# Patient Record
Sex: MALE | Race: WHITE | NOT HISPANIC OR LATINO | Employment: FULL TIME | URBAN - METROPOLITAN AREA
[De-identification: names, ages, dates, MRNs, and addresses within clinical notes are randomized per-mention and may not be internally consistent; named-entity substitution may affect disease eponyms.]

---

## 2018-01-18 ENCOUNTER — RECORDS - HEALTHEAST (OUTPATIENT)
Dept: LAB | Facility: CLINIC | Age: 58
End: 2018-01-18

## 2018-01-19 LAB
BORD PER/PARAPER PCR: NEGATIVE
BORD PER/PARAPER SOURCE: NORMAL

## 2019-12-10 ENCOUNTER — RECORDS - HEALTHEAST (OUTPATIENT)
Dept: LAB | Facility: CLINIC | Age: 59
End: 2019-12-10

## 2019-12-10 LAB
CHOLEST SERPL-MCNC: 167 MG/DL
FASTING STATUS PATIENT QL REPORTED: YES
HDLC SERPL-MCNC: 49 MG/DL
LDLC SERPL CALC-MCNC: 105 MG/DL
PSA SERPL-MCNC: 1.6 NG/ML (ref 0–3.5)
TRIGL SERPL-MCNC: 67 MG/DL

## 2021-08-15 ENCOUNTER — HEALTH MAINTENANCE LETTER (OUTPATIENT)
Age: 61
End: 2021-08-15

## 2021-10-11 ENCOUNTER — HEALTH MAINTENANCE LETTER (OUTPATIENT)
Age: 61
End: 2021-10-11

## 2022-09-25 ENCOUNTER — HEALTH MAINTENANCE LETTER (OUTPATIENT)
Age: 62
End: 2022-09-25

## 2022-12-05 ENCOUNTER — TRANSFERRED RECORDS (OUTPATIENT)
Dept: HEALTH INFORMATION MANAGEMENT | Facility: CLINIC | Age: 62
End: 2022-12-05

## 2023-03-21 ENCOUNTER — OFFICE VISIT (OUTPATIENT)
Dept: CARDIOLOGY | Facility: CLINIC | Age: 63
End: 2023-03-21
Payer: COMMERCIAL

## 2023-03-21 VITALS
RESPIRATION RATE: 16 BRPM | SYSTOLIC BLOOD PRESSURE: 134 MMHG | WEIGHT: 200.6 LBS | DIASTOLIC BLOOD PRESSURE: 72 MMHG | HEIGHT: 70 IN | HEART RATE: 72 BPM | BODY MASS INDEX: 28.72 KG/M2

## 2023-03-21 DIAGNOSIS — I25.10 CORONARY ARTERY CALCIFICATION: ICD-10-CM

## 2023-03-21 DIAGNOSIS — R01.1 SYSTOLIC MURMUR AT CARDIAC APEX: Primary | ICD-10-CM

## 2023-03-21 DIAGNOSIS — G47.33 OBSTRUCTIVE SLEEP APNEA SYNDROME: ICD-10-CM

## 2023-03-21 PROCEDURE — 99204 OFFICE O/P NEW MOD 45 MIN: CPT | Performed by: INTERNAL MEDICINE

## 2023-03-21 RX ORDER — RIZATRIPTAN BENZOATE 10 MG/1
TABLET ORAL
COMMUNITY
Start: 2022-12-05

## 2023-03-21 RX ORDER — ALBUTEROL SULFATE 90 UG/1
1 AEROSOL, METERED RESPIRATORY (INHALATION) PRN
COMMUNITY
Start: 2022-07-31

## 2023-03-21 RX ORDER — LORATADINE 10 MG/1
TABLET ORAL
COMMUNITY

## 2023-03-21 RX ORDER — BENZALKONIUM CHLORIDE 1.3 MG/ML
CLOTH TOPICAL
COMMUNITY

## 2023-03-21 RX ORDER — MULTIVITAMIN,THERAPEUTIC
1 TABLET ORAL DAILY
COMMUNITY

## 2023-03-21 RX ORDER — EPINEPHRINE 0.3 MG/.3ML
0.3 INJECTION SUBCUTANEOUS PRN
COMMUNITY

## 2023-03-21 RX ORDER — CHLORAL HYDRATE 500 MG
2000 CAPSULE ORAL DAILY
COMMUNITY

## 2023-03-21 NOTE — PROGRESS NOTES
[facility]  [phone]        Assessment/Recommendations   Patient with recently discovered heart murmur which is at least 3/6 systolic and heard best at the apex radiating slightly toward the left axilla.  I suspect this is significant mitral insufficiency but there is also some radiation up toward the carotids and I cannot exclude aortic valve issues.  The murmur is significant and an echocardiogram is advised to establish any valvular heart disease.  We will get that in the near future.    He does not have any exercise intolerance with the exception of maybe a little more shortness of breath with physical activity but he admits to being more sedentary and is starting to get more active after his hip surgery which was done in January.  He would like to start exercising.  I advised him to walk briskly but avoid more vigorous exercise until the echocardiogram is performed.  We will call him with the results.    He does have coronary calcifications and we may need and want to do stress testing and be more aggressive with his lipid panel although his LDL cholesterol is quite reasonable at 113 1 month ago.    May want to screen his abdominal aorta in the future as well.    We will get back to him with results of the echocardiogram and further recommendations.       History of Present Illness/Subjective    Mr. Guille Owusu is a 63 year old male with no known heart disease.  He does have a family history where his father  of abdominal aortic aneurysm and had quite significant hypertension for many years.  Mother has hypertension.    The patient has had more medical visits of late and in December had a physical exam without a description of a heart murmur.  On a routine follow-up exam at the end of January he was noted to have a heart murmur.  He had never been told he had a heart murmur before that.  He has no history rheumatic fever.  He has reasonable exercise tolerance but admits to  "not being very active in part because of hip problems and recent hip surgery.  He would like to start an exercise routine.  He does have obstructive sleep apnea and needed to get a different mask which she does not like very much so now only treats himself about 3 hours per night.  He denies orthopnea, paroxysmal nocturnal dyspnea, peripheral edema, and palpitations.  No description of chest discomfort.    He has not had syncopal or near syncopal episodes.    Patient is  and his spouse is with him at the time of our evaluation.  He has 2 children.  He continues to work in the [employer] business and is contemplating [employer] within 1 year.    He has no history of cerebrovascular accident or TIA.  His LDL cholesterol is 113, he does not smoke cigarettes, is not treated for diabetes but does have a family history of hypertension and abdominal aortic aneurysm.    ECG: Personally reviewed.  EKG from his clinic is normal.       Physical Examination Review of Systems   /72 (BP Location: Right arm, Patient Position: Sitting, Cuff Size: Adult Regular)   Pulse 72   Resp 16   Ht 1.778 m (5' 10\")   Wt 91 kg (200 lb 9.6 oz)   BMI 28.78 kg/m    Body mass index is 28.78 kg/m .  Wt Readings from Last 3 Encounters:   03/21/23 91 kg (200 lb 9.6 oz)     General Appearance:   Alert, cooperative and in no acute distress.   ENT/Mouth: Patient wearing a mask.      EYES:  no scleral icterus, normal conjunctivae   Neck: JVP normal. No Hepatojugular reflux. Thyroid not visualized.   Chest/Lungs:   Lungs are clear to auscultation, equal chest wall expansion.   Cardiovascular:   S1, S2 with 3/6 systolic murmur heard best at the apex and radiates toward the left axilla with some radiation up toward the aortic area and into the right carotid.,clicks or rubs. Brachial, radial and posterior tibial pulses are intact and symetric.  Carotid pulses are easily palpable and symmetric.   Abdomen:  Nontender. BS+.  " "  Extremities: No cyanosis, clubbing or edema   Skin: no xanthelasma, warm.    Neurologic: normal arm movement bilateral, no tremors     Psychiatric: Appropriate affect.      Enc Vitals  BP: 134/72  Pulse: 72  Resp: 16  Weight: 91 kg (200 lb 9.6 oz)  Height: 177.8 cm (5' 10\")                                           Medical History  Surgical History Family History Social History   No past medical history on file. No past surgical history on file. No family history on file. Social History     Socioeconomic History     Marital status:      Spouse name: Not on file     Number of children: Not on file     Years of education: Not on file     Highest education level: Not on file   Occupational History     Not on file   Tobacco Use     Smoking status: Never     Smokeless tobacco: Never   Vaping Use     Vaping Use: Never used   Substance and Sexual Activity     Alcohol use: Not on file     Drug use: Not on file     Sexual activity: Not on file   Other Topics Concern     Not on file   Social History Narrative     Not on file     Social Determinants of Health     Financial Resource Strain: Not on file   Food Insecurity: Not on file   Transportation Needs: Not on file   Physical Activity: Not on file   Stress: Not on file   Social Connections: Not on file   Intimate Partner Violence: Not on file   Housing Stability: Not on file          Medications  Allergies   Current Outpatient Medications   Medication Sig Dispense Refill     albuterol (PROAIR HFA/PROVENTIL HFA/VENTOLIN HFA) 108 (90 Base) MCG/ACT inhaler Inhale 1 puff into the lungs as needed       EPINEPHrine (ANY BX GENERIC EQUIV) 0.3 MG/0.3ML injection 2-pack 0.3 mg intramuscularly 1X as directed, 2 of the 2 pack       fish oil-omega-3 fatty acids 1000 MG capsule Take 2,000 mg by mouth daily       loratadine (CLARITIN) 10 MG tablet 1 tab(s) orally once a day       multivitamin, therapeutic (THERA-VIT) TABS tablet Take 1 tablet by mouth daily       Respiratory " Therapy Supplies (CARETOUCH CPAP & BIPAP HOSE) MISC (CUSTOM Rx) C-PAP MACHINE       rizatriptan (MAXALT) 10 MG tablet 1 tab(s) orally may repeat once in 24 hr, no sooner than 2 hr after the first for migraine, 3 month supply      Allergies   Allergen Reactions     Other Food Allergy Anaphylaxis     Fruits, cherries, apples, banana - almost everything except for melons and citrus     Horse-Derived Products Other (See Comments)     Other reaction(s): facial swelling  Facial swelling           Lab Results    Chemistry/lipid CBC Cardiac Enzymes/BNP/TSH/INR   Lab Results   Component Value Date    CHOL 167 12/10/2019    HDL 49 12/10/2019    TRIG 67 12/10/2019    No results found for: WBC, HGB, HCT, MCV, PLT No results found for: CKTOTAL, CKMB, TROPONINI, BNP, TSH, INR

## 2023-03-21 NOTE — LETTER
3/21/2023    RADHA BENDER  [facility] [address]  [facility] [place] [facility] [address]    RE: Guille Owusu       Dear Colleague,     I had the pleasure of seeing Guille Owusu in the [facility].      [facility]  [phone]        Assessment/Recommendations   Patient with recently discovered heart murmur which is at least 3/6 systolic and heard best at the apex radiating slightly toward the left axilla.  I suspect this is significant mitral insufficiency but there is also some radiation up toward the carotids and I cannot exclude aortic valve issues.  The murmur is significant and an echocardiogram is advised to establish any valvular heart disease.  We will get that in the near future.    He does not have any exercise intolerance with the exception of maybe a little more shortness of breath with physical activity but he admits to being more sedentary and is starting to get more active after his hip surgery which was done in January.  He would like to start exercising.  I advised him to walk briskly but avoid more vigorous exercise until the echocardiogram is performed.  We will call him with the results.    He does have coronary calcifications and we may need and want to do stress testing and be more aggressive with his lipid panel although his LDL cholesterol is quite reasonable at 113 1 month ago.    May want to screen his abdominal aorta in the future as well.    We will get back to him with results of the echocardiogram and further recommendations.       History of Present Illness/Subjective    Mr. Guille Owusu is a 63 year old male with no known heart disease.  He does have a family history where his father  of abdominal aortic aneurysm and had quite significant hypertension for many years.  Mother has hypertension.    The patient has had more medical visits of late and in December had a physical exam without a description of a heart murmur.  On a routine  "follow-up exam at the end of January he was noted to have a heart murmur.  He had never been told he had a heart murmur before that.  He has no history rheumatic fever.  He has reasonable exercise tolerance but admits to not being very active in part because of hip problems and recent hip surgery.  He would like to start an exercise routine.  He does have obstructive sleep apnea and needed to get a different mask which she does not like very much so now only treats himself about 3 hours per night.  He denies orthopnea, paroxysmal nocturnal dyspnea, peripheral edema, and palpitations.  No description of chest discomfort.    He has not had syncopal or near syncopal episodes.    Patient is  and his spouse is with him at the time of our evaluation.  He has 2 children.  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    He has no history of cerebrovascular accident or TIA.  His LDL cholesterol is 113, he does not smoke cigarettes, is not treated for diabetes but does have a family history of hypertension and abdominal aortic aneurysm.    ECG: Personally reviewed.  EKG from his clinic is normal.       Physical Examination Review of Systems   /72 (BP Location: Right arm, Patient Position: Sitting, Cuff Size: Adult Regular)   Pulse 72   Resp 16   Ht 1.778 m (5' 10\")   Wt 91 kg (200 lb 9.6 oz)   BMI 28.78 kg/m    Body mass index is 28.78 kg/m .  Wt Readings from Last 3 Encounters:   03/21/23 91 kg (200 lb 9.6 oz)     General Appearance:   Alert, cooperative and in no acute distress.   ENT/Mouth: Patient wearing a mask.      EYES:  no scleral icterus, normal conjunctivae   Neck: JVP normal. No Hepatojugular reflux. Thyroid not visualized.   Chest/Lungs:   Lungs are clear to auscultation, equal chest wall expansion.   Cardiovascular:   S1, S2 with 3/6 systolic murmur heard best at the apex and radiates toward the left axilla with some radiation up " "toward the aortic area and into the right carotid.,clicks or rubs. Brachial, radial and posterior tibial pulses are intact and symetric.  Carotid pulses are easily palpable and symmetric.   Abdomen:  Nontender. BS+.    Extremities: No cyanosis, clubbing or edema   Skin: no xanthelasma, warm.    Neurologic: normal arm movement bilateral, no tremors     Psychiatric: Appropriate affect.      Enc Vitals  BP: 134/72  Pulse: 72  Resp: 16  Weight: 91 kg (200 lb 9.6 oz)  Height: 177.8 cm (5' 10\")                                           Medical History  Surgical History Family History Social History   No past medical history on file. No past surgical history on file. No family history on file. Social History     Socioeconomic History     Marital status:      Spouse name: Not on file     Number of children: Not on file     Years of education: Not on file     Highest education level: Not on file   Occupational History     Not on file   Tobacco Use     Smoking status: Never     Smokeless tobacco: Never   Vaping Use     Vaping Use: Never used   Substance and Sexual Activity     Alcohol use: Not on file     Drug use: Not on file     Sexual activity: Not on file   Other Topics Concern     Not on file   Social History Narrative     Not on file     Social Determinants of Health     Financial Resource Strain: Not on file   Food Insecurity: Not on file   Transportation Needs: Not on file   Physical Activity: Not on file   Stress: Not on file   Social Connections: Not on file   Intimate Partner Violence: Not on file   Housing Stability: Not on file          Medications  Allergies   Current Outpatient Medications   Medication Sig Dispense Refill     albuterol (PROAIR HFA/PROVENTIL HFA/VENTOLIN HFA) 108 (90 Base) MCG/ACT inhaler Inhale 1 puff into the lungs as needed       EPINEPHrine (ANY BX GENERIC EQUIV) 0.3 MG/0.3ML injection 2-pack 0.3 mg intramuscularly 1X as directed, 2 of the 2 pack       fish oil-omega-3 fatty acids " 1000 MG capsule Take 2,000 mg by mouth daily       loratadine (CLARITIN) 10 MG tablet 1 tab(s) orally once a day       multivitamin, therapeutic (THERA-VIT) TABS tablet Take 1 tablet by mouth daily       Respiratory Therapy Supplies (CARETOUCH CPAP & BIPAP HOSE) MISC (CUSTOM Rx) C-PAP MACHINE       rizatriptan (MAXALT) 10 MG tablet 1 tab(s) orally may repeat once in 24 hr, no sooner than 2 hr after the first for migraine, 3 month supply      Allergies   Allergen Reactions     Other Food Allergy Anaphylaxis     Fruits, cherries, apples, banana - almost everything except for melons and citrus     Horse-Derived Products Other (See Comments)     Other reaction(s): facial swelling  Facial swelling           Lab Results    Chemistry/lipid CBC Cardiac Enzymes/BNP/TSH/INR   Lab Results   Component Value Date    CHOL 167 12/10/2019    HDL 49 12/10/2019    TRIG 67 12/10/2019    No results found for: WBC, HGB, HCT, MCV, PLT No results found for: CKTOTAL, CKMB, TROPONINI, BNP, TSH, INR             Thank you for allowing me to participate in the care of your patient.      Sincerely,     Armando Ram MD     [facility]  cc:   No referring provider defined for this encounter.

## 2023-03-23 ENCOUNTER — HOSPITAL ENCOUNTER (OUTPATIENT)
Dept: CARDIOLOGY | Facility: CLINIC | Age: 63
Discharge: HOME | End: 2023-03-23
Attending: INTERNAL MEDICINE | Admitting: INTERNAL MEDICINE
Payer: COMMERCIAL

## 2023-03-23 DIAGNOSIS — R01.1 SYSTOLIC MURMUR AT CARDIAC APEX: ICD-10-CM

## 2023-03-23 LAB — LVEF ECHO: NORMAL

## 2023-03-23 PROCEDURE — 93306 TTE W/DOPPLER COMPLETE: CPT | Mod: 26 | Performed by: INTERNAL MEDICINE

## 2023-03-23 PROCEDURE — 93306 TTE W/DOPPLER COMPLETE: CPT

## 2023-03-28 DIAGNOSIS — R01.1 SYSTOLIC MURMUR AT CARDIAC APEX: Primary | ICD-10-CM

## 2023-03-30 ENCOUNTER — HOSPITAL ENCOUNTER (OUTPATIENT)
Dept: CARDIOLOGY | Facility: HOSPITAL | Age: 63
Discharge: HOME | End: 2023-03-30
Attending: INTERNAL MEDICINE | Admitting: INTERNAL MEDICINE
Payer: COMMERCIAL

## 2023-03-30 VITALS
WEIGHT: 194 LBS | RESPIRATION RATE: 18 BRPM | DIASTOLIC BLOOD PRESSURE: 70 MMHG | TEMPERATURE: 97.9 F | SYSTOLIC BLOOD PRESSURE: 113 MMHG | HEIGHT: 70 IN | HEART RATE: 59 BPM | OXYGEN SATURATION: 96 % | BODY MASS INDEX: 27.77 KG/M2

## 2023-03-30 DIAGNOSIS — R01.1 SYSTOLIC MURMUR AT CARDIAC APEX: ICD-10-CM

## 2023-03-30 LAB — LVEF ECHO: NORMAL

## 2023-03-30 PROCEDURE — 93325 DOPPLER ECHO COLOR FLOW MAPG: CPT | Mod: 26 | Performed by: INTERNAL MEDICINE

## 2023-03-30 PROCEDURE — 258N000003 HC RX IP 258 OP 636: Performed by: INTERNAL MEDICINE

## 2023-03-30 PROCEDURE — 93325 DOPPLER ECHO COLOR FLOW MAPG: CPT

## 2023-03-30 PROCEDURE — 250N000009 HC RX 250: Performed by: INTERNAL MEDICINE

## 2023-03-30 PROCEDURE — 250N000011 HC RX IP 250 OP 636: Performed by: INTERNAL MEDICINE

## 2023-03-30 PROCEDURE — 93320 DOPPLER ECHO COMPLETE: CPT | Mod: 26 | Performed by: INTERNAL MEDICINE

## 2023-03-30 PROCEDURE — 99152 MOD SED SAME PHYS/QHP 5/>YRS: CPT | Performed by: INTERNAL MEDICINE

## 2023-03-30 PROCEDURE — 93312 ECHO TRANSESOPHAGEAL: CPT

## 2023-03-30 PROCEDURE — 93312 ECHO TRANSESOPHAGEAL: CPT | Mod: 26 | Performed by: INTERNAL MEDICINE

## 2023-03-30 PROCEDURE — 99153 MOD SED SAME PHYS/QHP EA: CPT | Performed by: INTERNAL MEDICINE

## 2023-03-30 RX ORDER — IBUPROFEN 200 MG
200-400 TABLET ORAL EVERY 6 HOURS PRN
COMMUNITY

## 2023-03-30 RX ORDER — AMOXICILLIN 500 MG/1
CAPSULE ORAL
COMMUNITY
Start: 2023-02-13 | End: 2023-04-25

## 2023-03-30 RX ORDER — SODIUM CHLORIDE 9 MG/ML
INJECTION, SOLUTION INTRAVENOUS CONTINUOUS
Status: DISCONTINUED | OUTPATIENT
Start: 2023-03-30 | End: 2023-03-30 | Stop reason: HOSPADM

## 2023-03-30 RX ORDER — LIDOCAINE HYDROCHLORIDE 20 MG/ML
SOLUTION OROPHARYNGEAL
Status: COMPLETED | OUTPATIENT
Start: 2023-03-30 | End: 2023-03-30

## 2023-03-30 RX ORDER — FLUTICASONE PROPIONATE 50 MCG
2 SPRAY, SUSPENSION (ML) NASAL
COMMUNITY
Start: 2023-03-28 | End: 2023-04-25

## 2023-03-30 RX ORDER — FENTANYL CITRATE 50 UG/ML
INJECTION, SOLUTION INTRAMUSCULAR; INTRAVENOUS
Status: COMPLETED | OUTPATIENT
Start: 2023-03-30 | End: 2023-03-30

## 2023-03-30 RX ADMIN — MIDAZOLAM 0.5 MG: 1 INJECTION INTRAMUSCULAR; INTRAVENOUS at 13:13

## 2023-03-30 RX ADMIN — BENZOCAINE 2 SPRAY: 220 SPRAY, METERED PERIODONTAL at 12:59

## 2023-03-30 RX ADMIN — SODIUM CHLORIDE: 9 INJECTION, SOLUTION INTRAVENOUS at 12:23

## 2023-03-30 RX ADMIN — LIDOCAINE HYDROCHLORIDE 10 ML: 20 SOLUTION ORAL; TOPICAL at 12:56

## 2023-03-30 RX ADMIN — FENTANYL CITRATE 25 MCG: 50 INJECTION, SOLUTION INTRAMUSCULAR; INTRAVENOUS at 13:02

## 2023-03-30 RX ADMIN — MIDAZOLAM 0.5 MG: 1 INJECTION INTRAMUSCULAR; INTRAVENOUS at 13:23

## 2023-03-30 RX ADMIN — MIDAZOLAM 0.5 MG: 1 INJECTION INTRAMUSCULAR; INTRAVENOUS at 13:16

## 2023-03-30 RX ADMIN — MIDAZOLAM 1 MG: 1 INJECTION INTRAMUSCULAR; INTRAVENOUS at 13:03

## 2023-03-30 RX ADMIN — BENZOCAINE 2 SPRAY: 220 SPRAY, METERED PERIODONTAL at 13:01

## 2023-03-30 RX ADMIN — MIDAZOLAM 1 MG: 1 INJECTION INTRAMUSCULAR; INTRAVENOUS at 13:00

## 2023-03-30 RX ADMIN — FENTANYL CITRATE 75 MCG: 50 INJECTION, SOLUTION INTRAMUSCULAR; INTRAVENOUS at 12:59

## 2023-03-30 RX ADMIN — BENZOCAINE 2 SPRAY: 220 SPRAY, METERED PERIODONTAL at 13:02

## 2023-03-30 RX ADMIN — MIDAZOLAM 0.5 MG: 1 INJECTION INTRAMUSCULAR; INTRAVENOUS at 13:08

## 2023-03-30 ASSESSMENT — ACTIVITIES OF DAILY LIVING (ADL): ADLS_ACUITY_SCORE: 35

## 2023-03-30 NOTE — DISCHARGE INSTRUCTIONS
1. You are required to have someone accompany you home.    2. Rest today. Do not drive or operate machinery today. Over-activity may produce nausea and dizziness.    3. You should follow your normal diet. Drink plenty of fluids. Do not drink any alcoholic beverages for 24 hours. *(Alcohol may interact with the medications you received today).    4. NO HOT FOODS or LIQUIDS FOR 6 HOURS after the procedure.  0800 PM    5. You may have a sore throat or cough. This is normal. These symptoms should resolve in 24 hours.     6. If you have further questions call your doctor:

## 2023-03-30 NOTE — PRE-PROCEDURE
GENERAL PRE-PROCEDURE:   Date/Time:  3/30/2023 12:50 PM    Verbal consent obtained?: Yes    Written consent obtained?: Yes    Risks and benefits: Risks, benefits and alternatives were discussed    Consent given by:  Patient  Patient states understanding of procedure being performed: Yes    Patient's understanding of procedure matches consent: Yes    Procedure consent matches procedure scheduled: Yes    Expected level of sedation:  Moderate  Appropriately NPO:  Yes  Mallampati  :  Grade 2- soft palate, base of uvula, tonsillar pillars, and portion of posterior pharyngeal wall visible  Lungs:  Lungs clear with good breath sounds bilaterally  Heart:  RRR and systolic murmur  History & Physical reviewed:  History and physical reviewed and no updates needed  Statement of review:  I have reviewed the lab findings, diagnostic data, medications, and the plan for sedation

## 2023-04-03 ENCOUNTER — TELEPHONE (OUTPATIENT)
Dept: CARDIOLOGY | Facility: CLINIC | Age: 63
End: 2023-04-03
Payer: COMMERCIAL

## 2023-04-03 NOTE — TELEPHONE ENCOUNTER
----- Message from Samara DOVER MA sent at 4/3/2023  2:19 PM CDT -----    Appt for pt is on 4/18/23 @ 1:30pm at [facility], Spoke with pt regarding appt.    ----- Message -----  From: Aniceto Zeng MD  Sent: 4/3/2023   1:40 PM CDT  To: Erika Barger RN; #    Hi Armando,    Yes, I will definitely see him and discuss the treatment of his MR with him. I reviewed the KARISHMA and it does look repairable.    Thank you,  David  ----- Message -----  From: Armando Ram MD  Sent: 4/1/2023   9:45 AM CDT  To: Erika Barger RN; Rachel Monahan; #    Gaby,    Recently saw Mr. Owusu for new murmur. 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    Could he get in to see Dr. Zeng in the next couple of weeks?    He is also a friend from the past.    Thanks,    Armando

## 2023-04-03 NOTE — TELEPHONE ENCOUNTER
4/3/23 called and spoke with pt regarding his new upcoming 4/18/23 @ 1:30 pm appt with Dr. Zeng. Referral from Dr. Armando Ram to [facility] treatment of MR.  Pt understands and agrees to the plan.

## 2023-04-06 ENCOUNTER — TRANSFERRED RECORDS (OUTPATIENT)
Dept: HEALTH INFORMATION MANAGEMENT | Facility: CLINIC | Age: 63
End: 2023-04-06
Payer: COMMERCIAL

## 2023-04-16 ENCOUNTER — HOSPITAL ENCOUNTER (EMERGENCY)
Facility: HOSPITAL | Age: 63
Discharge: HOME | End: 2023-04-16
Attending: EMERGENCY MEDICINE | Admitting: EMERGENCY MEDICINE
Payer: COMMERCIAL

## 2023-04-16 ENCOUNTER — ANCILLARY PROCEDURE (OUTPATIENT)
Dept: ULTRASOUND IMAGING | Facility: HOSPITAL | Age: 63
End: 2023-04-16
Attending: EMERGENCY MEDICINE
Payer: COMMERCIAL

## 2023-04-16 ENCOUNTER — TELEPHONE (OUTPATIENT)
Dept: CARDIOLOGY | Facility: CLINIC | Age: 63
End: 2023-04-16
Payer: COMMERCIAL

## 2023-04-16 ENCOUNTER — APPOINTMENT (OUTPATIENT)
Dept: RADIOLOGY | Facility: HOSPITAL | Age: 63
End: 2023-04-16
Attending: EMERGENCY MEDICINE
Payer: COMMERCIAL

## 2023-04-16 VITALS
HEART RATE: 56 BPM | SYSTOLIC BLOOD PRESSURE: 109 MMHG | TEMPERATURE: 97.4 F | DIASTOLIC BLOOD PRESSURE: 65 MMHG | OXYGEN SATURATION: 96 % | RESPIRATION RATE: 20 BRPM

## 2023-04-16 DIAGNOSIS — R07.89 CHEST TIGHTNESS: ICD-10-CM

## 2023-04-16 DIAGNOSIS — R06.02 SHORTNESS OF BREATH: ICD-10-CM

## 2023-04-16 DIAGNOSIS — I34.0 MITRAL VALVE INSUFFICIENCY, UNSPECIFIED ETIOLOGY: ICD-10-CM

## 2023-04-16 PROBLEM — G47.33 OSA (OBSTRUCTIVE SLEEP APNEA): Status: ACTIVE | Noted: 2023-01-31

## 2023-04-16 PROBLEM — Z91.018 ALLERGY TO OTHER FOODS: Status: ACTIVE | Noted: 2023-04-16

## 2023-04-16 PROBLEM — G43.909 MIGRAINE: Status: ACTIVE | Noted: 2023-04-16

## 2023-04-16 PROBLEM — F43.29 ADJUSTMENT DISORDER WITH OTHER SYMPTOM: Status: ACTIVE | Noted: 2023-04-16

## 2023-04-16 PROBLEM — J45.990 EXERCISE-INDUCED BRONCHOSPASM: Status: ACTIVE | Noted: 2023-04-16

## 2023-04-16 PROBLEM — R01.1 SYSTOLIC MURMUR: Status: ACTIVE | Noted: 2023-01-31

## 2023-04-16 PROBLEM — J30.9 ALLERGIC RHINITIS: Status: ACTIVE | Noted: 2023-04-16

## 2023-04-16 PROBLEM — T78.04XA: Status: ACTIVE | Noted: 2023-04-16

## 2023-04-16 LAB
ANION GAP SERPL CALCULATED.3IONS-SCNC: 11 MMOL/L (ref 7–15)
BASOPHILS # BLD AUTO: 0.1 10E3/UL (ref 0–0.2)
BASOPHILS NFR BLD AUTO: 1 %
BUN SERPL-MCNC: 17.5 MG/DL (ref 8–23)
CALCIUM SERPL-MCNC: 9.3 MG/DL (ref 8.8–10.2)
CHLORIDE SERPL-SCNC: 101 MMOL/L (ref 98–107)
CREAT SERPL-MCNC: 1.05 MG/DL (ref 0.67–1.17)
DEPRECATED HCO3 PLAS-SCNC: 26 MMOL/L (ref 22–29)
EOSINOPHIL # BLD AUTO: 0.3 10E3/UL (ref 0–0.7)
EOSINOPHIL NFR BLD AUTO: 4 %
ERYTHROCYTE [DISTWIDTH] IN BLOOD BY AUTOMATED COUNT: 13.2 % (ref 10–15)
GFR SERPL CREATININE-BSD FRML MDRD: 80 ML/MIN/1.73M2
GLUCOSE SERPL-MCNC: 110 MG/DL (ref 70–99)
HCT VFR BLD AUTO: 43.9 % (ref 40–53)
HGB BLD-MCNC: 15 G/DL (ref 13.3–17.7)
HOLD SPECIMEN: NORMAL
IMM GRANULOCYTES # BLD: 0 10E3/UL
IMM GRANULOCYTES NFR BLD: 0 %
LYMPHOCYTES # BLD AUTO: 2.8 10E3/UL (ref 0.8–5.3)
LYMPHOCYTES NFR BLD AUTO: 42 %
MAGNESIUM SERPL-MCNC: 1.9 MG/DL (ref 1.7–2.3)
MCH RBC QN AUTO: 31.7 PG (ref 26.5–33)
MCHC RBC AUTO-ENTMCNC: 34.2 G/DL (ref 31.5–36.5)
MCV RBC AUTO: 93 FL (ref 78–100)
MONOCYTES # BLD AUTO: 0.7 10E3/UL (ref 0–1.3)
MONOCYTES NFR BLD AUTO: 11 %
NEUTROPHILS # BLD AUTO: 2.7 10E3/UL (ref 1.6–8.3)
NEUTROPHILS NFR BLD AUTO: 42 %
NRBC # BLD AUTO: 0 10E3/UL
NRBC BLD AUTO-RTO: 0 /100
NT-PROBNP SERPL-MCNC: 143 PG/ML (ref 0–900)
PLATELET # BLD AUTO: 235 10E3/UL (ref 150–450)
POTASSIUM SERPL-SCNC: 4 MMOL/L (ref 3.4–5.3)
RBC # BLD AUTO: 4.73 10E6/UL (ref 4.4–5.9)
SODIUM SERPL-SCNC: 138 MMOL/L (ref 136–145)
TROPONIN T SERPL HS-MCNC: 9 NG/L
WBC # BLD AUTO: 6.5 10E3/UL (ref 4–11)

## 2023-04-16 PROCEDURE — 36415 COLL VENOUS BLD VENIPUNCTURE: CPT | Performed by: STUDENT IN AN ORGANIZED HEALTH CARE EDUCATION/TRAINING PROGRAM

## 2023-04-16 PROCEDURE — 83880 ASSAY OF NATRIURETIC PEPTIDE: CPT | Performed by: EMERGENCY MEDICINE

## 2023-04-16 PROCEDURE — 93308 TTE F-UP OR LMTD: CPT

## 2023-04-16 PROCEDURE — 250N000013 HC RX MED GY IP 250 OP 250 PS 637: Performed by: EMERGENCY MEDICINE

## 2023-04-16 PROCEDURE — 83735 ASSAY OF MAGNESIUM: CPT | Performed by: EMERGENCY MEDICINE

## 2023-04-16 PROCEDURE — 82310 ASSAY OF CALCIUM: CPT | Performed by: EMERGENCY MEDICINE

## 2023-04-16 PROCEDURE — 93005 ELECTROCARDIOGRAM TRACING: CPT | Performed by: STUDENT IN AN ORGANIZED HEALTH CARE EDUCATION/TRAINING PROGRAM

## 2023-04-16 PROCEDURE — 99285 EMERGENCY DEPT VISIT HI MDM: CPT | Mod: 25

## 2023-04-16 PROCEDURE — 85025 COMPLETE CBC W/AUTO DIFF WBC: CPT | Performed by: EMERGENCY MEDICINE

## 2023-04-16 PROCEDURE — 71046 X-RAY EXAM CHEST 2 VIEWS: CPT

## 2023-04-16 PROCEDURE — 84484 ASSAY OF TROPONIN QUANT: CPT | Performed by: EMERGENCY MEDICINE

## 2023-04-16 RX ORDER — NITROGLYCERIN 0.4 MG/1
0.4 TABLET SUBLINGUAL EVERY 5 MIN PRN
Status: DISCONTINUED | OUTPATIENT
Start: 2023-04-16 | End: 2023-04-16 | Stop reason: HOSPADM

## 2023-04-16 RX ADMIN — NITROGLYCERIN 0.4 MG: 0.4 TABLET, ORALLY DISINTEGRATING SUBLINGUAL at 18:35

## 2023-04-16 RX ADMIN — NITROGLYCERIN 0.4 MG: 0.4 TABLET, ORALLY DISINTEGRATING SUBLINGUAL at 18:41

## 2023-04-16 ASSESSMENT — ACTIVITIES OF DAILY LIVING (ADL)
ADLS_ACUITY_SCORE: 35
ADLS_ACUITY_SCORE: 35

## 2023-04-16 NOTE — ED TRIAGE NOTES
Pt having chest tightness since yesterday.  Pt has mitral valve leakage. Pt is to see MD on Tuesday for surgery eval.  Pt having increased sob with exertion     Triage Assessment     Row Name 04/16/23 [id number]       Triage Assessment (Adult)    Airway WDL WDL       Respiratory WDL    Respiratory WDL X       Skin Circulation/Temperature WDL    Skin Circulation/Temperature WDL WDL       Cardiac WDL    Cardiac WDL X;chest pain       Peripheral/Neurovascular WDL    Peripheral Neurovascular WDL WDL       Cognitive/Neuro/Behavioral WDL    Cognitive/Neuro/Behavioral WDL WDL

## 2023-04-16 NOTE — H&P (VIEW-ONLY)
EMERGENCY DEPARTMENT ENCOUNTER      NAME: Guille Owusu  AGE: 63 year old male  YOB: 1960  MRN: [id number]  EVALUATION DATE & TIME: 4/16/2023  4:53 PM    PCP: Tony Horne    ED PROVIDER: Tamra Barber M.D.      Chief Complaint   Patient presents with     Chest Pain     FINAL IMPRESSION:  1. Chest tightness    2. Shortness of breath    3. Mitral valve insufficiency, unspecified etiology        ED COURSE & MEDICAL DECISION MAKING:    Pertinent Labs & Imaging studies reviewed. (See chart for details)  ED Course as of 04/16/23 2031   Sun Apr 16, 2023   1712 Patient is a pleasant 63-year-old male who comes in today for evaluation of some chest tightness and some shortness of breath.  He has noticed increasing shortness of breath with exertion.  He is scheduled to see Dr. Zeng on Tuesday for surgical evaluation for severe mitral valve prolapse.  He has a friend of Dr. Ram and texted him today telling him that he was having some worsening symptoms and he recommended that he come in and get evaluated.  Patient reports that last night when he laid down he felt some shortness of breath.  It sounds like some orthopnea.  He already uses a CPAP at night.  I did a quick bedside ultrasound I do not see any pericardial effusion.  We are going to get lab work including a troponin and a BNP.  We will check his electrolytes.  We will check his hemoglobin level.  We will get a chest x-ray as well to further evaluate.  I discussed all of this with the patient and he is in agreement.   1829 Patient is having continued chest pressure some can order a little nitro for him.   1900 Back and saw the patient.  He is feeling good.  His blood pressure dropped after the nitroglycerin but he says he actually felt pretty good even before he got the nitroglycerin.  He has no pressure right now.  He is laying flat without any difficulty right now.  He says he feels back to how he felt a week ago.  I talked to him  about potential admission versus discharge.  He really feels like he could go home and follow-up on Tuesday with Dr. Zeng.  I Cece run it by cardiology.  I think that is probably reasonable 1 to make sure that they are okay with that as well.   1908 I spoke with Dr. Goyal with cardiology.  He thought it was reasonable to the patient go home since he is not having symptoms now and his work-up was unremarkable.  Patient will follow-up on Tuesday with Dr. Zeng.       4:57 PM I met with the patient to gather history and to perform my initial exam. I discussed the plan for care while in the Emergency Department.   6:56 PM Checked in on and updated patient.   7:05 PM Spoke with cardiology, Dr. Goyal.   7:09 PM I discussed the plan for discharge with the patient, and patient is agreeable.  We discussed supportive cares at home and reasons for return to the ER including new or worsening symptoms - all questions and concerns addressed.  Patient to be discharged by RN.     Medical Decision Making    History:    Supplemental history from: None    External Record(s) reviewed: I reviewed the echocardiogram from 3/30/2023 which showed an EF of 60 to 65% with 4+ severe mitral regurgitation.  There was no thrombus in the left atrial appendage and the left atrium was mild to moderately dilated.    Work Up:    Emergent/Severe conditions considered and evaluated for: ACS, arrhythmia, severe electrolyte abnormality, renal failure, pneumothorax, pulmonary edema, heart failure    I independently reviewed and interpreted EKG and chest x-ray which showed no pneumothorax or significant pulmonary edema.  See full radiology report for all details    In additional to work up documented, I considered the following work up: None    Medications given that require intensive monitoring for toxicity: None    External consultation:    Discussion of management with another provider: Cardiology     Complicating factors:    Care impacted by  chronic illness: Mitral valve prolapse        Care affected by social determinants of health: Access to care on the weekend    Disposition considerations: Consider admission but after blood work and imaging came back and patient had improvement in his symptoms and I discussed with cardiology we opted to discharge the patient home with close follow-up.  He was in agreement  Prescriptions considered/prescribed: None    At the conclusion of the encounter I discussed  the results of all of the tests and the disposition with patient.   All questions were answered.  The patient acknowledged understanding and was involved in the decision making regarding the overall care plan.      I discussed with patient the utility, limitations and findings of the exam/interventions/studies done during this visit as well as the list of differential diagnosis and symptoms to monitor/return to ER for.  Additional verbal discharge instructions were provided.     MEDICATIONS GIVEN IN THE EMERGENCY:  Medications   nitroGLYcerin (NITROSTAT) sublingual tablet 0.4 mg (0.4 mg Sublingual $Given 4/16/23 1841)       NEW PRESCRIPTIONS STARTED AT TODAY'S ER VISIT  Discharge Medication List as of 4/16/2023  7:15 PM             =================================================================    HPI    Triage Note:      Patient information was obtained from: Patient    Use of : N/A         Guille Owusu is a 63 year old male who presents for chest tightness.     Per chart review: On 3/21/2023 at [facility] with Dr. Elio Ram. Patient was recently diagnosed with a heart murmur which is at least 3/6 systolic and heard best at the apex radiating slightly towards the left axilla. EKG from his clinic is normal. Plan for echocardiogram.  Echocardiogram: Left ventricular size, wall motion and function are normal. The ejection fraction is 60-65%. Normal right ventricle size and systolic function. The left atrium is moderately dilated.  "There is prolapse of the posterior mitral leaflet. There is severe (4+) mitral regurgitation. The mitral regurgitant jet is anteriorly directed, which is consistent with posterior leaflet pathology.    Patient endorses seeing his PCP 2 1/2 weeks ago and was told he had a heart murmur. He went to see Dr. Elio Ram who told him he has a severe mitral valve prolapse. He was told to limit exertion to walking only. Since that time he has been feeling fine until he had a sudden onset of shortness of breath and chest tightness that he describes as feeling \"not right\" starting yesterday. He reports going to bed and waking up more short of breath even though he was laying down which was new for him. He went back to sleep, and earlier today he texted Dr. Ram who told him to present to this ED. Patient states currently he is feeling anxious.     Patient endorses wearing a cpap for sleep apnea, and states he was using it last night.   Patient states he has an appointment on Tuesday with Dr. Ram for surgery evaluation. Patient denies leg swelling, or any other complaints at this time.      PAST MEDICAL HISTORY:  History reviewed. No pertinent past medical history.    PAST SURGICAL HISTORY:  History reviewed. No pertinent surgical history.    CURRENT MEDICATIONS:      Current Facility-Administered Medications:      nitroGLYcerin (NITROSTAT) sublingual tablet 0.4 mg, 0.4 mg, Sublingual, Q5 Min PRN, Tamra Barber MD, 0.4 mg at 04/16/23 0080    Current Outpatient Medications:      albuterol (PROAIR HFA/PROVENTIL HFA/VENTOLIN HFA) 108 (90 Base) MCG/ACT inhaler, Inhale 1 puff into the lungs as needed, Disp: , Rfl:      amoxicillin (AMOXIL) 500 MG capsule, TAKE 4 CAPSULES BY MOUTH 1 HOUR PRIOR TO DENTAL APPOINTMENT, Disp: , Rfl:      EPINEPHrine (ANY BX GENERIC EQUIV) 0.3 MG/0.3ML injection 2-pack, 0.3 mg intramuscularly 1X as directed, 2 of the 2 pack, Disp: , Rfl:      fish oil-omega-3 fatty acids 1000 MG " capsule, Take 2,000 mg by mouth daily, Disp: , Rfl:      fluticasone (FLONASE) 50 MCG/ACT nasal spray, 2 sprays, Disp: , Rfl:      ibuprofen (ADVIL/MOTRIN) 200 MG tablet, Take 200-400 mg by mouth, Disp: , Rfl:      loratadine (CLARITIN) 10 MG tablet, 1 tab(s) orally once a day, Disp: , Rfl:      multivitamin, therapeutic (THERA-VIT) TABS tablet, Take 1 tablet by mouth daily, Disp: , Rfl:      Respiratory Therapy Supplies (CARETOUCH CPAP & BIPAP HOSE) MISC, (CUSTOM Rx) C-PAP MACHINE, Disp: , Rfl:      rizatriptan (MAXALT) 10 MG tablet, 1 tab(s) orally may repeat once in 24 hr, no sooner than 2 hr after the first for migraine, 3 month supply, Disp: , Rfl:     ALLERGIES:  Allergies   Allergen Reactions     Other Food Allergy Anaphylaxis     Fruits, cherries, apples, banana - almost everything except for melons and citrus     Horse-Derived Products Other (See Comments)     Other reaction(s): facial swelling  Facial swelling         FAMILY HISTORY:  History reviewed. No pertinent family history.    SOCIAL HISTORY:   Social History     Socioeconomic History     Marital status:    Tobacco Use     Smoking status: Never     Smokeless tobacco: Never   Vaping Use     Vaping status: Never Used       PHYSICAL EXAM    VITAL SIGNS: /65   Pulse 56   Temp 97.4  F (36.3  C)   Resp 20   SpO2 96%    GENERAL: Awake, alert, answering questions appropriately, appears in no distress   SPEECH:  Easy to understand speech, Normal volume and rossy  PULMONARY: No respiratory distress, Lungs clear to auscultation bilaterally  CARDIOVASCULAR: Regular rate and rhythm, Distal pulses present and normal.  ABDOMINAL: Soft, Nondistended, Nontender, No rebound or guarding, No palpable masses  EXTREMITIES: No lower extremity edema.  PSYCH: Normal mood and affect     LAB:  All pertinent labs reviewed and interpreted.  Results for orders placed or performed during the hospital encounter of 04/16/23   POC US ECHO LIMITED    Impression     PROCEDURE:    Emergency Department Limited Bedside Screening Cardiac Ultrasound  INDICATIONS: shortness of breath  PROCEDURE PROVIDER: Tamra Barber   WINDOW AND FINDINGS:   SUB-XYPHOID     :      Grossly normal/symmetric wall motion  Pericardial effusion: No clinically significant pericardial effusion    IMAGES SAVED AND STORED FOR ARCHIVE AND REVIEW: Yes        Chest XR,  PA & LAT    Impression    IMPRESSION: Cardiomediastinal silhouette is normal. Normal vasculature. Lungs and pleural spaces are clear.   Extra Blue Top Tube   Result Value Ref Range    Hold Specimen JIC    Extra Red Top Tube   Result Value Ref Range    Hold Specimen JIC    Extra Green Top (Lithium Heparin) Tube   Result Value Ref Range    Hold Specimen JIC    Extra Purple Top Tube   Result Value Ref Range    Hold Specimen JIC    Basic metabolic panel   Result Value Ref Range    Sodium 138 136 - 145 mmol/L    Potassium 4.0 3.4 - 5.3 mmol/L    Chloride 101 98 - 107 mmol/L    Carbon Dioxide (CO2) 26 22 - 29 mmol/L    Anion Gap 11 7 - 15 mmol/L    Urea Nitrogen 17.5 8.0 - 23.0 mg/dL    Creatinine 1.05 0.67 - 1.17 mg/dL    Calcium 9.3 8.8 - 10.2 mg/dL    Glucose 110 (H) 70 - 99 mg/dL    GFR Estimate 80 >60 mL/min/1.73m2   Result Value Ref Range    Troponin T, High Sensitivity 9 <=22 ng/L   Nt probnp inpatient (BNP)   Result Value Ref Range    N terminal Pro BNP Inpatient 143 0 - 900 pg/mL   Result Value Ref Range    Magnesium 1.9 1.7 - 2.3 mg/dL   CBC with platelets and differential   Result Value Ref Range    WBC Count 6.5 4.0 - 11.0 10e3/uL    RBC Count 4.73 4.40 - 5.90 10e6/uL    Hemoglobin 15.0 13.3 - 17.7 g/dL    Hematocrit 43.9 40.0 - 53.0 %    MCV 93 78 - 100 fL    MCH 31.7 26.5 - 33.0 pg    MCHC 34.2 31.5 - 36.5 g/dL    RDW 13.2 10.0 - 15.0 %    Platelet Count 235 150 - 450 10e3/uL    % Neutrophils 42 %    % Lymphocytes 42 %    % Monocytes 11 %    % Eosinophils 4 %    % Basophils 1 %    % Immature Granulocytes 0 %    NRBCs per 100 WBC 0 <1  /100    Absolute Neutrophils 2.7 1.6 - 8.3 10e3/uL    Absolute Lymphocytes 2.8 0.8 - 5.3 10e3/uL    Absolute Monocytes 0.7 0.0 - 1.3 10e3/uL    Absolute Eosinophils 0.3 0.0 - 0.7 10e3/uL    Absolute Basophils 0.1 0.0 - 0.2 10e3/uL    Absolute Immature Granulocytes 0.0 <=0.4 10e3/uL    Absolute NRBCs 0.0 10e3/uL       RADIOLOGY:  Chest XR,  PA & LAT   Final Result   IMPRESSION: Cardiomediastinal silhouette is normal. Normal vasculature. Lungs and pleural spaces are clear.      POC US ECHO LIMITED   ED Interpretation   PROCEDURE:    Emergency Department Limited Bedside Screening Cardiac Ultrasound  INDICATIONS: shortness of breath  PROCEDURE PROVIDER: Tamra Barber   WINDOW AND FINDINGS:   SUB-XYPHOID     :      Grossly normal/symmetric wall motion  Pericardial effusion: No clinically significant pericardial effusion    IMAGES SAVED AND STORED FOR ARCHIVE AND REVIEW: Yes                   EKG:    Date and time: April 16, 2023 at 1659  Rate: 73 bpm  Rhythm: Sinus rhythm  DC interval: 140 ms  QRS interval: 82 ms  QT/QTc: 366/403 ms  ST changes or T wave changes: No acute ST or T wave abnormalities  Change from prior ECG: No prior to compare to  I have independently reviewed and interpreted this EKG.     PROCEDURES:     POC US ECHO LIMITED   ED Interpretation   PROCEDURE:    Emergency Department Limited Bedside Screening Cardiac Ultrasound  INDICATIONS: shortness of breath  PROCEDURE PROVIDER: Tamra Barber   WINDOW AND FINDINGS:   SUB-XYPHOID     :      Grossly normal/symmetric wall motion  Pericardial effusion: No clinically significant pericardial effusion    IMAGES SAVED AND STORED FOR ARCHIVE AND REVIEW: Yes            I, Aleta Garcia, am serving as a scribe to document services personally performed by Dr. Barber based on my observation and the provider's statements to me. I, Tamra Barber MD attest that Aleta Garcia is acting in a scribe capacity, has observed my performance of the services and has  documented them in accordance with my direction.    Tamra Barber M.D.  Emergency Medicine  [facility] EMERGENCY DEPARTMENT  [facility] [facility] [id number] [phone]  Dept: [phone]     Tamra Barber MD  04/16/23 2031

## 2023-04-16 NOTE — ED PROVIDER NOTES
EMERGENCY DEPARTMENT ENCOUNTER      NAME: Guille Owusu  AGE: 63 year old male  YOB: 1960  MRN: [id number]  EVALUATION DATE & TIME: 4/16/2023  4:53 PM    PCP: Tony Horne    ED PROVIDER: Tamra Barber M.D.      Chief Complaint   Patient presents with     Chest Pain     FINAL IMPRESSION:  1. Chest tightness    2. Shortness of breath    3. Mitral valve insufficiency, unspecified etiology        ED COURSE & MEDICAL DECISION MAKING:    Pertinent Labs & Imaging studies reviewed. (See chart for details)  ED Course as of 04/16/23 2031   Sun Apr 16, 2023   1712 Patient is a pleasant 63-year-old male who comes in today for evaluation of some chest tightness and some shortness of breath.  He has noticed increasing shortness of breath with exertion.  He is scheduled to see Dr. Zeng on Tuesday for surgical evaluation for severe mitral valve prolapse.  He has a friend of Dr. Ram and texted him today telling him that he was having some worsening symptoms and he recommended that he come in and get evaluated.  Patient reports that last night when he laid down he felt some shortness of breath.  It sounds like some orthopnea.  He already uses a CPAP at night.  I did a quick bedside ultrasound I do not see any pericardial effusion.  We are going to get lab work including a troponin and a BNP.  We will check his electrolytes.  We will check his hemoglobin level.  We will get a chest x-ray as well to further evaluate.  I discussed all of this with the patient and he is in agreement.   1829 Patient is having continued chest pressure some can order a little nitro for him.   1900 Back and saw the patient.  He is feeling good.  His blood pressure dropped after the nitroglycerin but he says he actually felt pretty good even before he got the nitroglycerin.  He has no pressure right now.  He is laying flat without any difficulty right now.  He says he feels back to how he felt a week ago.  I talked to him  about potential admission versus discharge.  He really feels like he could go home and follow-up on Tuesday with Dr. Zeng.  I Cece run it by cardiology.  I think that is probably reasonable 1 to make sure that they are okay with that as well.   1908 I spoke with Dr. Goyal with cardiology.  He thought it was reasonable to the patient go home since he is not having symptoms now and his work-up was unremarkable.  Patient will follow-up on Tuesday with Dr. Zeng.       4:57 PM I met with the patient to gather history and to perform my initial exam. I discussed the plan for care while in the Emergency Department.   6:56 PM Checked in on and updated patient.   7:05 PM Spoke with cardiology, Dr. Goyal.   7:09 PM I discussed the plan for discharge with the patient, and patient is agreeable.  We discussed supportive cares at home and reasons for return to the ER including new or worsening symptoms - all questions and concerns addressed.  Patient to be discharged by RN.     Medical Decision Making    History:    Supplemental history from: None    External Record(s) reviewed: I reviewed the echocardiogram from 3/30/2023 which showed an EF of 60 to 65% with 4+ severe mitral regurgitation.  There was no thrombus in the left atrial appendage and the left atrium was mild to moderately dilated.    Work Up:    Emergent/Severe conditions considered and evaluated for: ACS, arrhythmia, severe electrolyte abnormality, renal failure, pneumothorax, pulmonary edema, heart failure    I independently reviewed and interpreted EKG and chest x-ray which showed no pneumothorax or significant pulmonary edema.  See full radiology report for all details    In additional to work up documented, I considered the following work up: None    Medications given that require intensive monitoring for toxicity: None    External consultation:    Discussion of management with another provider: Cardiology     Complicating factors:    Care impacted by  chronic illness: Mitral valve prolapse        Care affected by social determinants of health: Access to care on the weekend    Disposition considerations: Consider admission but after blood work and imaging came back and patient had improvement in his symptoms and I discussed with cardiology we opted to discharge the patient home with close follow-up.  He was in agreement  Prescriptions considered/prescribed: None    At the conclusion of the encounter I discussed  the results of all of the tests and the disposition with patient.   All questions were answered.  The patient acknowledged understanding and was involved in the decision making regarding the overall care plan.      I discussed with patient the utility, limitations and findings of the exam/interventions/studies done during this visit as well as the list of differential diagnosis and symptoms to monitor/return to ER for.  Additional verbal discharge instructions were provided.     MEDICATIONS GIVEN IN THE EMERGENCY:  Medications   nitroGLYcerin (NITROSTAT) sublingual tablet 0.4 mg (0.4 mg Sublingual $Given 4/16/23 1841)       NEW PRESCRIPTIONS STARTED AT TODAY'S ER VISIT  Discharge Medication List as of 4/16/2023  7:15 PM             =================================================================    HPI    Triage Note:      Patient information was obtained from: Patient    Use of : N/A         Guille Owusu is a 63 year old male who presents for chest tightness.     Per chart review: On 3/21/2023 at [facility] with Dr. Elio Ram. Patient was recently diagnosed with a heart murmur which is at least 3/6 systolic and heard best at the apex radiating slightly towards the left axilla. EKG from his clinic is normal. Plan for echocardiogram.  Echocardiogram: Left ventricular size, wall motion and function are normal. The ejection fraction is 60-65%. Normal right ventricle size and systolic function. The left atrium is moderately dilated.  "There is prolapse of the posterior mitral leaflet. There is severe (4+) mitral regurgitation. The mitral regurgitant jet is anteriorly directed, which is consistent with posterior leaflet pathology.    Patient endorses seeing his PCP 2 1/2 weeks ago and was told he had a heart murmur. He went to see Dr. Elio Ram who told him he has a severe mitral valve prolapse. He was told to limit exertion to walking only. Since that time he has been feeling fine until he had a sudden onset of shortness of breath and chest tightness that he describes as feeling \"not right\" starting yesterday. He reports going to bed and waking up more short of breath even though he was laying down which was new for him. He went back to sleep, and earlier today he texted Dr. Ram who told him to present to this ED. Patient states currently he is feeling anxious.     Patient endorses wearing a cpap for sleep apnea, and states he was using it last night.   Patient states he has an appointment on Tuesday with Dr. Ram for surgery evaluation. Patient denies leg swelling, or any other complaints at this time.      PAST MEDICAL HISTORY:  History reviewed. No pertinent past medical history.    PAST SURGICAL HISTORY:  History reviewed. No pertinent surgical history.    CURRENT MEDICATIONS:      Current Facility-Administered Medications:      nitroGLYcerin (NITROSTAT) sublingual tablet 0.4 mg, 0.4 mg, Sublingual, Q5 Min PRN, Tamra Barber MD, 0.4 mg at 04/16/23 8357    Current Outpatient Medications:      albuterol (PROAIR HFA/PROVENTIL HFA/VENTOLIN HFA) 108 (90 Base) MCG/ACT inhaler, Inhale 1 puff into the lungs as needed, Disp: , Rfl:      amoxicillin (AMOXIL) 500 MG capsule, TAKE 4 CAPSULES BY MOUTH 1 HOUR PRIOR TO DENTAL APPOINTMENT, Disp: , Rfl:      EPINEPHrine (ANY BX GENERIC EQUIV) 0.3 MG/0.3ML injection 2-pack, 0.3 mg intramuscularly 1X as directed, 2 of the 2 pack, Disp: , Rfl:      fish oil-omega-3 fatty acids 1000 MG " capsule, Take 2,000 mg by mouth daily, Disp: , Rfl:      fluticasone (FLONASE) 50 MCG/ACT nasal spray, 2 sprays, Disp: , Rfl:      ibuprofen (ADVIL/MOTRIN) 200 MG tablet, Take 200-400 mg by mouth, Disp: , Rfl:      loratadine (CLARITIN) 10 MG tablet, 1 tab(s) orally once a day, Disp: , Rfl:      multivitamin, therapeutic (THERA-VIT) TABS tablet, Take 1 tablet by mouth daily, Disp: , Rfl:      Respiratory Therapy Supplies (CARETOUCH CPAP & BIPAP HOSE) MISC, (CUSTOM Rx) C-PAP MACHINE, Disp: , Rfl:      rizatriptan (MAXALT) 10 MG tablet, 1 tab(s) orally may repeat once in 24 hr, no sooner than 2 hr after the first for migraine, 3 month supply, Disp: , Rfl:     ALLERGIES:  Allergies   Allergen Reactions     Other Food Allergy Anaphylaxis     Fruits, cherries, apples, banana - almost everything except for melons and citrus     Horse-Derived Products Other (See Comments)     Other reaction(s): facial swelling  Facial swelling         FAMILY HISTORY:  History reviewed. No pertinent family history.    SOCIAL HISTORY:   Social History     Socioeconomic History     Marital status:    Tobacco Use     Smoking status: Never     Smokeless tobacco: Never   Vaping Use     Vaping status: Never Used       PHYSICAL EXAM    VITAL SIGNS: /65   Pulse 56   Temp 97.4  F (36.3  C)   Resp 20   SpO2 96%    GENERAL: Awake, alert, answering questions appropriately, appears in no distress   SPEECH:  Easy to understand speech, Normal volume and rossy  PULMONARY: No respiratory distress, Lungs clear to auscultation bilaterally  CARDIOVASCULAR: Regular rate and rhythm, Distal pulses present and normal.  ABDOMINAL: Soft, Nondistended, Nontender, No rebound or guarding, No palpable masses  EXTREMITIES: No lower extremity edema.  PSYCH: Normal mood and affect     LAB:  All pertinent labs reviewed and interpreted.  Results for orders placed or performed during the hospital encounter of 04/16/23   POC US ECHO LIMITED    Impression     PROCEDURE:    Emergency Department Limited Bedside Screening Cardiac Ultrasound  INDICATIONS: shortness of breath  PROCEDURE PROVIDER: Tamra Barber   WINDOW AND FINDINGS:   SUB-XYPHOID     :      Grossly normal/symmetric wall motion  Pericardial effusion: No clinically significant pericardial effusion    IMAGES SAVED AND STORED FOR ARCHIVE AND REVIEW: Yes        Chest XR,  PA & LAT    Impression    IMPRESSION: Cardiomediastinal silhouette is normal. Normal vasculature. Lungs and pleural spaces are clear.   Extra Blue Top Tube   Result Value Ref Range    Hold Specimen JIC    Extra Red Top Tube   Result Value Ref Range    Hold Specimen JIC    Extra Green Top (Lithium Heparin) Tube   Result Value Ref Range    Hold Specimen JIC    Extra Purple Top Tube   Result Value Ref Range    Hold Specimen JIC    Basic metabolic panel   Result Value Ref Range    Sodium 138 136 - 145 mmol/L    Potassium 4.0 3.4 - 5.3 mmol/L    Chloride 101 98 - 107 mmol/L    Carbon Dioxide (CO2) 26 22 - 29 mmol/L    Anion Gap 11 7 - 15 mmol/L    Urea Nitrogen 17.5 8.0 - 23.0 mg/dL    Creatinine 1.05 0.67 - 1.17 mg/dL    Calcium 9.3 8.8 - 10.2 mg/dL    Glucose 110 (H) 70 - 99 mg/dL    GFR Estimate 80 >60 mL/min/1.73m2   Result Value Ref Range    Troponin T, High Sensitivity 9 <=22 ng/L   Nt probnp inpatient (BNP)   Result Value Ref Range    N terminal Pro BNP Inpatient 143 0 - 900 pg/mL   Result Value Ref Range    Magnesium 1.9 1.7 - 2.3 mg/dL   CBC with platelets and differential   Result Value Ref Range    WBC Count 6.5 4.0 - 11.0 10e3/uL    RBC Count 4.73 4.40 - 5.90 10e6/uL    Hemoglobin 15.0 13.3 - 17.7 g/dL    Hematocrit 43.9 40.0 - 53.0 %    MCV 93 78 - 100 fL    MCH 31.7 26.5 - 33.0 pg    MCHC 34.2 31.5 - 36.5 g/dL    RDW 13.2 10.0 - 15.0 %    Platelet Count 235 150 - 450 10e3/uL    % Neutrophils 42 %    % Lymphocytes 42 %    % Monocytes 11 %    % Eosinophils 4 %    % Basophils 1 %    % Immature Granulocytes 0 %    NRBCs per 100 WBC 0 <1  /100    Absolute Neutrophils 2.7 1.6 - 8.3 10e3/uL    Absolute Lymphocytes 2.8 0.8 - 5.3 10e3/uL    Absolute Monocytes 0.7 0.0 - 1.3 10e3/uL    Absolute Eosinophils 0.3 0.0 - 0.7 10e3/uL    Absolute Basophils 0.1 0.0 - 0.2 10e3/uL    Absolute Immature Granulocytes 0.0 <=0.4 10e3/uL    Absolute NRBCs 0.0 10e3/uL       RADIOLOGY:  Chest XR,  PA & LAT   Final Result   IMPRESSION: Cardiomediastinal silhouette is normal. Normal vasculature. Lungs and pleural spaces are clear.      POC US ECHO LIMITED   ED Interpretation   PROCEDURE:    Emergency Department Limited Bedside Screening Cardiac Ultrasound  INDICATIONS: shortness of breath  PROCEDURE PROVIDER: Tamra Barber   WINDOW AND FINDINGS:   SUB-XYPHOID     :      Grossly normal/symmetric wall motion  Pericardial effusion: No clinically significant pericardial effusion    IMAGES SAVED AND STORED FOR ARCHIVE AND REVIEW: Yes                   EKG:    Date and time: April 16, 2023 at 1659  Rate: 73 bpm  Rhythm: Sinus rhythm  IA interval: 140 ms  QRS interval: 82 ms  QT/QTc: 366/403 ms  ST changes or T wave changes: No acute ST or T wave abnormalities  Change from prior ECG: No prior to compare to  I have independently reviewed and interpreted this EKG.     PROCEDURES:     POC US ECHO LIMITED   ED Interpretation   PROCEDURE:    Emergency Department Limited Bedside Screening Cardiac Ultrasound  INDICATIONS: shortness of breath  PROCEDURE PROVIDER: Tamra Barber   WINDOW AND FINDINGS:   SUB-XYPHOID     :      Grossly normal/symmetric wall motion  Pericardial effusion: No clinically significant pericardial effusion    IMAGES SAVED AND STORED FOR ARCHIVE AND REVIEW: Yes            I, Aleta Garcia, am serving as a scribe to document services personally performed by Dr. Barber based on my observation and the provider's statements to me. I, Tamra Barber MD attest that Aleta Garcia is acting in a scribe capacity, has observed my performance of the services and has  documented them in accordance with my direction.    Tamra Barber M.D.  Emergency Medicine  [facility] EMERGENCY DEPARTMENT  [facility] [place] [id number] [phone]  Dept: [phone]     Tamra Barber MD  04/16/23 2039

## 2023-04-16 NOTE — TELEPHONE ENCOUNTER
Telephone visit with patient today.  Patient has known severe mitral insufficiency and is scheduled to see cardiac surgery, Dr. David Zeng, on Tuesday.  Patient noted more shortness of breath yesterday at rest.  He had an episode that sounds like paroxysmal nocturnal dyspnea last night.  Today he feels more short breath with some chest tightness which is new for him and has been ongoing since yesterday afternoon.    Have recommended that he be evaluated in the emergency department at [facility] to see if he has evidence of heart failure associated with his severe mitral insufficiency.  If there is any evidence of pulmonary vascular congestion or impending decompensation, I would bring him in overnight with probable heart catheterization tomorrow in anticipation of mitral valve repair.  He could meet and discuss with Dr. Zeng if he is admitted to the hospital.    Armando Ram

## 2023-04-17 ENCOUNTER — OFFICE VISIT (OUTPATIENT)
Dept: CARDIOLOGY | Facility: CLINIC | Age: 63
End: 2023-04-17
Payer: COMMERCIAL

## 2023-04-17 ENCOUNTER — TELEPHONE (OUTPATIENT)
Dept: CARDIOLOGY | Facility: CLINIC | Age: 63
End: 2023-04-17

## 2023-04-17 VITALS
OXYGEN SATURATION: 98 % | RESPIRATION RATE: 18 BRPM | DIASTOLIC BLOOD PRESSURE: 73 MMHG | HEART RATE: 70 BPM | SYSTOLIC BLOOD PRESSURE: 122 MMHG | WEIGHT: 200 LBS | BODY MASS INDEX: 28.7 KG/M2

## 2023-04-17 DIAGNOSIS — I34.0 NONRHEUMATIC MITRAL VALVE REGURGITATION: Primary | ICD-10-CM

## 2023-04-17 LAB
ATRIAL RATE - MUSE: 73 BPM
DIASTOLIC BLOOD PRESSURE - MUSE: NORMAL MMHG
INTERPRETATION ECG - MUSE: NORMAL
P AXIS - MUSE: 53 DEGREES
PR INTERVAL - MUSE: 140 MS
QRS DURATION - MUSE: 82 MS
QT - MUSE: 366 MS
QTC - MUSE: 403 MS
R AXIS - MUSE: 66 DEGREES
SYSTOLIC BLOOD PRESSURE - MUSE: NORMAL MMHG
T AXIS - MUSE: 62 DEGREES
VENTRICULAR RATE- MUSE: 73 BPM

## 2023-04-17 PROCEDURE — 99205 OFFICE O/P NEW HI 60 MIN: CPT | Performed by: SURGERY

## 2023-04-17 NOTE — TELEPHONE ENCOUNTER
4/17/23 Called and spoke with pt regarding rescheduling his 4/18/23 @ 1:30 appt  TO 4/17/23 @ 1:30 with Dr. Lam, due to providers surgery schedule change. Pt understands and agrees to the new plan. cvo

## 2023-04-17 NOTE — LETTER
4/17/2023    RADHA BENDER  [facility] [address]  [facility] [address]    RE: Guille Owusu       Dear Colleague,     I had the pleasure of seeing Guille Owusu in the [facility].  Cardiac and Thoracic Surgery Consultation      Guille Ouwsu MRN# [id number]   YOB: 1960 Age: 63 year old   Date of Admission: (Not on file)     Reason for consult: I was asked by Dr. Armando Ram to evaluate this patient for severe mitral regurgitation.           Assessment and Plan:   Mr. Owusu is a 63-year-old man with severe mitral regurgitation and heart failure. Coronary angiography demonstrates no coronary artery disease I recommend mitral repair. I described the technical details, as well as the expected postoperative course and recovery to the patient. I also discussed the risks and benefits of the procedure. The risks include but are not limited to bleeding, infection, stroke, heart failure, dysrhythmia, respiratory failure, kidney or liver injury, bowel or limb ischemia, and death. The calculated STS risk of mortality is 1%, and the calculated STS risk of major morbidity or mortality is 5-10%. The patient understands these risks and wishes to undergo the operation.     In the rare event that repair is not successful, the patient would need mitral valve replacement.I discussed the option of a bioprosthetic versus a mechanical valve with the patients. The patient understands that a bioprosthetic valve would not require lifelong anticoagulation, but there is a risk of prosthetic valve degeneration. I also explained that while a mechanical valve has unlimited durability, this would require lifelong anticoagulation with Coumadin. There is also a small risk of hearing the valve click. The risk of infection is equal between the two.The patient is going to consider all of this before he makes a decision on a back-up plan.    Surgery will be scheduled in the coming  weeks.    After dental clearance is confirmed, the preoperative evaluation will be complete.              Chief Complaint:   Mr. Owusu is a 63-year-old man with severe mitral regurgitation. Coronary angiography demonstrates no coronary artery disease I recommend mitral repair.    History is obtained from the patient         History of Present Illness:   This patient is a 63 year old male who presents with dyspnea, chest tightness, and paroxysmal nocturnal dyspnea. The patient actually had a hip replacement late last year, and after that, he was noted to have a heart murmur. Echocardiography demonstrated severe mitral regurgitation with an anteriorly directed regurgitant jet, and he was referred for evaluation to Dr. Ram. KARISHMA was performed confirming posterior leaflet prolapse, and actually after that, he became symptomatic and presented to the ER.     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    He has not had syncopal or near syncopal episodes.     Patient is  and his spouse is with him at the time of our evaluation.  He has 2 children.  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              Past Medical History:     Past Medical History:   Diagnosis Date    CHELSEY (obstructive sleep apnea)              Past Surgical History:     Past Surgical History:   Procedure Laterality Date    AS REVISE TOTAL HIP REPLACEMENT Left 01/03/2023    CV CORONARY ANGIOGRAM N/A 4/21/2023    Procedure: Coronary Angiogram;  Surgeon: Javier Adames MD;  Location: [facility] CV    CV LEFT HEART CATH N/A 4/21/2023    Procedure: Left Heart Catheterization;  Surgeon: Javier Adames MD;  Location: [facility] CV               Social History:     Social History     Tobacco Use    Smoking status: Never    Smokeless tobacco: Never   Vaping Use     Vaping status: Never Used   Substance Use Topics    Alcohol use: Yes     Comment: socially             Family History:   No family history on file.          Immunizations:     Immunization History   Administered Date(s) Administered    COVID-19 Vaccine 18+ (Moderna) 11/04/2021    COVID-19 Vaccine Bivalent Booster 12+ (Pfizer) 12/15/2022             Allergies:     Allergies   Allergen Reactions    Other Food Allergy Anaphylaxis     Fruits, cherries, apples, banana - almost everything except for melons and citrus    Horse-Derived Products Other (See Comments)     Other reaction(s): facial swelling  Facial swelling               Medications:     Current Outpatient Medications Ordered in Epic   Medication    albuterol (PROAIR HFA/PROVENTIL HFA/VENTOLIN HFA) 108 (90 Base) MCG/ACT inhaler    amoxicillin (AMOXIL) 500 MG capsule    cholecalciferol (VITAMIN D3) 10 mcg (400 units) TABS tablet    fish oil-omega-3 fatty acids 1000 MG capsule    ibuprofen (ADVIL/MOTRIN) 200 MG tablet    loratadine (CLARITIN) 10 MG tablet    multivitamin, therapeutic (THERA-VIT) TABS tablet    Respiratory Therapy Supplies (CARETOUCH CPAP & BIPAP HOSE) MISC    rizatriptan (MAXALT) 10 MG tablet    EPINEPHrine (ANY BX GENERIC EQUIV) 0.3 MG/0.3ML injection 2-pack    fluticasone (FLONASE) 50 MCG/ACT nasal spray     No current Epic-ordered facility-administered medications on file.             Review of Systems:     The 10 point Review of Systems is negative other than noted in the HPI            Physical Exam:   Vitals were reviewed  Constitutional:   awake, alert, cooperative, no apparent distress, and appears stated age     Eyes:   Lids and lashes normal, pupils equal, round and reactive to light, extra ocular muscles intact, sclera clear, conjunctiva normal     ENT:   normocepalic, without obvious abnormality     Neck:   supple, symmetrical, trachea midline     Lungs:   no increased work of breathing, good air exchange and no retractions      Cardiovascular:   regular rate and rhythm     Abdomen:   non-distended     Musculoskeletal:   no lower extremity pitting edema present  there is no redness, warmth, or swelling of the joints  full range of motion noted  motor strength is 5 out of 5 all extremities bilaterally     Neurologic:   Mental Status Exam:  Level of Alertness:   awake  Orientation:   person, place, time  Memory:   normal  Cranial Nerves:  cranial nerves II-XII are grossly intact  Motor Exam:  moves all extremities well and symmetrically     Neuropsychiatric:   General: normal, calm and normal eye contact  Level of consciousness: alert / normal  Affect: normal     Skin:   no bruising or bleeding, normal skin color, texture, turgor and no redness, warmth, or swelling          Data:   All laboratory data reviewed  All cardiac studies reviewed by me.  All imaging studies reviewed by me.    CARDIAC CATHETERIZATION:  Left Main   The vessel was visualized by selective angiography and is moderate in size. There was 0% vessel disease.      Left Anterior Descending   The vessel was visualized by selective angiography and is moderate in size. There was 0% vessel disease. Ramus has 30% ostial narrowing.      Left Circumflex   The vessel was visualized by selective angiography and is moderate in size. There was 0% vessel disease.      Right Coronary Artery   The vessel was visualized by selective angiography and is moderate in size. There was 0% vessel disease.            TRANSTHORACIC ECHOCARDIOGRAPHY:  Left ventricular size, wall motion and function are normal. The ejection  fraction is 60-65%.  Normal right ventricle size and systolic function.  The left atrium is moderately dilated.  There is prolapse of the posterior mitral leaflet.  There is severe (4+) mitral regurgitation.  The mitral regurgitant jet is anteriorly directed, which is consistent with  posterior leaflet pathology.    TRANSESOPHAGEL ECHOCARDIOGRAPHY:  The mitral valve leaflets  appear myxomatous.  There is prolapse of the medial and lateral scallops of the posterior leaflet.  There is a thin mobile echodensity likely consistent with a ruptured chordae  tendonae.  There is severe (4+) mitral regurgitation.  The visual ejection fraction is 60-65%.  The right ventricle is normal in size and function.  No other significant valve disease.  The left atrium is mild to moderately dilated.  No thrombus is detected in the left atrial appendage.  A contrast injection (Bubble Study) was performed that was negative for flow  across the interatrial septum.    EKG:  Sinus rhythm   Possible Left atrial enlargement   Septal infarct (cited on or before 16-APR-2023)   Abnormal ECG   When compared with ECG of 16-APR-2023 16:59,   No significant change was found           Thank you for allowing me to participate in the care of your patient.      Sincerely,     Aniceto Zeng MD     [facility]  cc:   No referring provider defined for this encounter.

## 2023-04-17 NOTE — LETTER
Cardiovascular Surgery  [facility]                                                                  This patient is scheduled to under go a heart valve repair or replacement.    Please have the dentist sign the attached form and fax or e-mail it to the Cardiovascular Surgery office prior to their surgery date.    Primary fax number:  [facility] [phone]    E-mail:   [facility]. [facility] RN Care Coordinator: [email or web address]  (Evelyn)      Please contact Evelyn Santo RN at [phone].    Thank you,  Cardiovascular Surgery Office                              PATIENT:   NANCY TADEO    :   1960       DATE OF DENTAL VISIT: __________________                                                    ___________________________   was seen by me today for dental clearance prior to heart valve surgery.     ______  No additional treatment is needed prior to surgery.    ______  Further treatment is needed and will be completed prior to surgery date.    ______  Surgery date may need to be rescheduled due to this condition     _________________________________________________________.    Dr. _______________________    Phone _______________________.      Clinic name ____________________________________________________      Address_______________________________________________________

## 2023-04-17 NOTE — PATIENT INSTRUCTIONS
You were seen today in the [facility]    Surgery will be scheduled with May 2. The angiogram  will call you schedule your angiogram, and Evelyn will call you to schedule Pre-Admission Testing (PAT).    Please call HOLGER Rivas surgery coordinator with any questions.  Thank you.    Evelyn Santo RN  Cardiovascular Surgery  Phone [phone]  Fax [phone]

## 2023-04-17 NOTE — DISCHARGE INSTRUCTIONS
You were seen in the Emergency Department today for evaluation of some shortness of breath and chest discomfort.  Your lab work showed no cause of your symptoms. Your imaging studies showed no significant cause of your symptoms.  Follow up with Dr. Zeng on Tuesday to determine definitive management.. Return if you have new or worsening symptoms.

## 2023-04-18 ENCOUNTER — PREP FOR PROCEDURE (OUTPATIENT)
Dept: ADMINISTRATIVE | Facility: CLINIC | Age: 63
End: 2023-04-18

## 2023-04-18 DIAGNOSIS — I34.0 MITRAL REGURGITATION: Primary | ICD-10-CM

## 2023-04-18 DIAGNOSIS — I34.0 MITRAL VALVE INSUFFICIENCY: Primary | ICD-10-CM

## 2023-04-18 RX ORDER — SODIUM CHLORIDE 9 MG/ML
INJECTION, SOLUTION INTRAVENOUS CONTINUOUS
Status: CANCELLED | OUTPATIENT
Start: 2023-04-21

## 2023-04-18 RX ORDER — LIDOCAINE 40 MG/G
CREAM TOPICAL
Status: CANCELLED | OUTPATIENT
Start: 2023-04-18

## 2023-04-18 RX ORDER — ASPIRIN 81 MG/1
243 TABLET, CHEWABLE ORAL ONCE
Status: CANCELLED | OUTPATIENT
Start: 2023-04-21

## 2023-04-18 RX ORDER — ASPIRIN 325 MG
325 TABLET ORAL ONCE
Status: CANCELLED | OUTPATIENT
Start: 2023-04-21 | End: 2023-04-18

## 2023-04-18 RX ORDER — FENTANYL CITRATE 50 UG/ML
25 INJECTION, SOLUTION INTRAMUSCULAR; INTRAVENOUS
Status: CANCELLED | OUTPATIENT
Start: 2023-04-21

## 2023-04-19 DIAGNOSIS — I34.0 NONRHEUMATIC MITRAL VALVE REGURGITATION: Primary | ICD-10-CM

## 2023-04-19 LAB
ABO/RH(D): NORMAL
ANTIBODY SCREEN: NEGATIVE
SPECIMEN EXPIRATION DATE: NORMAL

## 2023-04-19 RX ORDER — DEXMEDETOMIDINE HYDROCHLORIDE 4 UG/ML
.2-1.2 INJECTION, SOLUTION INTRAVENOUS CONTINUOUS
Status: CANCELLED | OUTPATIENT
Start: 2023-05-02

## 2023-04-19 RX ORDER — SODIUM CHLORIDE, SODIUM GLUCONATE, SODIUM ACETATE, POTASSIUM CHLORIDE AND MAGNESIUM CHLORIDE 526; 502; 368; 37; 30 MG/100ML; MG/100ML; MG/100ML; MG/100ML; MG/100ML
1000 INJECTION, SOLUTION INTRAVENOUS
Status: CANCELLED | OUTPATIENT
Start: 2023-05-02 | End: 2023-05-02

## 2023-04-19 RX ORDER — LIDOCAINE 40 MG/G
CREAM TOPICAL
Status: CANCELLED | OUTPATIENT
Start: 2023-04-19

## 2023-04-19 RX ORDER — CHLORHEXIDINE GLUCONATE ORAL RINSE 1.2 MG/ML
10 SOLUTION DENTAL ONCE
Status: CANCELLED | OUTPATIENT
Start: 2023-05-02 | End: 2023-04-19

## 2023-04-19 RX ORDER — CEFAZOLIN SODIUM/WATER 2 G/20 ML
2 SYRINGE (ML) INTRAVENOUS SEE ADMIN INSTRUCTIONS
Status: CANCELLED | OUTPATIENT
Start: 2023-05-02

## 2023-04-19 RX ORDER — CEFAZOLIN SODIUM/WATER 2 G/20 ML
2 SYRINGE (ML) INTRAVENOUS
Status: CANCELLED | OUTPATIENT
Start: 2023-05-02

## 2023-04-19 RX ORDER — PHENYLEPHRINE HCL IN 0.9% NACL 50MG/250ML
.1-6 PLASTIC BAG, INJECTION (ML) INTRAVENOUS CONTINUOUS
Status: CANCELLED | OUTPATIENT
Start: 2023-05-02

## 2023-04-20 ENCOUNTER — LAB (OUTPATIENT)
Dept: CARDIOLOGY | Facility: CLINIC | Age: 63
End: 2023-04-20
Payer: COMMERCIAL

## 2023-04-20 DIAGNOSIS — I34.0 MITRAL VALVE INSUFFICIENCY: ICD-10-CM

## 2023-04-20 LAB
ANION GAP SERPL CALCULATED.3IONS-SCNC: 11 MMOL/L (ref 5–18)
BUN SERPL-MCNC: 14 MG/DL (ref 8–22)
CALCIUM SERPL-MCNC: 8.9 MG/DL (ref 8.5–10.5)
CHLORIDE BLD-SCNC: 106 MMOL/L (ref 98–107)
CO2 SERPL-SCNC: 23 MMOL/L (ref 22–31)
CREAT SERPL-MCNC: 1.07 MG/DL (ref 0.7–1.3)
ERYTHROCYTE [DISTWIDTH] IN BLOOD BY AUTOMATED COUNT: 13.1 % (ref 10–15)
GFR SERPL CREATININE-BSD FRML MDRD: 78 ML/MIN/1.73M2
GLUCOSE BLD-MCNC: 100 MG/DL (ref 70–125)
HCT VFR BLD AUTO: 44.9 % (ref 40–53)
HGB BLD-MCNC: 15 G/DL (ref 13.3–17.7)
MCH RBC QN AUTO: 31.7 PG (ref 26.5–33)
MCHC RBC AUTO-ENTMCNC: 33.4 G/DL (ref 31.5–36.5)
MCV RBC AUTO: 95 FL (ref 78–100)
PLATELET # BLD AUTO: 244 10E3/UL (ref 150–450)
POTASSIUM BLD-SCNC: 3.9 MMOL/L (ref 3.5–5)
RBC # BLD AUTO: 4.73 10E6/UL (ref 4.4–5.9)
SODIUM SERPL-SCNC: 140 MMOL/L (ref 136–145)
WBC # BLD AUTO: 4.9 10E3/UL (ref 4–11)

## 2023-04-20 PROCEDURE — 85027 COMPLETE CBC AUTOMATED: CPT

## 2023-04-20 PROCEDURE — 36415 COLL VENOUS BLD VENIPUNCTURE: CPT

## 2023-04-20 PROCEDURE — 80048 BASIC METABOLIC PNL TOTAL CA: CPT

## 2023-04-20 PROCEDURE — 86901 BLOOD TYPING SEROLOGIC RH(D): CPT

## 2023-04-20 PROCEDURE — 86900 BLOOD TYPING SEROLOGIC ABO: CPT

## 2023-04-20 PROCEDURE — 86850 RBC ANTIBODY SCREEN: CPT

## 2023-04-21 ENCOUNTER — HOSPITAL ENCOUNTER (OUTPATIENT)
Facility: HOSPITAL | Age: 63
Discharge: HOME | End: 2023-04-21
Attending: INTERNAL MEDICINE | Admitting: INTERNAL MEDICINE
Payer: COMMERCIAL

## 2023-04-21 VITALS
BODY MASS INDEX: 28.63 KG/M2 | SYSTOLIC BLOOD PRESSURE: 116 MMHG | HEART RATE: 63 BPM | HEIGHT: 70 IN | WEIGHT: 200 LBS | RESPIRATION RATE: 16 BRPM | TEMPERATURE: 97.6 F | DIASTOLIC BLOOD PRESSURE: 71 MMHG | OXYGEN SATURATION: 95 %

## 2023-04-21 DIAGNOSIS — I34.0 MITRAL VALVE INSUFFICIENCY: ICD-10-CM

## 2023-04-21 LAB
ATRIAL RATE - MUSE: 61 BPM
DIASTOLIC BLOOD PRESSURE - MUSE: NORMAL MMHG
INTERPRETATION ECG - MUSE: NORMAL
P AXIS - MUSE: 83 DEGREES
PR INTERVAL - MUSE: 182 MS
QRS DURATION - MUSE: 90 MS
QT - MUSE: 408 MS
QTC - MUSE: 410 MS
R AXIS - MUSE: 37 DEGREES
SYSTOLIC BLOOD PRESSURE - MUSE: NORMAL MMHG
T AXIS - MUSE: 52 DEGREES
VENTRICULAR RATE- MUSE: 61 BPM

## 2023-04-21 PROCEDURE — 93005 ELECTROCARDIOGRAM TRACING: CPT

## 2023-04-21 PROCEDURE — 999N000054 HC STATISTIC EKG NON-CHARGEABLE

## 2023-04-21 PROCEDURE — 250N000013 HC RX MED GY IP 250 OP 250 PS 637: Performed by: INTERNAL MEDICINE

## 2023-04-21 PROCEDURE — C1887 CATHETER, GUIDING: HCPCS | Performed by: INTERNAL MEDICINE

## 2023-04-21 PROCEDURE — 93458 L HRT ARTERY/VENTRICLE ANGIO: CPT | Mod: 26 | Performed by: INTERNAL MEDICINE

## 2023-04-21 PROCEDURE — 93010 ELECTROCARDIOGRAM REPORT: CPT | Performed by: INTERNAL MEDICINE

## 2023-04-21 PROCEDURE — 258N000003 HC RX IP 258 OP 636: Performed by: SURGERY

## 2023-04-21 PROCEDURE — C1894 INTRO/SHEATH, NON-LASER: HCPCS | Performed by: INTERNAL MEDICINE

## 2023-04-21 PROCEDURE — 255N000002 HC RX 255 OP 636: Performed by: INTERNAL MEDICINE

## 2023-04-21 PROCEDURE — 250N000011 HC RX IP 250 OP 636: Performed by: INTERNAL MEDICINE

## 2023-04-21 PROCEDURE — 250N000013 HC RX MED GY IP 250 OP 250 PS 637: Performed by: NURSE PRACTITIONER

## 2023-04-21 PROCEDURE — 250N000009 HC RX 250: Performed by: INTERNAL MEDICINE

## 2023-04-21 PROCEDURE — 93458 L HRT ARTERY/VENTRICLE ANGIO: CPT | Performed by: INTERNAL MEDICINE

## 2023-04-21 PROCEDURE — 250N000013 HC RX MED GY IP 250 OP 250 PS 637: Performed by: SURGERY

## 2023-04-21 PROCEDURE — 272N000001 HC OR GENERAL SUPPLY STERILE: Performed by: INTERNAL MEDICINE

## 2023-04-21 PROCEDURE — 99152 MOD SED SAME PHYS/QHP 5/>YRS: CPT | Performed by: INTERNAL MEDICINE

## 2023-04-21 RX ORDER — NALOXONE HYDROCHLORIDE 0.4 MG/ML
0.4 INJECTION, SOLUTION INTRAMUSCULAR; INTRAVENOUS; SUBCUTANEOUS
Status: DISCONTINUED | OUTPATIENT
Start: 2023-04-21 | End: 2023-04-21 | Stop reason: HOSPADM

## 2023-04-21 RX ORDER — NALOXONE HYDROCHLORIDE 0.4 MG/ML
0.2 INJECTION, SOLUTION INTRAMUSCULAR; INTRAVENOUS; SUBCUTANEOUS
Status: DISCONTINUED | OUTPATIENT
Start: 2023-04-21 | End: 2023-04-21 | Stop reason: HOSPADM

## 2023-04-21 RX ORDER — SODIUM CHLORIDE 9 MG/ML
INJECTION, SOLUTION INTRAVENOUS CONTINUOUS
Status: DISCONTINUED | OUTPATIENT
Start: 2023-04-21 | End: 2023-04-21 | Stop reason: HOSPADM

## 2023-04-21 RX ORDER — FENTANYL CITRATE 50 UG/ML
25 INJECTION, SOLUTION INTRAMUSCULAR; INTRAVENOUS
Status: DISCONTINUED | OUTPATIENT
Start: 2023-04-21 | End: 2023-04-21 | Stop reason: HOSPADM

## 2023-04-21 RX ORDER — ASPIRIN 325 MG
325 TABLET ORAL ONCE
Status: COMPLETED | OUTPATIENT
Start: 2023-04-21 | End: 2023-04-21

## 2023-04-21 RX ORDER — ASPIRIN 81 MG/1
243 TABLET, CHEWABLE ORAL ONCE
Status: COMPLETED | OUTPATIENT
Start: 2023-04-21 | End: 2023-04-21

## 2023-04-21 RX ORDER — FLUMAZENIL 0.1 MG/ML
0.2 INJECTION, SOLUTION INTRAVENOUS
Status: DISCONTINUED | OUTPATIENT
Start: 2023-04-21 | End: 2023-04-21 | Stop reason: HOSPADM

## 2023-04-21 RX ORDER — LIDOCAINE 40 MG/G
CREAM TOPICAL
Status: DISCONTINUED | OUTPATIENT
Start: 2023-04-21 | End: 2023-04-21 | Stop reason: HOSPADM

## 2023-04-21 RX ORDER — DIAZEPAM 10 MG
10 TABLET ORAL ONCE
Status: COMPLETED | OUTPATIENT
Start: 2023-04-21 | End: 2023-04-21

## 2023-04-21 RX ORDER — OXYCODONE HYDROCHLORIDE 5 MG/1
10 TABLET ORAL EVERY 4 HOURS PRN
Status: DISCONTINUED | OUTPATIENT
Start: 2023-04-21 | End: 2023-04-21 | Stop reason: HOSPADM

## 2023-04-21 RX ORDER — ATROPINE SULFATE 0.1 MG/ML
0.5 INJECTION INTRAVENOUS
Status: DISCONTINUED | OUTPATIENT
Start: 2023-04-21 | End: 2023-04-21 | Stop reason: HOSPADM

## 2023-04-21 RX ORDER — FENTANYL CITRATE 50 UG/ML
INJECTION, SOLUTION INTRAMUSCULAR; INTRAVENOUS
Status: DISCONTINUED | OUTPATIENT
Start: 2023-04-21 | End: 2023-04-21 | Stop reason: HOSPADM

## 2023-04-21 RX ORDER — IODIXANOL 320 MG/ML
INJECTION, SOLUTION INTRAVASCULAR
Status: DISCONTINUED | OUTPATIENT
Start: 2023-04-21 | End: 2023-04-21 | Stop reason: HOSPADM

## 2023-04-21 RX ORDER — OXYCODONE HYDROCHLORIDE 5 MG/1
5 TABLET ORAL EVERY 4 HOURS PRN
Status: DISCONTINUED | OUTPATIENT
Start: 2023-04-21 | End: 2023-04-21 | Stop reason: HOSPADM

## 2023-04-21 RX ORDER — ACETAMINOPHEN 325 MG/1
650 TABLET ORAL EVERY 4 HOURS PRN
Status: DISCONTINUED | OUTPATIENT
Start: 2023-04-21 | End: 2023-04-21 | Stop reason: HOSPADM

## 2023-04-21 RX ADMIN — DIAZEPAM 10 MG: 10 TABLET ORAL at 08:21

## 2023-04-21 RX ADMIN — ACETAMINOPHEN 650 MG: 325 TABLET ORAL at 11:57

## 2023-04-21 RX ADMIN — ASPIRIN 325 MG ORAL TABLET 325 MG: 325 PILL ORAL at 07:16

## 2023-04-21 RX ADMIN — SODIUM CHLORIDE: 9 INJECTION, SOLUTION INTRAVENOUS at 07:16

## 2023-04-21 ASSESSMENT — ACTIVITIES OF DAILY LIVING (ADL)
ADLS_ACUITY_SCORE: 35

## 2023-04-21 ASSESSMENT — EJECTION FRACTION: EF_VALUE: .13

## 2023-04-21 NOTE — INTERVAL H&P NOTE
"I have reviewed the surgical (or preoperative) H&P that is linked to this encounter, and examined the patient. There are no significant changes    Clinical Conditions Present on Arrival:  Clinically Significant Risk Factors Present on Admission                  # Overweight: Estimated body mass index is 28.7 kg/m  as calculated from the following:    Height as of this encounter: 1.778 m (5' 10\").    Weight as of this encounter: 90.7 kg (200 lb).       "

## 2023-04-21 NOTE — DISCHARGE INSTRUCTIONS
Interventional Cardiology  Coronary Angiogram/Angioplasty/Stent/Atherectomy Discharge  Instructions -   Radial (wrist) Approach     The instructions below are to help you understand how to take care of yourself. There is also information about when to call the doctor or emergency services.    **Do not stop your aspirin or platelet inhibitor unless directed by your Cardiologist.  These medications help to prevent platelets in your blood from sticking together and forming a clot.   Examples of these medications are: Ticagrelor (Brilinta), Clopidogrel (Plavix), Prasugrel (Effient)    For 24 hours after procedure:  Do not subject hand/arm to any forceful movements for 24 hours, such as supporting weight when rising from a chair or bed.  Do not drive a car for 24 hours.  The dressing on the puncture site may be removed after 24 hours and left open to air. If minor oozing, you may apply a Band-Aid and remove after 12 hours.   You may shower on the day after your procedure. Do not take a tub bath or wash dishes (no soaking wrist) with the puncture site in water for 3 days after the procedure.    For 48 hours following the procedure:  Do not operate a lawnmower, motorcycle, chainsaw or all-terrain vehicle.  Do not lift anything heavier than 5-10 pounds with affected arm for 5 days.  Avoid excessive bending (flexion/extension) wrist movement.  Do not engage in vigorous exercise (i.e. tennis, golf) using the affected arm for 5 days after discharge.  You may return to work in 72 hours if no complications and no heavy lifting.    If bleeding should occur following discharge:  Sit down and apply firm pressure with your thumb against the puncture site and fingers against back of wrist for 10 minutes.  If the bleeding stops, continue to rest, keeping your wrist still for 2 hours. Notify your doctor as soon as possible.  If bleeding does not stop after 10 minutes or if there is a large amount of bleeding or spurting, call 911  immediately. Do not drive yourself to the hospital.           Contact the Heart Clinic at [phone] if you develop:  Fever over 100.4, that lasts more than one day  Redness, heat, or pus at the puncture site  Change in color or temperature of the hand or arm    Expect mild tingling of hand and tenderness at the puncture site for up to 3 days. You may take Tylenol or a pain medicine recommended by your doctor.        Your Procedural Physician was: Dr Adames / Dr. Fela Craig the phone number is: [phone]    [facility]:  [phone]  If you are calling after hours, please listen to the entire voicemail, a live  will answer at the end of the message

## 2023-04-21 NOTE — PRE-PROCEDURE
GENERAL PRE-PROCEDURE:   Procedure:  Coronary angiogram, left heart cathterization  Date/Time:  4/21/2023 7:18 AM    Written consent obtained?: Yes    Risks and benefits: Risks, benefits and alternatives were discussed    Consent given by:  Patient  Patient states understanding of procedure being performed: Yes    Patient's understanding of procedure matches consent: Yes    Procedure consent matches procedure scheduled: Yes    Expected level of sedation:  Moderate  Appropriately NPO:  Yes  ASA Class:  4 (severe MR, HFpEF)  Mallampati  :  Grade 4- soft palate obscured by base of tongue  Lungs:  Lungs clear with good breath sounds bilaterally  Heart:  Systolic murmur  History & Physical reviewed:  History and physical reviewed and no updates needed  Statement of review:  I have reviewed the lab findings, diagnostic data, medications, and the plan for sedation

## 2023-04-21 NOTE — PLAN OF CARE
Pt alert and oriented. VSS on room air. Pt sinus sheila on monitor. Complains of sob with exertion. Pt prepped and ready for procedure. Wife Radha in room with patient.

## 2023-04-21 NOTE — PROGRESS NOTES
Patient was kept comfortable during post-procedure stay.VSS. Denies pain. Right radial access site remains dry & free from signs of bleeding. Appointments known to pt.  was able to speak with patient post procedure. Post-op instructions given to patient & spouse. Able to ask questions. Verbalized no concerns. Belongings returned. Discharged/ in stable condition.

## 2023-04-24 ENCOUNTER — DOCUMENTATION ONLY (OUTPATIENT)
Dept: OTHER | Facility: CLINIC | Age: 63
End: 2023-04-24
Payer: COMMERCIAL

## 2023-04-24 NOTE — PROGRESS NOTES
Cardiac and Thoracic Surgery Consultation      Guille Owusu MRN# [id number]   YOB: 1960 Age: 63 year old   Date of Admission: (Not on file)     Reason for consult: I was asked by Dr. Armando Ram to evaluate this patient for severe mitral regurgitation.           Assessment and Plan:   Mr. Owusu is a 63-year-old man with severe mitral regurgitation and heart failure. Coronary angiography demonstrates no coronary artery disease I recommend mitral repair. I described the technical details, as well as the expected postoperative course and recovery to the patient. I also discussed the risks and benefits of the procedure. The risks include but are not limited to bleeding, infection, stroke, heart failure, dysrhythmia, respiratory failure, kidney or liver injury, bowel or limb ischemia, and death. The calculated STS risk of mortality is 1%, and the calculated STS risk of major morbidity or mortality is 5-10%. The patient understands these risks and wishes to undergo the operation.     In the rare event that repair is not successful, the patient would need mitral valve replacement.I discussed the option of a bioprosthetic versus a mechanical valve with the patients. The patient understands that a bioprosthetic valve would not require lifelong anticoagulation, but there is a risk of prosthetic valve degeneration. I also explained that while a mechanical valve has unlimited durability, this would require lifelong anticoagulation with Coumadin. There is also a small risk of hearing the valve click. The risk of infection is equal between the two.The patient is going to consider all of this before he makes a decision on a back-up plan.    Surgery will be scheduled in the coming weeks.    After dental clearance is confirmed, the preoperative evaluation will be complete.              Chief Complaint:   Mr. Owusu is a 63-year-old man with severe mitral regurgitation. Coronary angiography demonstrates no  coronary artery disease I recommend mitral repair.    History is obtained from the patient         History of Present Illness:   This patient is a 63 year old male who presents with dyspnea, chest tightness, and paroxysmal nocturnal dyspnea. The patient actually had a hip replacement late last year, and after that, he was noted to have a heart murmur. Echocardiography demonstrated severe mitral regurgitation with an anteriorly directed regurgitant jet, and he was referred for evaluation to Dr. Ram. KARISHMA was performed confirming posterior leaflet prolapse, and actually after that, he became symptomatic and presented to the ER.     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    He has not had syncopal or near syncopal episodes.     Patient is  and his spouse is with him at the time of our evaluation.  He has 2 children.  He continues to work in the [employer] business and is contemplating assisted within 1 year.              Past Medical History:     Past Medical History:   Diagnosis Date     CHELSEY (obstructive sleep apnea)              Past Surgical History:     Past Surgical History:   Procedure Laterality Date     AS REVISE TOTAL HIP REPLACEMENT Left 01/03/2023     CV CORONARY ANGIOGRAM N/A 4/21/2023    Procedure: Coronary Angiogram;  Surgeon: Javier Adames MD;  Location: [facility] CATH [facility] CV     CV LEFT HEART CATH N/A 4/21/2023    Procedure: Left Heart Catheterization;  Surgeon: Javier Adames MD;  Location: [facility] CATH LAB CV               Social History:     Social History     Tobacco Use     Smoking status: Never     Smokeless tobacco: Never   Vaping Use     Vaping status: Never Used   Substance Use Topics     Alcohol use: Yes     Comment: socially             Family History:   No family history on file.          Immunizations:     Immunization History   Administered Date(s)  Administered     COVID-19 Vaccine 18+ (Moderna) 11/04/2021     COVID-19 Vaccine Bivalent Booster 12+ (Pfizer) 12/15/2022             Allergies:     Allergies   Allergen Reactions     Other Food Allergy Anaphylaxis     Fruits, cherries, apples, banana - almost everything except for melons and citrus     Horse-Derived Products Other (See Comments)     Other reaction(s): facial swelling  Facial swelling               Medications:     Current Outpatient Medications Ordered in Epic   Medication     albuterol (PROAIR HFA/PROVENTIL HFA/VENTOLIN HFA) 108 (90 Base) MCG/ACT inhaler     amoxicillin (AMOXIL) 500 MG capsule     cholecalciferol (VITAMIN D3) 10 mcg (400 units) TABS tablet     fish oil-omega-3 fatty acids 1000 MG capsule     ibuprofen (ADVIL/MOTRIN) 200 MG tablet     loratadine (CLARITIN) 10 MG tablet     multivitamin, therapeutic (THERA-VIT) TABS tablet     Respiratory Therapy Supplies (CARETOUCH CPAP & BIPAP HOSE) MISC     rizatriptan (MAXALT) 10 MG tablet     EPINEPHrine (ANY BX GENERIC EQUIV) 0.3 MG/0.3ML injection 2-pack     fluticasone (FLONASE) 50 MCG/ACT nasal spray     No current Epic-ordered facility-administered medications on file.             Review of Systems:     The 10 point Review of Systems is negative other than noted in the HPI            Physical Exam:   Vitals were reviewed  Constitutional:   awake, alert, cooperative, no apparent distress, and appears stated age     Eyes:   Lids and lashes normal, pupils equal, round and reactive to light, extra ocular muscles intact, sclera clear, conjunctiva normal     ENT:   normocepalic, without obvious abnormality     Neck:   supple, symmetrical, trachea midline     Lungs:   no increased work of breathing, good air exchange and no retractions     Cardiovascular:   regular rate and rhythm     Abdomen:   non-distended     Musculoskeletal:   no lower extremity pitting edema present  there is no redness, warmth, or swelling of the joints  full range of  motion noted  motor strength is 5 out of 5 all extremities bilaterally     Neurologic:   Mental Status Exam:  Level of Alertness:   awake  Orientation:   person, place, time  Memory:   normal  Cranial Nerves:  cranial nerves II-XII are grossly intact  Motor Exam:  moves all extremities well and symmetrically     Neuropsychiatric:   General: normal, calm and normal eye contact  Level of consciousness: alert / normal  Affect: normal     Skin:   no bruising or bleeding, normal skin color, texture, turgor and no redness, warmth, or swelling          Data:   All laboratory data reviewed  All cardiac studies reviewed by me.  All imaging studies reviewed by me.    CARDIAC CATHETERIZATION:  Left Main   The vessel was visualized by selective angiography and is moderate in size. There was 0% vessel disease.      Left Anterior Descending   The vessel was visualized by selective angiography and is moderate in size. There was 0% vessel disease. Ramus has 30% ostial narrowing.      Left Circumflex   The vessel was visualized by selective angiography and is moderate in size. There was 0% vessel disease.      Right Coronary Artery   The vessel was visualized by selective angiography and is moderate in size. There was 0% vessel disease.            TRANSTHORACIC ECHOCARDIOGRAPHY:  Left ventricular size, wall motion and function are normal. The ejection  fraction is 60-65%.  Normal right ventricle size and systolic function.  The left atrium is moderately dilated.  There is prolapse of the posterior mitral leaflet.  There is severe (4+) mitral regurgitation.  The mitral regurgitant jet is anteriorly directed, which is consistent with  posterior leaflet pathology.    TRANSESOPHAGEL ECHOCARDIOGRAPHY:  The mitral valve leaflets appear myxomatous.  There is prolapse of the medial and lateral scallops of the posterior leaflet.  There is a thin mobile echodensity likely consistent with a ruptured chordae  tendonae.  There is severe (4+)  mitral regurgitation.  The visual ejection fraction is 60-65%.  The right ventricle is normal in size and function.  No other significant valve disease.  The left atrium is mild to moderately dilated.  No thrombus is detected in the left atrial appendage.  A contrast injection (Bubble Study) was performed that was negative for flow  across the interatrial septum.    EKG:  Sinus rhythm   Possible Left atrial enlargement   Septal infarct (cited on or before 16-APR-2023)   Abnormal ECG   When compared with ECG of 16-APR-2023 16:59,   No significant change was found

## 2023-04-25 ENCOUNTER — HOSPITAL ENCOUNTER (OUTPATIENT)
Dept: SURGERY | Facility: HOSPITAL | Age: 63
Discharge: HOME | End: 2023-04-25
Attending: SURGERY | Admitting: SURGERY
Payer: COMMERCIAL

## 2023-04-25 ENCOUNTER — ANESTHESIA EVENT (OUTPATIENT)
Dept: SURGERY | Facility: HOSPITAL | Age: 63
DRG: 219 | End: 2023-04-25
Payer: COMMERCIAL

## 2023-04-25 VITALS
DIASTOLIC BLOOD PRESSURE: 74 MMHG | WEIGHT: 201.2 LBS | BODY MASS INDEX: 28.87 KG/M2 | HEART RATE: 63 BPM | OXYGEN SATURATION: 96 % | SYSTOLIC BLOOD PRESSURE: 119 MMHG | TEMPERATURE: 98.6 F | RESPIRATION RATE: 16 BRPM

## 2023-04-25 DIAGNOSIS — I34.0 NONRHEUMATIC MITRAL VALVE REGURGITATION: ICD-10-CM

## 2023-04-25 LAB
ABO/RH(D): NORMAL
ALBUMIN UR-MCNC: NEGATIVE MG/DL
ANTIBODY SCREEN: NEGATIVE
APPEARANCE UR: CLEAR
APTT PPP: 32 SECONDS (ref 22–38)
BILIRUB UR QL STRIP: NEGATIVE
COLOR UR AUTO: COLORLESS
GLUCOSE UR STRIP-MCNC: NEGATIVE MG/DL
HBA1C MFR BLD: 5.4 %
HGB UR QL STRIP: NEGATIVE
INR PPP: 1.03 (ref 0.85–1.15)
KETONES UR STRIP-MCNC: NEGATIVE MG/DL
LEUKOCYTE ESTERASE UR QL STRIP: NEGATIVE
MRSA DNA SPEC QL NAA+PROBE: NEGATIVE
NITRATE UR QL: NEGATIVE
PH UR STRIP: 5.5 [PH] (ref 5–7)
RBC URINE: 0 /HPF
SA TARGET DNA: NEGATIVE
SP GR UR STRIP: 1.01 (ref 1–1.03)
SPECIMEN EXPIRATION DATE: NORMAL
UROBILINOGEN UR STRIP-MCNC: <2 MG/DL
WBC URINE: 0 /HPF

## 2023-04-25 PROCEDURE — 81001 URINALYSIS AUTO W/SCOPE: CPT | Performed by: SURGERY

## 2023-04-25 PROCEDURE — 87641 MR-STAPH DNA AMP PROBE: CPT | Performed by: SURGERY

## 2023-04-25 PROCEDURE — 85730 THROMBOPLASTIN TIME PARTIAL: CPT | Performed by: SURGERY

## 2023-04-25 PROCEDURE — 85610 PROTHROMBIN TIME: CPT | Performed by: SURGERY

## 2023-04-25 PROCEDURE — 84134 ASSAY OF PREALBUMIN: CPT | Performed by: SURGERY

## 2023-04-25 PROCEDURE — 83036 HEMOGLOBIN GLYCOSYLATED A1C: CPT | Performed by: SURGERY

## 2023-04-25 PROCEDURE — 36415 COLL VENOUS BLD VENIPUNCTURE: CPT | Performed by: SURGERY

## 2023-04-25 PROCEDURE — 86901 BLOOD TYPING SEROLOGIC RH(D): CPT | Performed by: SURGERY

## 2023-04-25 ASSESSMENT — ENCOUNTER SYMPTOMS: SEIZURES: 0

## 2023-04-25 NOTE — ANESTHESIA PREPROCEDURE EVALUATION
Anesthesia Pre-Procedure Evaluation    Patient: Guille Owusu   MRN: [id number] : 1960        Procedure : Procedure(s):  Mitral valve repair possible replacement with anesthesia transesophageal echocardiogram          Past Medical History:   Diagnosis Date     CHELSEY (obstructive sleep apnea)       Past Surgical History:   Procedure Laterality Date     AS REVISE TOTAL HIP REPLACEMENT Left 2023     CV CORONARY ANGIOGRAM N/A 2023    Procedure: Coronary Angiogram;  Surgeon: Javier Adames MD;  Location: [facility] CV     CV LEFT HEART CATH N/A 2023    Procedure: Left Heart Catheterization;  Surgeon: Javier Adames MD;  Location: [facility] CV      Allergies   Allergen Reactions     Other Food Allergy Anaphylaxis     Fruits, cherries, apples, banana - almost everything except for melons and citrus     Horse-Derived Products Other (See Comments)     Other reaction(s): facial swelling  Facial swelling        Social History     Tobacco Use     Smoking status: Never     Smokeless tobacco: Never   Vaping Use     Vaping status: Never Used   Substance Use Topics     Alcohol use: Yes     Comment: socially      Wt Readings from Last 1 Encounters:   23 90.7 kg (200 lb)        Anesthesia Evaluation            ROS/MED HX  ENT/Pulmonary:     (+) sleep apnea, uses CPAP,     Neurologic:     (+) migraines,  (-) no seizures and no CVA   Cardiovascular: Comment: 3/2023 KARISHMA  The mitral valve leaflets appear myxomatous.  There is prolapse of the medial and lateral scallops of the posterior leaflet.  There is a thin mobile echodensity likely consistent with a ruptured chordae  tendonae.  There is severe (4+) mitral regurgitation.  The visual ejection fraction is 60-65%.  The right ventricle is normal in size and function.  No other significant valve disease.  The left atrium is mild to moderately dilated.  No thrombus is detected in the left atrial appendage.  A contrast injection (Bubble  Study) was performed that was negative for flow  across the interatrial septum.    (+) -----valvular problems/murmurs type: MR     METS/Exercise Tolerance:     Hematologic:  - neg hematologic  ROS     Musculoskeletal:  - neg musculoskeletal ROS     GI/Hepatic:  - neg GI/hepatic ROS     Renal/Genitourinary:  - neg Renal ROS     Endo:  - neg endo ROS     Psychiatric/Substance Use:  - neg psychiatric ROS     Infectious Disease:  - neg infectious disease ROS     Malignancy:       Other:            Physical Exam    Airway  airway exam normal      Mallampati: II   TM distance: > 3 FB   Neck ROM: full   Mouth opening: > 3 cm    Respiratory Devices and Support         Dental       (+) Minor Abnormalities - some fillings, tiny chips      Cardiovascular   cardiovascular exam normal       Rhythm and rate: regular and normal     Pulmonary   pulmonary exam normal        breath sounds clear to auscultation           OUTSIDE LABS:  CBC:   Lab Results   Component Value Date    WBC 4.9 04/20/2023    WBC 6.5 04/16/2023    HGB 15.0 04/20/2023    HGB 15.0 04/16/2023    HCT 44.9 04/20/2023    HCT 43.9 04/16/2023     04/20/2023     04/16/2023     BMP:   Lab Results   Component Value Date     04/20/2023     04/16/2023    POTASSIUM 3.9 04/20/2023    POTASSIUM 4.0 04/16/2023    CHLORIDE 106 04/20/2023    CHLORIDE 101 04/16/2023    CO2 23 04/20/2023    CO2 26 04/16/2023    BUN 14 04/20/2023    BUN 17.5 04/16/2023    CR 1.07 04/20/2023    CR 1.05 04/16/2023     04/20/2023     (H) 04/16/2023     COAGS:   Lab Results   Component Value Date    PTT 32 04/25/2023    INR 1.03 04/25/2023     POC: No results found for: BGM, HCG, HCGS  HEPATIC: No results found for: ALBUMIN, PROTTOTAL, ALT, AST, GGT, ALKPHOS, BILITOTAL, BILIDIRECT, ELADIA  OTHER:   Lab Results   Component Value Date    A1C 5.4 04/25/2023    RY 8.9 04/20/2023    MAG 1.9 04/16/2023       Anesthesia Plan    ASA Status:  4      Anesthesia Type:  General.     - Airway: ETT         Techniques and Equipment:     - Lines/Monitors: 2nd IV, Arterial Line, Central Line, PAC, CVP, NIRS, KARISHMA            KARISHMA Absolute Contra-indication: NONE            KARISHMA Relative Contra-indication: NONE     - Blood: Blood in Room     Consents    Anesthesia Plan(s) and associated risks, benefits, and realistic alternatives discussed. Questions answered and patient/representative(s) expressed understanding.    - Discussed:     - Discussed with:  Patient, Spouse      - Extended Intubation/Ventilatory Support Discussed: Yes.      - Patient is DNR/DNI Status: No    Use of blood products discussed: Yes.     - Discussed with: Patient.     - Consented: consented to blood products            Reason for refusal: other.     Postoperative Care    Pain management: Multi-modal analgesia.   PONV prophylaxis: Ondansetron (or other 5HT-3), Dexamethasone or Solumedrol     Comments:                Peter Murillo MD

## 2023-04-25 NOTE — PHARMACY-ADMISSION MEDICATION HISTORY
Pharmacist Admission Medication History    Admission medication history is complete. The information provided in this note is only as accurate as the sources available at the time of the update.    Medication reconciliation/reorder completed by provider prior to medication history? No    Information Source(s): Patient and CareEverywhere/SureScripts via in-person    Pertinent Information: N/A    Changes made to PTA medication list:    Added: None    Deleted: Amoxicillin, fluticasone    Changed: Ibuprofen    Allergies reviewed with patient and updates made in EHR: no    Medication History Completed By: Kristan Varela [facility] 4/25/2023 9:53 AM    Prior to Admission medications    Medication Sig Last Dose Taking? Auth Provider Long Term End Date   albuterol (PROAIR HFA/PROVENTIL HFA/VENTOLIN HFA) 108 (90 Base) MCG/ACT inhaler Inhale 1 puff into the lungs as needed More than a month Yes Reported, Patient Yes    cholecalciferol (VITAMIN D3) 10 mcg (400 units) TABS tablet Take 50 mcg by mouth daily  Yes Reported, Patient     EPINEPHrine (ANY BX GENERIC EQUIV) 0.3 MG/0.3ML injection 2-pack 0.3 mg intramuscularly 1X as directed, 2 of the 2 pack More than a month Yes Reported, Patient     fish oil-omega-3 fatty acids 1000 MG capsule Take 2,000 mg by mouth daily  Yes Reported, Patient     ibuprofen (ADVIL/MOTRIN) 200 MG tablet Take 200-400 mg by mouth every 6 hours as needed  Yes Reported, Patient     loratadine (CLARITIN) 10 MG tablet 1 tab(s) orally once a day  Yes Reported, Patient     multivitamin, therapeutic (THERA-VIT) TABS tablet Take 1 tablet by mouth daily  Yes Reported, Patient     rizatriptan (MAXALT) 10 MG tablet 1 tab(s) orally may repeat once in 24 hr, no sooner than 2 hr after the first for migraine, 3 month supply More than a month Yes Reported, Patient     Respiratory Therapy Supplies (CARETOUCH CPAP & BIPAP HOSE) MISC (CUSTOM Rx) C-PAP MACHINE   Reported, Patient

## 2023-04-25 NOTE — PROGRESS NOTES
SPIRITUAL HEALTH SERVICES NOTE  [facility]; REYNALDO/Pre-OP    SPIRITUAL ASSESSMENT  Recently had hip surgery  Feels optimistic about the timing of this surgery  Primary concern is getting through surgery  Reports good support around him  His Gnosticist gerald is a source of strength and comfort  Identifies hopes/goals for the year ahead    SPIRITUAL CARE NOTE  Pre-surgical visit. Met with Dank and his wife Radha. Dank is currently scheduled to have a MVR with Dr. Zeng on May 2nd. While he is still adjusting to this idea of heart surgery, Dank notes that he trusts his care team and feels that he has a good plan in place. Dank recently recovered from hip surgery and states that it went well. He reports that his is pretty good about listening to his body and has noticed increasing SOB in the last few weeks. Dank and Radha's primary concern is getting through surgery. He notes that he has practiced slow, deep breathing while scuba diving in the past and that he hopes to remember this while on the ventilator. As a patient, Dank typically prefers the temperature a bit cooler and does not like overhead lights.     Dank and Radha report good support from their two adult children, Mu-ism and greater community. Radha is able to work from home while Dank recovers. Dank denies any concerns about time off of work. When asked about future goals, they talked about hoping to spend some time at their cabin on [facility] and Dank's hopes to retire and hike the [place] next spring. Their Gnosticist gerald is a source of strength and comfort. They share that Radha was diagnosed with a difficult cancer 12 years ago and that they have repeatedly seen God's faithfulness to them in the years since. They welcome prayer and support after surgery.     Time Spent with Patient/Family: 35 minutes    Plan of Care: Will follow-up with patient/family after surgery    Evelyn Kenyon M.Div.      Office: [phone] (for non-urgent requests)  Please Vocera or page through [facility] for time-sensitive requests

## 2023-04-26 LAB — PREALB SERPL IA-MCNC: 29 MG/DL (ref 15–45)

## 2023-04-28 ENCOUNTER — LAB (OUTPATIENT)
Dept: FAMILY MEDICINE | Facility: CLINIC | Age: 63
End: 2023-04-28
Attending: SURGERY
Payer: COMMERCIAL

## 2023-04-28 DIAGNOSIS — I34.0 NONRHEUMATIC MITRAL VALVE REGURGITATION: ICD-10-CM

## 2023-04-28 PROCEDURE — U0005 INFEC AGEN DETEC AMPLI PROBE: HCPCS

## 2023-04-28 PROCEDURE — 99207 PR NO CHARGE NURSE ONLY: CPT

## 2023-04-28 PROCEDURE — U0003 INFECTIOUS AGENT DETECTION BY NUCLEIC ACID (DNA OR RNA); SEVERE ACUTE RESPIRATORY SYNDROME CORONAVIRUS 2 (SARS-COV-2) (CORONAVIRUS DISEASE [COVID-19]), AMPLIFIED PROBE TECHNIQUE, MAKING USE OF HIGH THROUGHPUT TECHNOLOGIES AS DESCRIBED BY CMS-2020-01-R: HCPCS

## 2023-04-29 LAB — SARS-COV-2 RNA RESP QL NAA+PROBE: NEGATIVE

## 2023-05-01 ENCOUNTER — TELEPHONE (OUTPATIENT)
Dept: CARDIOLOGY | Facility: CLINIC | Age: 63
End: 2023-05-01
Payer: COMMERCIAL

## 2023-05-01 NOTE — TELEPHONE ENCOUNTER
Patient called and noted a mild rash on his chest and back. He notes he tried on some new shirts this weekend, but has not changed any medications. He has discontinued all his supplements. Will email some pictures of the rash to CV RN.

## 2023-05-02 ENCOUNTER — HOSPITAL ENCOUNTER (INPATIENT)
Facility: HOSPITAL | Age: 63
LOS: 4 days | Discharge: HOME | DRG: 219 | End: 2023-05-06
Attending: SURGERY | Admitting: SURGERY
Payer: COMMERCIAL

## 2023-05-02 ENCOUNTER — ANESTHESIA (OUTPATIENT)
Dept: SURGERY | Facility: HOSPITAL | Age: 63
DRG: 219 | End: 2023-05-02
Payer: COMMERCIAL

## 2023-05-02 ENCOUNTER — APPOINTMENT (OUTPATIENT)
Dept: RADIOLOGY | Facility: HOSPITAL | Age: 63
DRG: 219 | End: 2023-05-02
Attending: PHYSICIAN ASSISTANT
Payer: COMMERCIAL

## 2023-05-02 DIAGNOSIS — Z98.890 S/P MVR (MITRAL VALVE REPAIR): Primary | ICD-10-CM

## 2023-05-02 PROBLEM — I34.0 MITRAL REGURGITATION: Status: ACTIVE | Noted: 2023-05-02

## 2023-05-02 LAB
ALBUMIN SERPL BCG-MCNC: 3.7 G/DL (ref 3.5–5.2)
ALP SERPL-CCNC: 54 U/L (ref 40–129)
ALT SERPL W P-5'-P-CCNC: 16 U/L (ref 10–50)
ANION GAP SERPL CALCULATED.3IONS-SCNC: 11 MMOL/L (ref 7–15)
APTT PPP: 76 SECONDS (ref 22–38)
APTT PPP: 77 SECONDS (ref 22–38)
AST SERPL W P-5'-P-CCNC: 43 U/L (ref 10–50)
ATRIAL RATE - MUSE: 89 BPM
BASE EXCESS BLDA CALC-SCNC: -1.2 MMOL/L
BASE EXCESS BLDA CALC-SCNC: -2.1 MMOL/L
BASE EXCESS BLDA CALC-SCNC: -3 MMOL/L
BASE EXCESS BLDA CALC-SCNC: 0.8 MMOL/L
BASE EXCESS BLDA CALC-SCNC: 1.7 MMOL/L
BASE EXCESS BLDV CALC-SCNC: 3 MMOL/L
BILIRUB SERPL-MCNC: 0.9 MG/DL
BLD PROD TYP BPU: NORMAL
BLD PROD TYP BPU: NORMAL
BLOOD COMPONENT TYPE: NORMAL
BLOOD COMPONENT TYPE: NORMAL
BUN SERPL-MCNC: 17.8 MG/DL (ref 8–23)
CA-I BLD-MCNC: 1.03 MMOL/L (ref 1.11–1.3)
CA-I BLD-MCNC: 1.05 MMOL/L (ref 1.11–1.3)
CA-I BLD-MCNC: 1.17 MMOL/L (ref 1.11–1.3)
CA-I BLD-MCNC: 1.21 MMOL/L (ref 1.11–1.3)
CALCIUM SERPL-MCNC: 8.3 MG/DL (ref 8.8–10.2)
CALCIUM, IONIZED MEASURED: 1.1 MMOL/L (ref 1.11–1.3)
CALCIUM, IONIZED MEASURED: 1.11 MMOL/L (ref 1.11–1.3)
CHLORIDE SERPL-SCNC: 109 MMOL/L (ref 98–107)
CODING SYSTEM: NORMAL
CODING SYSTEM: NORMAL
COHGB MFR BLD: 100 % (ref 96–97)
COHGB MFR BLD: 99.6 % (ref 96–97)
CREAT SERPL-MCNC: 1.13 MG/DL (ref 0.67–1.17)
CROSSMATCH: NORMAL
CROSSMATCH: NORMAL
DEPRECATED HCO3 PLAS-SCNC: 22 MMOL/L (ref 22–29)
DIASTOLIC BLOOD PRESSURE - MUSE: NORMAL MMHG
ERYTHROCYTE [DISTWIDTH] IN BLOOD BY AUTOMATED COUNT: 12.8 % (ref 10–15)
ERYTHROCYTE [DISTWIDTH] IN BLOOD BY AUTOMATED COUNT: 12.9 % (ref 10–15)
FIBRINOGEN PPP-MCNC: 188 MG/DL (ref 170–490)
GFR SERPL CREATININE-BSD FRML MDRD: 73 ML/MIN/1.73M2
GLUCOSE BLD-MCNC: 110 MG/DL (ref 70–125)
GLUCOSE BLD-MCNC: 126 MG/DL (ref 70–125)
GLUCOSE BLD-MCNC: 155 MG/DL (ref 70–125)
GLUCOSE BLD-MCNC: 162 MG/DL (ref 70–125)
GLUCOSE BLDC GLUCOMTR-MCNC: 110 MG/DL (ref 70–99)
GLUCOSE BLDC GLUCOMTR-MCNC: 121 MG/DL (ref 70–99)
GLUCOSE BLDC GLUCOMTR-MCNC: 130 MG/DL (ref 70–99)
GLUCOSE BLDC GLUCOMTR-MCNC: 137 MG/DL (ref 70–99)
GLUCOSE BLDC GLUCOMTR-MCNC: 141 MG/DL (ref 70–99)
GLUCOSE BLDC GLUCOMTR-MCNC: 145 MG/DL (ref 70–99)
GLUCOSE BLDC GLUCOMTR-MCNC: 146 MG/DL (ref 70–99)
GLUCOSE BLDC GLUCOMTR-MCNC: 153 MG/DL (ref 70–99)
GLUCOSE BLDC GLUCOMTR-MCNC: 166 MG/DL (ref 70–99)
GLUCOSE BLDC GLUCOMTR-MCNC: 167 MG/DL (ref 70–99)
GLUCOSE BLDC GLUCOMTR-MCNC: 171 MG/DL (ref 70–99)
GLUCOSE BLDC GLUCOMTR-MCNC: 173 MG/DL (ref 70–99)
GLUCOSE BLDC GLUCOMTR-MCNC: 192 MG/DL (ref 70–99)
GLUCOSE SERPL-MCNC: 125 MG/DL (ref 70–99)
HCO3 BLD-SCNC: 23 MMOL/L (ref 23–29)
HCO3 BLD-SCNC: 23 MMOL/L (ref 23–29)
HCO3 BLDA-SCNC: 23 MMOL/L (ref 23–29)
HCO3 BLDA-SCNC: 24 MMOL/L (ref 23–29)
HCO3 BLDA-SCNC: 26 MMOL/L (ref 23–29)
HCO3 BLDV-SCNC: 26 MMOL/L (ref 24–30)
HCT VFR BLD AUTO: 40.2 % (ref 40–53)
HCT VFR BLD AUTO: 40.9 % (ref 40–53)
HGB BLD-MCNC: 12.5 G/DL (ref 13.3–17.7)
HGB BLD-MCNC: 12.9 G/DL (ref 13.3–17.7)
HGB BLD-MCNC: 13.6 G/DL (ref 13.3–17.7)
HGB BLD-MCNC: 13.7 G/DL (ref 13.3–17.7)
HGB BLD-MCNC: 13.7 G/DL (ref 13.3–17.7)
HGB BLD-MCNC: 14.4 G/DL (ref 13.3–17.7)
INR PPP: 1.3 (ref 0.85–1.15)
INR PPP: 1.38 (ref 0.85–1.15)
INTERPRETATION ECG - MUSE: NORMAL
ION CA PH 7.4: 1.08 MMOL/L (ref 1.11–1.3)
ION CA PH 7.4: 1.11 MMOL/L (ref 1.11–1.3)
ISSUE DATE AND TIME: NORMAL
ISSUE DATE AND TIME: NORMAL
LACTATE BLD-SCNC: 1.1 MMOL/L (ref 0.7–2)
LACTATE BLD-SCNC: 1.1 MMOL/L (ref 0.7–2)
LACTATE BLD-SCNC: 1.2 MMOL/L (ref 0.7–2)
LACTATE BLD-SCNC: 2.5 MMOL/L (ref 0.7–2)
LACTATE SERPL-SCNC: 2.1 MMOL/L (ref 0.7–2)
MAGNESIUM SERPL-MCNC: 3.1 MG/DL (ref 1.7–2.3)
MCH RBC QN AUTO: 31.1 PG (ref 26.5–33)
MCH RBC QN AUTO: 31.6 PG (ref 26.5–33)
MCHC RBC AUTO-ENTMCNC: 33.5 G/DL (ref 31.5–36.5)
MCHC RBC AUTO-ENTMCNC: 33.8 G/DL (ref 31.5–36.5)
MCV RBC AUTO: 93 FL (ref 78–100)
MCV RBC AUTO: 93 FL (ref 78–100)
OXYHGB MFR BLD: 98 % (ref 96–97)
OXYHGB MFR BLD: >98.5 % (ref 96–97)
P AXIS - MUSE: 89 DEGREES
PCO2 BLD: 37 MM HG (ref 35–45)
PCO2 BLD: 40 MM HG (ref 35–45)
PCO2 BLDA: 43 MM HG (ref 35–45)
PCO2 BLDA: 44 MM HG (ref 35–45)
PCO2 BLDA: 54 MM HG (ref 35–45)
PCO2 BLDV: 50 MM HG (ref 35–50)
PH BLD: 7.36 [PH] (ref 7.37–7.44)
PH BLD: 7.41 [PH] (ref 7.37–7.44)
PH BLDA: 7.3 [PH] (ref 7.37–7.44)
PH BLDA: 7.34 [PH] (ref 7.37–7.44)
PH BLDA: 7.4 [PH] (ref 7.37–7.44)
PH BLDV: 7.36 [PH] (ref 7.35–7.45)
PH: 7.36 (ref 7.35–7.45)
PH: 7.4 (ref 7.35–7.45)
PHOSPHATE SERPL-MCNC: 2.3 MG/DL (ref 2.5–4.5)
PLATELET # BLD AUTO: 177 10E3/UL (ref 150–450)
PLATELET # BLD AUTO: 193 10E3/UL (ref 150–450)
PO2 BLD: 128 MM HG (ref 80–90)
PO2 BLD: 240 MM HG (ref 80–90)
PO2 BLDA: 149 MM HG (ref 80–90)
PO2 BLDA: 217 MM HG (ref 80–90)
PO2 BLDA: 373 MM HG (ref 80–90)
PO2 BLDV: 55 MM HG (ref 25–47)
POTASSIUM BLD-SCNC: 4 MMOL/L (ref 3.5–5)
POTASSIUM BLD-SCNC: 4.6 MMOL/L (ref 3.5–5)
POTASSIUM BLD-SCNC: 4.7 MMOL/L (ref 3.5–5)
POTASSIUM BLD-SCNC: 4.9 MMOL/L (ref 3.5–5)
POTASSIUM SERPL-SCNC: 4.5 MMOL/L (ref 3.4–5.3)
POTASSIUM SERPL-SCNC: 4.5 MMOL/L (ref 3.4–5.3)
PR INTERVAL - MUSE: 196 MS
PROT SERPL-MCNC: 6 G/DL (ref 6.4–8.3)
QRS DURATION - MUSE: 90 MS
QT - MUSE: 376 MS
QTC - MUSE: 457 MS
R AXIS - MUSE: 91 DEGREES
RBC # BLD AUTO: 4.31 10E6/UL (ref 4.4–5.9)
RBC # BLD AUTO: 4.41 10E6/UL (ref 4.4–5.9)
SATV LHE POCT: 85.5 % (ref 70–75)
SODIUM BLD-SCNC: 140 MMOL/L (ref 136–145)
SODIUM BLD-SCNC: 140 MMOL/L (ref 136–145)
SODIUM BLD-SCNC: 141 MMOL/L (ref 136–145)
SODIUM BLD-SCNC: 142 MMOL/L (ref 136–145)
SODIUM SERPL-SCNC: 142 MMOL/L (ref 136–145)
SYSTOLIC BLOOD PRESSURE - MUSE: NORMAL MMHG
T AXIS - MUSE: 73 DEGREES
TEMPERATURE: 37 DEGREES C
TEMPERATURE: 37 DEGREES C
UNIT ABO/RH: NORMAL
UNIT ABO/RH: NORMAL
UNIT NUMBER: NORMAL
UNIT NUMBER: NORMAL
UNIT STATUS: NORMAL
UNIT STATUS: NORMAL
UNIT TYPE ISBT: 1700
UNIT TYPE ISBT: 1700
VENTRICULAR RATE- MUSE: 89 BPM
WBC # BLD AUTO: 16.2 10E3/UL (ref 4–11)
WBC # BLD AUTO: 20.4 10E3/UL (ref 4–11)

## 2023-05-02 PROCEDURE — 85027 COMPLETE CBC AUTOMATED: CPT | Performed by: PHYSICIAN ASSISTANT

## 2023-05-02 PROCEDURE — 33427 REPAIR OF MITRAL VALVE: CPT | Performed by: SURGERY

## 2023-05-02 PROCEDURE — 360N000079 HC SURGERY LEVEL 6, PER MIN: Performed by: SURGERY

## 2023-05-02 PROCEDURE — 250N000025 HC SEVOFLURANE, PER MIN: Performed by: SURGERY

## 2023-05-02 PROCEDURE — A4217 STERILE WATER/SALINE, 500 ML: HCPCS | Performed by: SURGERY

## 2023-05-02 PROCEDURE — 85610 PROTHROMBIN TIME: CPT

## 2023-05-02 PROCEDURE — 84132 ASSAY OF SERUM POTASSIUM: CPT

## 2023-05-02 PROCEDURE — 250N000011 HC RX IP 250 OP 636: Performed by: ANESTHESIOLOGY

## 2023-05-02 PROCEDURE — 82805 BLOOD GASES W/O2 SATURATION: CPT | Performed by: PHYSICIAN ASSISTANT

## 2023-05-02 PROCEDURE — 99291 CRITICAL CARE FIRST HOUR: CPT | Mod: FT | Performed by: NURSE PRACTITIONER

## 2023-05-02 PROCEDURE — 250N000013 HC RX MED GY IP 250 OP 250 PS 637: Performed by: SURGERY

## 2023-05-02 PROCEDURE — 370N000017 HC ANESTHESIA TECHNICAL FEE, PER MIN: Performed by: SURGERY

## 2023-05-02 PROCEDURE — 258N000003 HC RX IP 258 OP 636

## 2023-05-02 PROCEDURE — 85384 FIBRINOGEN ACTIVITY: CPT

## 2023-05-02 PROCEDURE — 258N000003 HC RX IP 258 OP 636: Performed by: SURGERY

## 2023-05-02 PROCEDURE — 272N000004 HC RX 272: Performed by: SURGERY

## 2023-05-02 PROCEDURE — 250N000011 HC RX IP 250 OP 636

## 2023-05-02 PROCEDURE — 84132 ASSAY OF SERUM POTASSIUM: CPT | Performed by: PHYSICIAN ASSISTANT

## 2023-05-02 PROCEDURE — 999N000157 HC STATISTIC RCP TIME EA 10 MIN

## 2023-05-02 PROCEDURE — C1889 IMPLANT/INSERT DEVICE, NOC: HCPCS | Performed by: SURGERY

## 2023-05-02 PROCEDURE — 88305 TISSUE EXAM BY PATHOLOGIST: CPT | Mod: TC | Performed by: SURGERY

## 2023-05-02 PROCEDURE — 83605 ASSAY OF LACTIC ACID: CPT | Performed by: PHYSICIAN ASSISTANT

## 2023-05-02 PROCEDURE — 999N000141 HC STATISTIC PRE-PROCEDURE NURSING ASSESSMENT: Performed by: SURGERY

## 2023-05-02 PROCEDURE — 250N000009 HC RX 250: Performed by: SURGERY

## 2023-05-02 PROCEDURE — 250N000009 HC RX 250

## 2023-05-02 PROCEDURE — 82330 ASSAY OF CALCIUM: CPT | Performed by: SURGERY

## 2023-05-02 PROCEDURE — 258N000003 HC RX IP 258 OP 636: Performed by: PHYSICIAN ASSISTANT

## 2023-05-02 PROCEDURE — 999N000065 XR CHEST PORT 1 VIEW

## 2023-05-02 PROCEDURE — 85027 COMPLETE CBC AUTOMATED: CPT

## 2023-05-02 PROCEDURE — 80053 COMPREHEN METABOLIC PANEL: CPT

## 2023-05-02 PROCEDURE — 999N000253 HC STATISTIC WEANING TRIALS

## 2023-05-02 PROCEDURE — 999N000259 HC STATISTIC EXTUBATION

## 2023-05-02 PROCEDURE — 250N000011 HC RX IP 250 OP 636: Performed by: SURGERY

## 2023-05-02 PROCEDURE — 83735 ASSAY OF MAGNESIUM: CPT | Performed by: PHYSICIAN ASSISTANT

## 2023-05-02 PROCEDURE — 82330 ASSAY OF CALCIUM: CPT | Performed by: PHYSICIAN ASSISTANT

## 2023-05-02 PROCEDURE — 410N000003 HC PER-PERFUSION 1ST 30 MIN: Performed by: SURGERY

## 2023-05-02 PROCEDURE — 85730 THROMBOPLASTIN TIME PARTIAL: CPT

## 2023-05-02 PROCEDURE — 410N000004: Performed by: SURGERY

## 2023-05-02 PROCEDURE — 85730 THROMBOPLASTIN TIME PARTIAL: CPT | Performed by: PHYSICIAN ASSISTANT

## 2023-05-02 PROCEDURE — 258N000003 HC RX IP 258 OP 636: Performed by: ANESTHESIOLOGY

## 2023-05-02 PROCEDURE — 93005 ELECTROCARDIOGRAM TRACING: CPT

## 2023-05-02 PROCEDURE — 200N000001 HC R&B ICU

## 2023-05-02 PROCEDURE — 5A1221Z PERFORMANCE OF CARDIAC OUTPUT, CONTINUOUS: ICD-10-PCS | Performed by: SURGERY

## 2023-05-02 PROCEDURE — 999N000055 HC STATISTIC END TITIAL CO2 MONITORING

## 2023-05-02 PROCEDURE — 02B70ZK EXCISION OF LEFT ATRIAL APPENDAGE, OPEN APPROACH: ICD-10-PCS | Performed by: SURGERY

## 2023-05-02 PROCEDURE — 86923 COMPATIBILITY TEST ELECTRIC: CPT | Performed by: SURGERY

## 2023-05-02 PROCEDURE — C1898 LEAD, PMKR, OTHER THAN TRANS: HCPCS | Performed by: SURGERY

## 2023-05-02 PROCEDURE — 250N000013 HC RX MED GY IP 250 OP 250 PS 637: Performed by: PHYSICIAN ASSISTANT

## 2023-05-02 PROCEDURE — 258N000001 HC RX 258: Performed by: SURGERY

## 2023-05-02 PROCEDURE — 02UG0JZ SUPPLEMENT MITRAL VALVE WITH SYNTHETIC SUBSTITUTE, OPEN APPROACH: ICD-10-PCS | Performed by: SURGERY

## 2023-05-02 PROCEDURE — 272N000001 HC OR GENERAL SUPPLY STERILE: Performed by: SURGERY

## 2023-05-02 PROCEDURE — 85610 PROTHROMBIN TIME: CPT | Performed by: PHYSICIAN ASSISTANT

## 2023-05-02 PROCEDURE — 250N000011 HC RX IP 250 OP 636: Performed by: PHYSICIAN ASSISTANT

## 2023-05-02 PROCEDURE — 82805 BLOOD GASES W/O2 SATURATION: CPT | Performed by: NURSE PRACTITIONER

## 2023-05-02 PROCEDURE — C1713 ANCHOR/SCREW BN/BN,TIS/BN: HCPCS | Performed by: SURGERY

## 2023-05-02 PROCEDURE — 93010 ELECTROCARDIOGRAM REPORT: CPT | Performed by: INTERNAL MEDICINE

## 2023-05-02 PROCEDURE — 84295 ASSAY OF SERUM SODIUM: CPT | Performed by: PHYSICIAN ASSISTANT

## 2023-05-02 PROCEDURE — 84100 ASSAY OF PHOSPHORUS: CPT | Performed by: PHYSICIAN ASSISTANT

## 2023-05-02 PROCEDURE — P9016 RBC LEUKOCYTES REDUCED: HCPCS | Performed by: SURGERY

## 2023-05-02 DEVICE — SU STERNAL WIRE SINGLE #6 046-032: Type: IMPLANTABLE DEVICE | Site: STERNUM | Status: FUNCTIONAL

## 2023-05-02 DEVICE — RING 638RL34 CG FUTURE ANNULO 29L
Type: IMPLANTABLE DEVICE | Site: HEART | Status: FUNCTIONAL
Brand: CG FUTURE™

## 2023-05-02 DEVICE — SU MYOSTERNAL WIRE  046-267: Type: IMPLANTABLE DEVICE | Site: STERNUM | Status: FUNCTIONAL

## 2023-05-02 RX ORDER — SODIUM CHLORIDE 9 MG/ML
INJECTION, SOLUTION INTRAVENOUS CONTINUOUS PRN
Status: DISCONTINUED | OUTPATIENT
Start: 2023-05-02 | End: 2023-05-02

## 2023-05-02 RX ORDER — ACETAMINOPHEN 325 MG/1
650 TABLET ORAL EVERY 4 HOURS PRN
Status: DISCONTINUED | OUTPATIENT
Start: 2023-05-12 | End: 2023-05-06 | Stop reason: HOSPADM

## 2023-05-02 RX ORDER — PROCHLORPERAZINE MALEATE 10 MG
10 TABLET ORAL EVERY 6 HOURS PRN
Status: DISCONTINUED | OUTPATIENT
Start: 2023-05-02 | End: 2023-05-06 | Stop reason: HOSPADM

## 2023-05-02 RX ORDER — CEFAZOLIN SODIUM/WATER 2 G/20 ML
2 SYRINGE (ML) INTRAVENOUS
Status: COMPLETED | OUTPATIENT
Start: 2023-05-02 | End: 2023-05-02

## 2023-05-02 RX ORDER — CHLORHEXIDINE GLUCONATE ORAL RINSE 1.2 MG/ML
15 SOLUTION DENTAL EVERY 12 HOURS
Status: DISCONTINUED | OUTPATIENT
Start: 2023-05-02 | End: 2023-05-02

## 2023-05-02 RX ORDER — NALOXONE HYDROCHLORIDE 0.4 MG/ML
0.2 INJECTION, SOLUTION INTRAMUSCULAR; INTRAVENOUS; SUBCUTANEOUS
Status: DISCONTINUED | OUTPATIENT
Start: 2023-05-02 | End: 2023-05-06 | Stop reason: HOSPADM

## 2023-05-02 RX ORDER — CALCIUM GLUCONATE 20 MG/ML
2 INJECTION, SOLUTION INTRAVENOUS
Status: DISCONTINUED | OUTPATIENT
Start: 2023-05-02 | End: 2023-05-06 | Stop reason: HOSPADM

## 2023-05-02 RX ORDER — SODIUM CHLORIDE, SODIUM GLUCONATE, SODIUM ACETATE, POTASSIUM CHLORIDE AND MAGNESIUM CHLORIDE 526; 502; 368; 37; 30 MG/100ML; MG/100ML; MG/100ML; MG/100ML; MG/100ML
1000 INJECTION, SOLUTION INTRAVENOUS
Status: DISCONTINUED | OUTPATIENT
Start: 2023-05-02 | End: 2023-05-02

## 2023-05-02 RX ORDER — HYDROMORPHONE HCL IN WATER/PF 6 MG/30 ML
0.4 PATIENT CONTROLLED ANALGESIA SYRINGE INTRAVENOUS
Status: DISCONTINUED | OUTPATIENT
Start: 2023-05-02 | End: 2023-05-04

## 2023-05-02 RX ORDER — SODIUM CHLORIDE, SODIUM LACTATE, POTASSIUM CHLORIDE, CALCIUM CHLORIDE 600; 310; 30; 20 MG/100ML; MG/100ML; MG/100ML; MG/100ML
INJECTION, SOLUTION INTRAVENOUS CONTINUOUS
Status: DISCONTINUED | OUTPATIENT
Start: 2023-05-02 | End: 2023-05-02 | Stop reason: HOSPADM

## 2023-05-02 RX ORDER — DEXMEDETOMIDINE HYDROCHLORIDE 4 UG/ML
.2-1.2 INJECTION, SOLUTION INTRAVENOUS CONTINUOUS
Status: DISCONTINUED | OUTPATIENT
Start: 2023-05-02 | End: 2023-05-02 | Stop reason: HOSPADM

## 2023-05-02 RX ORDER — DEXTROSE MONOHYDRATE 25 G/50ML
25-50 INJECTION, SOLUTION INTRAVENOUS
Status: DISCONTINUED | OUTPATIENT
Start: 2023-05-02 | End: 2023-05-03

## 2023-05-02 RX ORDER — FENTANYL CITRATE 50 UG/ML
INJECTION, SOLUTION INTRAMUSCULAR; INTRAVENOUS PRN
Status: DISCONTINUED | OUTPATIENT
Start: 2023-05-02 | End: 2023-05-02

## 2023-05-02 RX ORDER — METHADONE HYDROCHLORIDE 10 MG/ML
INJECTION, SOLUTION INTRAMUSCULAR; INTRAVENOUS; SUBCUTANEOUS
Status: DISCONTINUED
Start: 2023-05-02 | End: 2023-05-02 | Stop reason: HOSPADM

## 2023-05-02 RX ORDER — CEFAZOLIN SODIUM/WATER 2 G/20 ML
2 SYRINGE (ML) INTRAVENOUS SEE ADMIN INSTRUCTIONS
Status: DISCONTINUED | OUTPATIENT
Start: 2023-05-02 | End: 2023-05-02 | Stop reason: HOSPADM

## 2023-05-02 RX ORDER — GLYCOPYRROLATE 0.2 MG/ML
INJECTION, SOLUTION INTRAMUSCULAR; INTRAVENOUS PRN
Status: DISCONTINUED | OUTPATIENT
Start: 2023-05-02 | End: 2023-05-02

## 2023-05-02 RX ORDER — BISACODYL 10 MG
10 SUPPOSITORY, RECTAL RECTAL DAILY PRN
Status: DISCONTINUED | OUTPATIENT
Start: 2023-05-02 | End: 2023-05-06 | Stop reason: HOSPADM

## 2023-05-02 RX ORDER — LIDOCAINE 40 MG/G
CREAM TOPICAL
Status: DISCONTINUED | OUTPATIENT
Start: 2023-05-02 | End: 2023-05-02 | Stop reason: HOSPADM

## 2023-05-02 RX ORDER — HYDRALAZINE HYDROCHLORIDE 20 MG/ML
10 INJECTION INTRAMUSCULAR; INTRAVENOUS EVERY 30 MIN PRN
Status: DISCONTINUED | OUTPATIENT
Start: 2023-05-02 | End: 2023-05-06 | Stop reason: HOSPADM

## 2023-05-02 RX ORDER — HEPARIN SODIUM 1000 [USP'U]/ML
INJECTION, SOLUTION INTRAVENOUS; SUBCUTANEOUS PRN
Status: DISCONTINUED | OUTPATIENT
Start: 2023-05-02 | End: 2023-05-02

## 2023-05-02 RX ORDER — MULTIVITAMIN,THERAPEUTIC
1 TABLET ORAL DAILY
Status: DISCONTINUED | OUTPATIENT
Start: 2023-05-03 | End: 2023-05-06 | Stop reason: HOSPADM

## 2023-05-02 RX ORDER — PROPOFOL 10 MG/ML
INJECTION, EMULSION INTRAVENOUS PRN
Status: DISCONTINUED | OUTPATIENT
Start: 2023-05-02 | End: 2023-05-02

## 2023-05-02 RX ORDER — PROTAMINE SULFATE 10 MG/ML
INJECTION, SOLUTION INTRAVENOUS PRN
Status: DISCONTINUED | OUTPATIENT
Start: 2023-05-02 | End: 2023-05-02

## 2023-05-02 RX ORDER — ONDANSETRON 4 MG/1
4 TABLET, ORALLY DISINTEGRATING ORAL EVERY 6 HOURS PRN
Status: DISCONTINUED | OUTPATIENT
Start: 2023-05-02 | End: 2023-05-06 | Stop reason: HOSPADM

## 2023-05-02 RX ORDER — LORATADINE 10 MG/1
10 TABLET ORAL DAILY
Status: DISCONTINUED | OUTPATIENT
Start: 2023-05-03 | End: 2023-05-06 | Stop reason: HOSPADM

## 2023-05-02 RX ORDER — HEPARIN SODIUM 5000 [USP'U]/.5ML
5000 INJECTION, SOLUTION INTRAVENOUS; SUBCUTANEOUS EVERY 8 HOURS
Status: DISCONTINUED | OUTPATIENT
Start: 2023-05-03 | End: 2023-05-06 | Stop reason: HOSPADM

## 2023-05-02 RX ORDER — CHLORHEXIDINE GLUCONATE ORAL RINSE 1.2 MG/ML
10 SOLUTION DENTAL ONCE
Status: COMPLETED | OUTPATIENT
Start: 2023-05-02 | End: 2023-05-02

## 2023-05-02 RX ORDER — ONDANSETRON 2 MG/ML
4 INJECTION INTRAMUSCULAR; INTRAVENOUS EVERY 6 HOURS PRN
Status: DISCONTINUED | OUTPATIENT
Start: 2023-05-02 | End: 2023-05-06 | Stop reason: HOSPADM

## 2023-05-02 RX ORDER — ALBUTEROL SULFATE 90 UG/1
1-2 AEROSOL, METERED RESPIRATORY (INHALATION)
Status: DISCONTINUED | OUTPATIENT
Start: 2023-05-02 | End: 2023-05-06 | Stop reason: HOSPADM

## 2023-05-02 RX ORDER — ATROPINE SULFATE 0.4 MG/ML
AMPUL (ML) INJECTION PRN
Status: DISCONTINUED | OUTPATIENT
Start: 2023-05-02 | End: 2023-05-02

## 2023-05-02 RX ORDER — LIDOCAINE HYDROCHLORIDE 10 MG/ML
INJECTION, SOLUTION INFILTRATION; PERINEURAL PRN
Status: DISCONTINUED | OUTPATIENT
Start: 2023-05-02 | End: 2023-05-02

## 2023-05-02 RX ORDER — KETAMINE HYDROCHLORIDE 10 MG/ML
INJECTION INTRAMUSCULAR; INTRAVENOUS PRN
Status: DISCONTINUED | OUTPATIENT
Start: 2023-05-02 | End: 2023-05-02

## 2023-05-02 RX ORDER — NITROGLYCERIN 10 MG/100ML
INJECTION INTRAVENOUS PRN
Status: DISCONTINUED | OUTPATIENT
Start: 2023-05-02 | End: 2023-05-02

## 2023-05-02 RX ORDER — EPHEDRINE SULFATE 50 MG/ML
INJECTION, SOLUTION INTRAMUSCULAR; INTRAVENOUS; SUBCUTANEOUS PRN
Status: DISCONTINUED | OUTPATIENT
Start: 2023-05-02 | End: 2023-05-02

## 2023-05-02 RX ORDER — DEXMEDETOMIDINE HYDROCHLORIDE 4 UG/ML
.2-.7 INJECTION, SOLUTION INTRAVENOUS CONTINUOUS
Status: DISCONTINUED | OUTPATIENT
Start: 2023-05-02 | End: 2023-05-03

## 2023-05-02 RX ORDER — DEXAMETHASONE SODIUM PHOSPHATE 4 MG/ML
INJECTION, SOLUTION INTRA-ARTICULAR; INTRALESIONAL; INTRAMUSCULAR; INTRAVENOUS; SOFT TISSUE PRN
Status: DISCONTINUED | OUTPATIENT
Start: 2023-05-02 | End: 2023-05-02

## 2023-05-02 RX ORDER — MAGNESIUM SULFATE 4 G/50ML
4 INJECTION INTRAVENOUS ONCE
Status: COMPLETED | OUTPATIENT
Start: 2023-05-02 | End: 2023-05-02

## 2023-05-02 RX ORDER — MUPIROCIN 20 MG/G
0.5 OINTMENT TOPICAL 2 TIMES DAILY
Status: DISCONTINUED | OUTPATIENT
Start: 2023-05-02 | End: 2023-05-02

## 2023-05-02 RX ORDER — CALCIUM GLUCONATE 20 MG/ML
1 INJECTION, SOLUTION INTRAVENOUS
Status: DISCONTINUED | OUTPATIENT
Start: 2023-05-02 | End: 2023-05-06 | Stop reason: HOSPADM

## 2023-05-02 RX ORDER — POLYETHYLENE GLYCOL 3350 17 G/17G
17 POWDER, FOR SOLUTION ORAL DAILY
Status: DISCONTINUED | OUTPATIENT
Start: 2023-05-03 | End: 2023-05-06 | Stop reason: HOSPADM

## 2023-05-02 RX ORDER — OXYCODONE HYDROCHLORIDE 5 MG/1
5 TABLET ORAL EVERY 4 HOURS PRN
Status: DISCONTINUED | OUTPATIENT
Start: 2023-05-02 | End: 2023-05-06 | Stop reason: HOSPADM

## 2023-05-02 RX ORDER — OXYCODONE HYDROCHLORIDE 5 MG/1
10 TABLET ORAL EVERY 4 HOURS PRN
Status: DISCONTINUED | OUTPATIENT
Start: 2023-05-02 | End: 2023-05-06 | Stop reason: HOSPADM

## 2023-05-02 RX ORDER — PHENYLEPHRINE HCL IN 0.9% NACL 50MG/250ML
.1-6 PLASTIC BAG, INJECTION (ML) INTRAVENOUS CONTINUOUS
Status: DISCONTINUED | OUTPATIENT
Start: 2023-05-02 | End: 2023-05-02 | Stop reason: HOSPADM

## 2023-05-02 RX ORDER — ACETAMINOPHEN 325 MG/1
975 TABLET ORAL EVERY 8 HOURS
Status: DISCONTINUED | OUTPATIENT
Start: 2023-05-02 | End: 2023-05-06 | Stop reason: HOSPADM

## 2023-05-02 RX ORDER — NITROGLYCERIN 5 MG/ML
VIAL (ML) INTRAVENOUS
Status: DISCONTINUED
Start: 2023-05-02 | End: 2023-05-02 | Stop reason: HOSPADM

## 2023-05-02 RX ORDER — AMOXICILLIN 250 MG
1 CAPSULE ORAL 2 TIMES DAILY
Status: DISCONTINUED | OUTPATIENT
Start: 2023-05-02 | End: 2023-05-06 | Stop reason: HOSPADM

## 2023-05-02 RX ORDER — FENTANYL CITRATE 50 UG/ML
25-50 INJECTION, SOLUTION INTRAMUSCULAR; INTRAVENOUS
Status: DISCONTINUED | OUTPATIENT
Start: 2023-05-02 | End: 2023-05-03

## 2023-05-02 RX ORDER — CEFAZOLIN SODIUM 2 G/100ML
2 INJECTION, SOLUTION INTRAVENOUS EVERY 8 HOURS
Status: COMPLETED | OUTPATIENT
Start: 2023-05-02 | End: 2023-05-03

## 2023-05-02 RX ORDER — VITAMIN B COMPLEX
50 TABLET ORAL DAILY
Status: DISCONTINUED | OUTPATIENT
Start: 2023-05-03 | End: 2023-05-06 | Stop reason: HOSPADM

## 2023-05-02 RX ORDER — LIDOCAINE HYDROCHLORIDE 10 MG/ML
INJECTION, SOLUTION INFILTRATION; PERINEURAL
Status: COMPLETED | OUTPATIENT
Start: 2023-05-02 | End: 2023-05-02

## 2023-05-02 RX ORDER — LIDOCAINE 4 G/G
2-4 PATCH TOPICAL
Status: DISCONTINUED | OUTPATIENT
Start: 2023-05-02 | End: 2023-05-06 | Stop reason: HOSPADM

## 2023-05-02 RX ORDER — NALOXONE HYDROCHLORIDE 0.4 MG/ML
0.4 INJECTION, SOLUTION INTRAMUSCULAR; INTRAVENOUS; SUBCUTANEOUS
Status: DISCONTINUED | OUTPATIENT
Start: 2023-05-02 | End: 2023-05-06 | Stop reason: HOSPADM

## 2023-05-02 RX ORDER — NICOTINE POLACRILEX 4 MG
15-30 LOZENGE BUCCAL
Status: DISCONTINUED | OUTPATIENT
Start: 2023-05-02 | End: 2023-05-03

## 2023-05-02 RX ORDER — ASPIRIN 81 MG/1
81 TABLET, CHEWABLE ORAL DAILY
Status: DISCONTINUED | OUTPATIENT
Start: 2023-05-03 | End: 2023-05-06 | Stop reason: HOSPADM

## 2023-05-02 RX ORDER — METHADONE HYDROCHLORIDE 10 MG/ML
20 INJECTION, SOLUTION INTRAMUSCULAR; INTRAVENOUS; SUBCUTANEOUS ONCE
Status: COMPLETED | OUTPATIENT
Start: 2023-05-02 | End: 2023-05-02

## 2023-05-02 RX ORDER — PHENYLEPHRINE HCL IN 0.9% NACL 50MG/250ML
.1-4 PLASTIC BAG, INJECTION (ML) INTRAVENOUS CONTINUOUS
Status: DISCONTINUED | OUTPATIENT
Start: 2023-05-02 | End: 2023-05-03

## 2023-05-02 RX ORDER — HYDROMORPHONE HCL IN WATER/PF 6 MG/30 ML
0.2 PATIENT CONTROLLED ANALGESIA SYRINGE INTRAVENOUS
Status: DISCONTINUED | OUTPATIENT
Start: 2023-05-02 | End: 2023-05-04

## 2023-05-02 RX ORDER — PANTOPRAZOLE SODIUM 40 MG/1
40 TABLET, DELAYED RELEASE ORAL
Status: DISCONTINUED | OUTPATIENT
Start: 2023-05-03 | End: 2023-05-06 | Stop reason: HOSPADM

## 2023-05-02 RX ADMIN — CHLORHEXIDINE GLUCONATE 10 ML: 1.2 SOLUTION ORAL at 06:48

## 2023-05-02 RX ADMIN — SODIUM CHLORIDE, POTASSIUM CHLORIDE, SODIUM LACTATE AND CALCIUM CHLORIDE 250 ML: 600; 310; 30; 20 INJECTION, SOLUTION INTRAVENOUS at 14:35

## 2023-05-02 RX ADMIN — ATROPINE SULFATE 0.4 MG: 0.4 INJECTION INTRAMUSCULAR; INTRAVENOUS; SUBCUTANEOUS at 08:16

## 2023-05-02 RX ADMIN — PHENYLEPHRINE HYDROCHLORIDE 50 MCG: 10 INJECTION INTRAVENOUS at 08:54

## 2023-05-02 RX ADMIN — MAGNESIUM SULFATE HEPTAHYDRATE 4 G: 80 INJECTION, SOLUTION INTRAVENOUS at 06:53

## 2023-05-02 RX ADMIN — Medication 5 MG: at 08:00

## 2023-05-02 RX ADMIN — HEPARIN SODIUM 2500 UNITS: 1000 INJECTION INTRAVENOUS; SUBCUTANEOUS at 08:40

## 2023-05-02 RX ADMIN — GLYCOPYRROLATE 0.2 MG: 0.2 INJECTION INTRAMUSCULAR; INTRAVENOUS at 08:02

## 2023-05-02 RX ADMIN — Medication 5 MG: at 08:03

## 2023-05-02 RX ADMIN — SODIUM CHLORIDE, POTASSIUM CHLORIDE, SODIUM LACTATE AND CALCIUM CHLORIDE: 600; 310; 30; 20 INJECTION, SOLUTION INTRAVENOUS at 06:45

## 2023-05-02 RX ADMIN — ACETAMINOPHEN 975 MG: 325 TABLET ORAL at 20:10

## 2023-05-02 RX ADMIN — Medication 2 G: at 07:55

## 2023-05-02 RX ADMIN — SODIUM PHOSPHATE, MONOBASIC, MONOHYDRATE AND SODIUM PHOSPHATE, DIBASIC, ANHYDROUS 15 MMOL: 142; 276 INJECTION, SOLUTION INTRAVENOUS at 14:49

## 2023-05-02 RX ADMIN — PROPOFOL 150 MG: 10 INJECTION, EMULSION INTRAVENOUS at 07:36

## 2023-05-02 RX ADMIN — ROCURONIUM BROMIDE 30 MG: 50 INJECTION, SOLUTION INTRAVENOUS at 10:05

## 2023-05-02 RX ADMIN — HYDROMORPHONE HYDROCHLORIDE 0.2 MG: 0.2 INJECTION, SOLUTION INTRAMUSCULAR; INTRAVENOUS; SUBCUTANEOUS at 21:53

## 2023-05-02 RX ADMIN — PHENYLEPHRINE HYDROCHLORIDE 50 MCG: 10 INJECTION INTRAVENOUS at 11:07

## 2023-05-02 RX ADMIN — SENNOSIDES AND DOCUSATE SODIUM 1 TABLET: 50; 8.6 TABLET ORAL at 20:10

## 2023-05-02 RX ADMIN — LIDOCAINE HYDROCHLORIDE 5 ML: 10 INJECTION, SOLUTION INFILTRATION; PERINEURAL at 07:36

## 2023-05-02 RX ADMIN — ATROPINE SULFATE 0.4 MG: 0.4 INJECTION INTRAMUSCULAR; INTRAVENOUS; SUBCUTANEOUS at 10:26

## 2023-05-02 RX ADMIN — HYDROMORPHONE HYDROCHLORIDE 0.4 MG: 0.2 INJECTION, SOLUTION INTRAMUSCULAR; INTRAVENOUS; SUBCUTANEOUS at 12:21

## 2023-05-02 RX ADMIN — METOPROLOL TARTRATE 12.5 MG: 25 TABLET, FILM COATED ORAL at 06:46

## 2023-05-02 RX ADMIN — VANCOMYCIN HYDROCHLORIDE 1500 MG: 5 INJECTION, POWDER, LYOPHILIZED, FOR SOLUTION INTRAVENOUS at 18:51

## 2023-05-02 RX ADMIN — SODIUM CHLORIDE 7.5 G: 900 INJECTION, SOLUTION INTRAVENOUS at 08:19

## 2023-05-02 RX ADMIN — INSULIN HUMAN 3 UNITS/HR: 1 INJECTION, SOLUTION INTRAVENOUS at 09:59

## 2023-05-02 RX ADMIN — Medication 20 MG: at 08:58

## 2023-05-02 RX ADMIN — Medication 10 MG: at 08:17

## 2023-05-02 RX ADMIN — CALCIUM GLUCONATE 1 G: 20 INJECTION, SOLUTION INTRAVENOUS at 20:52

## 2023-05-02 RX ADMIN — ROCURONIUM BROMIDE 50 MG: 50 INJECTION, SOLUTION INTRAVENOUS at 09:16

## 2023-05-02 RX ADMIN — HYDROMORPHONE HYDROCHLORIDE 0.2 MG: 0.2 INJECTION, SOLUTION INTRAMUSCULAR; INTRAVENOUS; SUBCUTANEOUS at 19:40

## 2023-05-02 RX ADMIN — PHENYLEPHRINE HYDROCHLORIDE 50 MCG: 10 INJECTION INTRAVENOUS at 11:30

## 2023-05-02 RX ADMIN — VANCOMYCIN HYDROCHLORIDE 1500 MG: 5 INJECTION, POWDER, LYOPHILIZED, FOR SOLUTION INTRAVENOUS at 06:57

## 2023-05-02 RX ADMIN — AMINOCAPROIC ACID 1 G/HR: 250 INJECTION, SOLUTION INTRAVENOUS at 09:14

## 2023-05-02 RX ADMIN — HYDROMORPHONE HYDROCHLORIDE 0.2 MG: 0.2 INJECTION, SOLUTION INTRAMUSCULAR; INTRAVENOUS; SUBCUTANEOUS at 17:29

## 2023-05-02 RX ADMIN — DESMOPRESSIN ACETATE 27.2 MCG: 40 INJECTION, SOLUTION INTRAVENOUS; SUBCUTANEOUS at 10:54

## 2023-05-02 RX ADMIN — KETAMINE HYDROCHLORIDE 20 MG: 10 INJECTION, SOLUTION INTRAMUSCULAR; INTRAVENOUS at 07:20

## 2023-05-02 RX ADMIN — DEXAMETHASONE SODIUM PHOSPHATE 10 MG: 4 INJECTION, SOLUTION INTRA-ARTICULAR; INTRALESIONAL; INTRAMUSCULAR; INTRAVENOUS; SOFT TISSUE at 10:46

## 2023-05-02 RX ADMIN — PROTAMINE SULFATE 220 MG: 10 INJECTION, SOLUTION INTRAVENOUS at 10:38

## 2023-05-02 RX ADMIN — ROCURONIUM BROMIDE 70 MG: 50 INJECTION, SOLUTION INTRAVENOUS at 07:36

## 2023-05-02 RX ADMIN — CEFAZOLIN SODIUM 2 G: 2 INJECTION, SOLUTION INTRAVENOUS at 16:33

## 2023-05-02 RX ADMIN — DEXMEDETOMIDINE HYDROCHLORIDE 0.5 MCG/KG/HR: 400 INJECTION INTRAVENOUS at 08:19

## 2023-05-02 RX ADMIN — CEFAZOLIN SODIUM 2 G: 2 INJECTION, SOLUTION INTRAVENOUS at 23:16

## 2023-05-02 RX ADMIN — Medication 50 MCG/KG/MIN: at 10:57

## 2023-05-02 RX ADMIN — ONDANSETRON 4 MG: 2 INJECTION INTRAMUSCULAR; INTRAVENOUS at 21:41

## 2023-05-02 RX ADMIN — EPINEPHRINE 0.02 MCG/KG/MIN: 1 INJECTION INTRAMUSCULAR; INTRAVENOUS; SUBCUTANEOUS at 10:25

## 2023-05-02 RX ADMIN — SODIUM CHLORIDE: 9 INJECTION, SOLUTION INTRAVENOUS at 08:15

## 2023-05-02 RX ADMIN — ROCURONIUM BROMIDE 30 MG: 50 INJECTION, SOLUTION INTRAVENOUS at 08:25

## 2023-05-02 RX ADMIN — SODIUM CHLORIDE, POTASSIUM CHLORIDE, SODIUM LACTATE AND CALCIUM CHLORIDE 250 ML: 600; 310; 30; 20 INJECTION, SOLUTION INTRAVENOUS at 22:06

## 2023-05-02 RX ADMIN — CALCIUM GLUCONATE 1 G: 20 INJECTION, SOLUTION INTRAVENOUS at 12:22

## 2023-05-02 RX ADMIN — KETAMINE HYDROCHLORIDE 30 MG: 10 INJECTION, SOLUTION INTRAMUSCULAR; INTRAVENOUS at 07:36

## 2023-05-02 RX ADMIN — FENTANYL CITRATE 100 MCG: 50 INJECTION, SOLUTION INTRAMUSCULAR; INTRAVENOUS at 07:20

## 2023-05-02 RX ADMIN — LIDOCAINE HYDROCHLORIDE 5 ML: 10 INJECTION, SOLUTION INFILTRATION; PERINEURAL at 07:26

## 2023-05-02 RX ADMIN — OXYCODONE HYDROCHLORIDE 5 MG: 5 TABLET ORAL at 20:36

## 2023-05-02 RX ADMIN — Medication 5 MG: at 07:58

## 2023-05-02 RX ADMIN — NITROGLYCERIN 40 MCG: 10 INJECTION INTRAVENOUS at 10:38

## 2023-05-02 RX ADMIN — LIDOCAINE 1 PATCH: 4 PATCH TOPICAL at 13:19

## 2023-05-02 ASSESSMENT — ACTIVITIES OF DAILY LIVING (ADL)
WALKING_OR_CLIMBING_STAIRS_DIFFICULTY: NO
ADLS_ACUITY_SCORE: 35
CONCENTRATING,_REMEMBERING_OR_MAKING_DECISIONS_DIFFICULTY: NO
ADLS_ACUITY_SCORE: 20
DOING_ERRANDS_INDEPENDENTLY_DIFFICULTY: NO
DRESSING/BATHING_DIFFICULTY: NO
ADLS_ACUITY_SCORE: 35
WEAR_GLASSES_OR_BLIND: YES
ADLS_ACUITY_SCORE: 35
TOILETING_ISSUES: NO
ADLS_ACUITY_SCORE: 20
VISION_MANAGEMENT: GLASSES
CHANGE_IN_FUNCTIONAL_STATUS_SINCE_ONSET_OF_CURRENT_ILLNESS/INJURY: NO
DIFFICULTY_EATING/SWALLOWING: NO
ADLS_ACUITY_SCORE: 35
FALL_HISTORY_WITHIN_LAST_SIX_MONTHS: NO
ADLS_ACUITY_SCORE: 35

## 2023-05-02 NOTE — PROGRESS NOTES
PROVIDER RESTRAINT FOR NON-VIOLENT BEHAVIOR FACE TO FACE EVALUATION    Patient's Immediate Situation:  Patient demonstrated the following behaviors: recovering from anesthesia, reaches for critical lines and tubes.     Patient's Reaction to the intervention:  Does patient understand the reason for restraint/seclusion? Unable to express     Medical Condition:  Is there any evidence of compromise of Skin integrity, Respiratory, Cardiovascular, Musculoskeletal, Hydration?   No    Behavioral Condition:  In consultation with the RN, is there a need to continue this restraint or seclusion? Yes    See Restraint Flowsheet for complete restraint documentation and assessment.'    Stephenie Gonzalez, [facility] Pulmonary/Critical Care

## 2023-05-02 NOTE — ANESTHESIA PROCEDURE NOTES
Airway       Patient location during procedure: OR       Procedure Start/Stop Times: 5/2/2023 7:40 AM  Staff -        Anesthesiologist:  Rebecca Cameron MD       CRNA: Ana Oviedo APRN CRNA       Performed By: CRNA  Consent for Airway        Urgency: elective  Indications and Patient Condition       Indications for airway management: price-procedural       Induction type:intravenous       Mask difficulty assessment: 1 - vent by mask    Final Airway Details       Final airway type: endotracheal airway       Successful airway: ETT - single, Oral and Single subglottic suction  Endotracheal Airway Details        ETT size (mm): 8.0       Cuffed: yes       Successful intubation technique: direct laryngoscopy       DL Blade Type: Kenyon 2       Grade View of Cords: 1       Adjucts: stylet and tooth guard       Position: Right       Measured from: gums/teeth       Secured at (cm): 23       Bite block used: None    Post intubation assessment        Placement verified by: capnometry, equal breath sounds and chest rise        Number of attempts at approach: 1       Secured with: silk tape       Ease of procedure: easy       Dentition: Intact and Unchanged       Dental guard used and removed. Dental Guard Type: Proguard Red.    Medication(s) Administered   Medication Administration Time: 5/2/2023 7:40 AM

## 2023-05-02 NOTE — ANESTHESIA PROCEDURE NOTES
Perioperative KARISHMA Procedure Note    Staff -        Anesthesiologist:  Rebecca Cameron MD       Performed By: anesthesiologist  Preanesthesia Checklist:  Patient identified, IV assessed, risks and benefits discussed, monitors and equipment assessed, procedure being performed at surgeon's request and anesthesia consent obtained.    KARISHMA Probe Insertion    Probe Status PRE Insertion: NO obvious damage  Probe type:  Adult 3D  Bite block used:   Soft  Insertion Technique: Easy, no oropharyngeal manipulation  Insertion complications: None obvious  Billing Report:A KARISHMA report is NOT being generated.  Probe Status POST Removal: NO obvious damage

## 2023-05-02 NOTE — PROGRESS NOTES
"SPIRITUAL HEALTH SERVICES NOTE  [facility]; ICU    FULL SPIRITUAL CARE NOTE:   Brief follow-up visit with Dank. He was ventilated and nodded \"yes\" when asked if he is doing his deep breathing exercises and if they helped. I validated how uncomfortable being ventilated is and how anxiety-producing it is and offered a prayer, which Dank welcomed. Met with Dank's wife Radha and his son and daughter in the ICU lounge. They are encouraged at well surgery went and what good spirits Dank has been in.     Time Spent with Patient/Family: 15 minutes    PLAN OF CARE: Will follow-up again later this week    Evelyn Kenyon M.Div.      Office: [phone] (for non-urgent requests)  Please Vocera or page through [facility] for time-sensitive requests    "

## 2023-05-02 NOTE — ANESTHESIA CARE TRANSFER NOTE
Patient: Guille Owusu    Procedure: Procedure(s):  Mitral valve repair with anesthesia transesophageal echocardiogram, left atrial appendage excision, epiaortic ultrasound       Diagnosis: Mitral regurgitation [I34.0]  Diagnosis Additional Information: No value filed.    Anesthesia Type:   General     Note:    Oropharynx: endotracheal tube in place  Level of Consciousness: iatrogenic sedation      Independent Airway: airway patency satisfactory and stable  Dentition: dentition unchanged  Vital Signs Stable: post-procedure vital signs reviewed and stable  Report to RN Given: handoff report given  Patient transferred to: ICU  Comments: Report given to ICU nurse. VSS.  ICU Handoff: Call for PAUSE to initiate/utilize ICU HANDOFF, Identified Patient, Identified Responsible Provider, Reviewed the Pertinent Medical History, Discussed Surgical Course, Reviewed Intra-OP Anesthesia Management and Issues during Anesthesia, Set Expectations for Post Procedure Period and Allowed Opportunity for Questions and Acknowledgement of Understanding      Vitals:  Vitals Value Taken Time   /65    Temp 36.8    Pulse 90 05/02/23 1156   Resp 12    SpO2 98 % 05/02/23 1156   Vitals shown include unvalidated device data.    Electronically Signed By: LUIS Carlos CRNA  May 2, 2023  11:57 AM

## 2023-05-02 NOTE — OP NOTE
DATE OF SERVICE: 5/2/2023.      PREOPERATIVE DIAGNOSES:   1.  Severe mitral regurgitation.   2.  Posterior mitral valve prolapse.      POSTOPERATIVE DIAGNOSES:   1.  Severe mitral regurgitation.   2.  Posterior mitral valve prolapse.      PROCEDURE PERFORMED:   1.  Mitral valve repair (Posterior leaflet triangular resection and 34 mm Medtronic CG Future annuloplasty ring).   2.  Left atrial appendage excision.    SURGEON:  Aniceto Zeng MD, PhD.      RESIDENT SURGEON: Lexx Soni MD.     FIRST ASSISTANT: Jeniffer Joe, surgical first assistant.      ANESTHESIA:  General endotracheal anesthesia with a single-lumen endotracheal tube.       ESTIMATED BLOOD LOSS:  1 liter.       SPECIMENS:  Triangular portion of the P2 scallop of the posterior mitral valve leaflet.       INDICATIONS FOR PROCEDURE:  Mr. Owusu is a 63-year-old man with severe mitral regurgitation and dyspnea. Coronary angiography demonstrates no obstructive coronary artery disease. I described the technical details, as well as the expected postoperative course and recovery to the patient. I also discussed the risks and benefits of the procedure. The risks include but are not limited to bleeding, infection, stroke, heart failure, dysrhythmia, respiratory failure, kidney or liver injury, bowel or limb ischemia, and death. The calculated STS risk of mortality is 1%, and the calculated STS risk of major morbidity or mortality is <10%. The patient understands these risks and wishes to undergo the operation.       OPERATIVE FINDINGS:  The ascending aorta was free of calcified plaque. The mitral valve was somewhat myxomatous and had two torn primary chordae of the P2 scallop of the posterior mitral leaflet The posterior annulus was also moderately dilated. After repair, there was no mitral regurgitation and the mean gradient was 1 mmHg. Sinus rhythm resumed after reperfusion. Left ventricular function was normal preoperatively and unchanged after bypass  without inotropic support.      OPERATIVE DESCRIPTION IN DETAIL:  After obtaining informed consent, the patient was brought to the operating room and placed in the supine position on the operating room table.  Appropriate lines and devices for monitoring were placed by the anesthesia team.  The patient underwent smooth induction of general anesthesia, and the trachea was intubated orally with an endotracheal tube.  A timeout was performed to confirm the correct patient identity, as well as the procedure to be performed.       A median sternotomy was performed, and the pericardium was opened. After full heparinization to achieve and activated clotting time over 480 seconds, the distal ascending aorta (18 Fr. Eopa), superior vena cava (24 Fr. Metal tip right angle), and inferior vena cava  (28 Fr. Venous) were cannulated before cardiopulmonary bypass was commenced. An antegrade cardioplegia cannula and a retrograde cardioplegia cannula were placed. The cavae were snared to achieve complete cardiopulmonary bypass. The aortic cross clamp was applied across the distal ascending aorta on low flow cardiopulmonary bypass, and the heart was arrested with 1 liter of cold antegrade blood cardioplegia. Subsequent maintenance doses of cold retrograde or [id number] blood cardioplegia were given approximately every 20 minutes, and topical cold saline slush was applied for additional protection.    Left atrial appendage excision was performed and the defect was closed with running 3-0 Prolene; the first layer was running horizontal mattress and the second layer was running continuous.     Sondergaard's groove was dissected with electrocautery, and through a left atriotomy, the mitral valve was exposed with the above findings noted. Next,  2-0 Ethibond simple annular sutures were placed circumferentially. A triangular portion of the P2 scallop of the posterior mitral valve was excised. The edges of the posterior mitral leaflet that  remained after the triangular excision were then re-apposed with 4-0 Prolene in 2 layers; the first layer in running imbricating fashion, and the second layer in simple running fashion. After appropriate sizing, the annular sutures were brought through a 34 mm Spectral Edgetronic CG Future annuloplasty band. The annuloplasty band was seated in place and the sutures were secured and cut with the CoreKnot device. The motion of the valve leaflets and competence of the mitral valve was confirmed with saline. The left atriotomy was closed in two layers with 4-0 Prolene; the first layer in running horizontal mattress and the second layer in simple continuous fashion.     The heart was de-aired. With the patient in Trendelenberg position, the aortic cross clamp was released and the heart was resuscitated. Ventilation was commenced. A ventricular pacing wire was placed and brought out through a separate stab incision.  A back-up ventricular pacing wire was placed and brought out through a separate stab incision. A right atrial pacing wire was placed and brought out through a separate stab incision. The patient weaned from cardiopulmonary bypass, was given protamine and decannulated.  All bleeding points were controlled.  Topical hemostatic agents were applied. A 28-Trinidadian straight chest tube was placed in the anterior mediastinum and brought out through a separate stab incision. A 24-Trinidadian Farrukh drain was placed in the left pleural space and brought out through a separate stab incision.  The sternotomy incision was closed with 3 interrupted #6 stainless steel sternal wires in the manubrium, 3 double wires in the body of the sternum, and one additional #6 stainless steel sternal wire at the lower aspect of the sternum.  The muscle, fascia and skin were closed in layers with absorbable suture.  Dermabond was applied.  All sponge, needle and instrument counts were correct at the end of the case.        SHAY SILVERIO MD

## 2023-05-02 NOTE — CONSULTS
PULMONARY / CRITICAL CARE CONSULTATION NOTE      Consultation - Pulmonary/Critical Care Medicine  Guille Owusu,  1960, MRN [id number]  Date / Time of Admission:  2023  5:21 AM    Admitting Dx: Mitral regurgitation [I34.0]    PCP: Tony Horne, [phone]  Consulting physician:  Aniceto Zeng*   Code status:  Full Code       Extended Emergency Contact Information  Primary Emergency Contact: Radha Owusu  Mobile Phone: [phone]  Relation: Spouse       ID:  Guille Owusu is a 63 year old male with severe mitral regurgitation, heart failure, and CHELSEY. Admitted today for Mitral Valve repair with Dr. Zeng.         ASSESSMENT & PLAN BY SYSTEM   Systems to Assess:     Pulmonary: Post op vent management; no underlying lung disease. Reported CHELSEY.     Wean supplemental O2 as tolerated; goal O2 sat > 92%.  HOB > 30 degrees to limit aspiration risk.      Check ABG and adjust support as indicated; avoid acidotic state.     Check CXR    Once hemodynamically stable, plan to proceed to wake, wean, and extubate with goal < 6-hours.    Goal extubation by 1745     Consider PAP therapy post extubation    Cardiovascular: Mitral Valve Regurgitation, severe; now S/P Mitral valve repair with Ring. No underlying CAD.     Cardiac monitoring.     SBP goal > 90 mmHg, MAP goal > 65 mmHg.      Goal CI 2-3     Goal PAD upper teens    Vasoactive gtts per CV-surgery.     Temporary pacing wires present; will use in setting of symptomatic arrhythmia.     Currently paced @ back up of 50    Underlying rhythm: sinus    Neurological: sedation for vent synchrony and comfort.      Neuro checks per ICU protocol.     Sedate with precedex until ready to wean and extubate.     Pain control: PRN    Goal RASS 0 to -1     GI/: no acute issues    NPO until extubated and fully awake.     Cedillo catheter in place for accurate measurement of I/O.     GI prophylaxis:  Omeprazole    Renal: no acute issues     Monitor  "I/O's.  Electrolyte repletion PRN.  Avoid/limit nephrotoxic agents.     IVFs: per CV-surgery    Heme/Coag: Acute blood loss anemia; coagulopathy secondary to pump run.     Monitor H/H.     Transfuse per CV-surgery.     Monitor chest tube output hourly; notify CV-surgery for excessive output or abrupt stop in output.     DVT prophylaxis:  SubQ heparin    Infectious disease:     General precautions.     Remove lines and drains once no longer required.     Endocrine: hyperglycemia - stress induced     RHI gtt per ICU protocol.     Musculoskeletal:     Bedrest; initiate mobility protocol.     Lines: A-line, Day# 1, Central line, Day# 1 or [place], Day# 1      Code Status:  Full code    Thank you for this consultation.  Please call if any questions or concerns.      Clinically Significant Risk Factors Present on Admission          # Hypocalcemia: Lowest Ca = 8.3 mg/dL in last 2 days, will monitor and replace as appropriate      # Coagulation Defect: INR = 1.30 (Ref range: 0.85 - 1.15) and/or PTT = 77 Seconds (Ref range: 22 - 38 Seconds), will monitor for bleeding         # Overweight: Estimated body mass index is 28.12 kg/m  as calculated from the following:    Height as of 4/21/23: 1.778 m (5' 10\").    Weight as of this encounter: 88.9 kg (196 lb).                This patient is considered critically ill and requires ICU level of care due to immediate post CV-surgical procedure. High risk for acute hemodynamic collapse and death.     Total Critical Care time, not including separate billable procedure time:  40 minutes.    Stephenie Gonzalez, CNP  [facility] Pulmonary/Critical Care      Chief Complaint chief complaint       HPI    Guille Owusu is a 63 year old male with severe mitral regurgitation, heart failure, and dirk. Admitted today for Mitral Valve Repair and ring with Dr. Zeng. Procedure reported as straight forward. No issues with line placement or intubation. Received 20mg IV methadone. Echo " showed EF of ~50%, which was preserved post procedure. No difficulty going on or coming off pump. EBL 1000mL; transfused with 305mL Cell Saver and received a dose of DDAVP. No bleeding concerns at end of case. Arrived to ICU off vasopressors; sedated on Precedex.    Direct sign out received from Dr. Cameron at the bedside.        Review of Systems   Review of systems not obtained due to patient factors.     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     Priority: Medium     Exercise-induced bronchospasm 04/16/2023     Priority: Medium     Migraine 04/16/2023     Priority: Medium     CHELSEY (obstructive sleep apnea) 01/31/2023     Priority: Medium     Systolic murmur 01/31/2023     Priority: Medium         Medical/Surgical History   Past Medical History:   Diagnosis Date     CHELSEY (obstructive sleep apnea)      Past Surgical History:   Procedure Laterality Date     AS REVISE TOTAL HIP REPLACEMENT Left 01/03/2023     CV CORONARY ANGIOGRAM N/A 4/21/2023    Procedure: Coronary Angiogram;  Surgeon: Javier Adames MD;  Location: [facility] CV     CV LEFT HEART CATH N/A 4/21/2023    Procedure: Left Heart Catheterization;  Surgeon: Javier Adames MD;  Location: [facility] CATH LAB CV         Allergies   Allergies   Allergen Reactions     Other Food Allergy Anaphylaxis     Fruits, cherries, apples, banana - almost everything except for melons and citrus     Horse-Derived Products Other (See Comments)     Other reaction(s): facial swelling  Facial swelling           Medications:  OUTpatient medications   Prior to  Admission medications    Medication Sig Start Date End Date Taking? Authorizing Provider   albuterol (PROAIR HFA/PROVENTIL HFA/VENTOLIN HFA) 108 (90 Base) MCG/ACT inhaler Inhale 1 puff into the lungs as needed 7/31/22  Yes Reported, Patient   cholecalciferol (VITAMIN D3) 10 mcg (400 units) TABS tablet Take 50 mcg by mouth daily   Yes Reported, Patient   EPINEPHrine (ANY BX GENERIC EQUIV) 0.3 MG/0.3ML injection 2-pack 0.3 mg intramuscularly 1X as directed, 2 of the 2 pack   Yes Reported, Patient   fish oil-omega-3 fatty acids 1000 MG capsule Take 2,000 mg by mouth daily   Yes Reported, Patient   ibuprofen (ADVIL/MOTRIN) 200 MG tablet Take 200-400 mg by mouth every 6 hours as needed   Yes Reported, Patient   loratadine (CLARITIN) 10 MG tablet 1 tab(s) orally once a day   Yes Reported, Patient   multivitamin, therapeutic (THERA-VIT) TABS tablet Take 1 tablet by mouth daily   Yes Reported, Patient   rizatriptan (MAXALT) 10 MG tablet 1 tab(s) orally may repeat once in 24 hr, no sooner than 2 hr after the first for migraine, 3 month supply 12/5/22  Yes Reported, Patient   Respiratory Therapy Supplies (CARETOUCH CPAP & BIPAP HOSE) MISC (CUSTOM Rx) C-PAP MACHINE    Reported, Patient           Medications:  INpatient medications     acetaminophen  975 mg Oral or Feeding Tube Q8H     [START ON 5/3/2023] aspirin  81 mg Oral or NG Tube Daily     ceFAZolin  2 g Intravenous Q8H     chlorhexidine  15 mL Mouth/Throat Q12H     [START ON 5/3/2023] heparin ANTICOAGULANT  5,000 Units Subcutaneous Q8H     lidocaine   Transdermal Q8H TERESO     [START ON 5/3/2023] loratadine  10 mg Oral or Feeding Tube Daily     [START ON 5/3/2023] multivitamin, therapeutic  1 tablet Oral Daily     [START ON 5/3/2023] pantoprazole  40 mg Oral or NG Tube QAM AC    Or     [START ON 5/3/2023] pantoprazole  40 mg Oral QAM AC     [START ON 5/3/2023] polyethylene glycol  17 g Oral or Feeding Tube Daily     senna-docusate  1 tablet Oral or Feeding Tube BID      vancomycin  1,500 mg Intravenous Q12H     [START ON 5/3/2023] cholecalciferol  50 mcg Oral or Feeding Tube Daily           Family History  Social History     Reviewed  History reviewed. No pertinent family history.    Reviewed  Social History     Socioeconomic History     Marital status:      Spouse name: Not on file     Number of children: Not on file     Years of education: Not on file     Highest education level: Not on file   Occupational History     Not on file   Tobacco Use     Smoking status: Never     Smokeless tobacco: Never   Vaping Use     Vaping status: Never Used   Substance and Sexual Activity     Alcohol use: Yes     Comment: socially     Drug use: Never     Sexual activity: Not Currently   Other Topics Concern     Not on file   Social History Narrative     Not on file     Social Determinants of Health     Financial Resource Strain: Not on file   Food Insecurity: Not on file   Transportation Needs: Not on file   Physical Activity: Not on file   Stress: Not on file   Social Connections: Not on file   Intimate Partner Violence: Not on file   Housing Stability: Not on file      Psychosocial Needs  Social History     Social History Narrative     Not on file     Additional psychosocial needs reviewed per nursing assessment.      Physical Exam   VITALS:  /59   Pulse 89   Temp 97.3  F (36.3  C) (Core)   Resp 18   Wt 88.9 kg (196 lb)   SpO2 98%   BMI 28.12 kg/m    [unfilled]    PHYSICAL EXAM:   GEN: intubated and sedated  HEENT:  OETT in place; AT/NC  NECK: Supple.  RIJ introducer with PA-C in place  PULM:  Unlabored on full vent; lungs clear and equal. No wheeze.   CVS:   RRR S1S2 no murmur; chest tubes in place  ABDOMEN:   Soft ntnd  EXTREMITIES/SKIN:    Visible skin intact  NEURO:  sedated    I&O:      Intake/Output Summary (Last 24 hours) at 5/2/2023 1225  Last data filed at 5/2/2023 1200  Gross per 24 hour   Intake 1955 ml   Output 1127 ml   Net 828 ml        Pertinent Labs   Lab  Results: personally reviewed.     Serum Glucose range:Invalid input(s): POCGLU    Recent Labs   Lab Test 05/02/23  1213   TEMP 37.0     CBC:  Recent Labs   Lab 05/02/23  1158 05/02/23  1055 05/02/23  1052   WBC 16.2*  --  20.4*   HGB 13.7 13.7 13.6   HCT 40.9  --  40.2     --  193     Chemistry:  Recent Labs   Lab 05/02/23  1055 05/02/23  0946 05/02/23  0916    140 140     Coags:  Lab Results   Component Value Date    INR 1.30 (H) 05/02/2023    INR 1.38 (H) 05/02/2023    INR 1.03 04/25/2023     Cardiac Markers:  No results for input(s): CKTOTAL, TROPONINI in the last 168 hours.    Invalid input(s): TROPONINT, CKMBINDEX    Microbiology:    Negative MRSA PCR       Radiology:    Chest X-Ray: post op film not yet done

## 2023-05-02 NOTE — H&P
H&P Update    The patient has no new complaints and no new exam findings. The H&P is up to date. Please see the note below for details.    Aniceto Zeng MD  7:11 AM  5/2/2023    Cardiac and Thoracic Surgery Consultation      Guille Owusu MRN# [id number]   YOB: 1960 Age: 63 year old   Date of Admission:      (Not on file)     Reason for consult: I was asked by Dr. Armando Ram to evaluate this patient for severe mitral regurgitation.           Assessment and Plan:   Mr. Owusu is a 63-year-old man with severe mitral regurgitation and heart failure. Coronary angiography demonstrates no coronary artery disease I recommend mitral repair. I described the technical details, as well as the expected postoperative course and recovery to the patient. I also discussed the risks and benefits of the procedure. The risks include but are not limited to bleeding, infection, stroke, heart failure, dysrhythmia, respiratory failure, kidney or liver injury, bowel or limb ischemia, and death. The calculated STS risk of mortality is 1%, and the calculated STS risk of major morbidity or mortality is 5-10%. The patient understands these risks and wishes to undergo the operation.      In the rare event that repair is not successful, the patient would need mitral valve replacement.I discussed the option of a bioprosthetic versus a mechanical valve with the patients. The patient understands that a bioprosthetic valve would not require lifelong anticoagulation, but there is a risk of prosthetic valve degeneration. I also explained that while a mechanical valve has unlimited durability, this would require lifelong anticoagulation with Coumadin. There is also a small risk of hearing the valve click. The risk of infection is equal between the two.The patient is going to consider all of this before he makes a decision on a back-up plan.     Surgery will be scheduled in the coming weeks.     After dental clearance  is confirmed, the preoperative evaluation will be complete.              Chief Complaint:   Mr. Owusu is a 63-year-old man with severe mitral regurgitation. Coronary angiography demonstrates no coronary artery disease I recommend mitral repair.     History is obtained from the patient          History of Present Illness:   This patient is a 63 year old male who presents with dyspnea, chest tightness, and paroxysmal nocturnal dyspnea. The patient actually had a hip replacement late last year, and after that, he was noted to have a heart murmur. Echocardiography demonstrated severe mitral regurgitation with an anteriorly directed regurgitant jet, and he was referred for evaluation to Dr. Ram. KARISHMA was performed confirming posterior leaflet prolapse, and actually after that, he became symptomatic and presented to the ER.      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     He has not had syncopal or near syncopal episodes.     Patient is  and his spouse is with him at the time of our evaluation.  He has 2 children.  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                Past Medical History:      Past Medical History        Past Medical History:   Diagnosis Date     CHELSEY (obstructive sleep apnea)                   Past Surgical History:      Past Surgical History         Past Surgical History:   Procedure Laterality Date     AS REVISE TOTAL HIP REPLACEMENT Left 01/03/2023     CV CORONARY ANGIOGRAM N/A 4/21/2023     Procedure: Coronary Angiogram;  Surgeon: Javier Adames MD;  Location: [facility] CV     CV LEFT HEART CATH N/A 4/21/2023     Procedure: Left Heart Catheterization;  Surgeon: Javier Adames MD;  Location: [facility] CV                   Social History:      Social History            Tobacco Use     Smoking  status: Never     Smokeless tobacco: Never   Vaping Use     Vaping status: Never Used   Substance Use Topics     Alcohol use: Yes       Comment: socially              Family History:   Family History   No family history on file.             Immunizations:           Immunization History   Administered Date(s) Administered     COVID-19 Vaccine 18+ (Moderna) 11/04/2021     COVID-19 Vaccine Bivalent Booster 12+ (Pfizer) 12/15/2022              Allergies:            Allergies   Allergen Reactions     Other Food Allergy Anaphylaxis       Fruits, cherries, apples, banana - almost everything except for melons and citrus     Horse-Derived Products Other (See Comments)       Other reaction(s): facial swelling  Facial swelling                 Medications:          Current Outpatient Medications Ordered in Epic   Medication     albuterol (PROAIR HFA/PROVENTIL HFA/VENTOLIN HFA) 108 (90 Base) MCG/ACT inhaler     amoxicillin (AMOXIL) 500 MG capsule     cholecalciferol (VITAMIN D3) 10 mcg (400 units) TABS tablet     fish oil-omega-3 fatty acids 1000 MG capsule     ibuprofen (ADVIL/MOTRIN) 200 MG tablet     loratadine (CLARITIN) 10 MG tablet     multivitamin, therapeutic (THERA-VIT) TABS tablet     Respiratory Therapy Supplies (CARETOUCH CPAP & BIPAP HOSE) MISC     rizatriptan (MAXALT) 10 MG tablet     EPINEPHrine (ANY BX GENERIC EQUIV) 0.3 MG/0.3ML injection 2-pack     fluticasone (FLONASE) 50 MCG/ACT nasal spray      No current Epic-ordered facility-administered medications on file.              Review of Systems:      The 10 point Review of Systems is negative other than noted in the HPI            Physical Exam:   Vitals were reviewed  Constitutional:    awake, alert, cooperative, no apparent distress, and appears stated age      Eyes:    Lids and lashes normal, pupils equal, round and reactive to light, extra ocular muscles intact, sclera clear, conjunctiva normal      ENT:    normocepalic, without obvious abnormality       Neck:    supple, symmetrical, trachea midline      Lungs:    no increased work of breathing, good air exchange and no retractions      Cardiovascular:    regular rate and rhythm      Abdomen:    non-distended      Musculoskeletal:    no lower extremity pitting edema present  there is no redness, warmth, or swelling of the joints  full range of motion noted  motor strength is 5 out of 5 all extremities bilaterally      Neurologic:    Mental Status Exam:  Level of Alertness:   awake  Orientation:   person, place, time  Memory:   normal  Cranial Nerves:  cranial nerves II-XII are grossly intact  Motor Exam:  moves all extremities well and symmetrically      Neuropsychiatric:    General: normal, calm and normal eye contact  Level of consciousness: alert / normal  Affect: normal      Skin:    no bruising or bleeding, normal skin color, texture, turgor and no redness, warmth, or swelling           Data:   All laboratory data reviewed  All cardiac studies reviewed by me.  All imaging studies reviewed by me.     CARDIAC CATHETERIZATION:  Left Main   The vessel was visualized by selective angiography and is moderate in size. There was 0% vessel disease.      Left Anterior Descending   The vessel was visualized by selective angiography and is moderate in size. There was 0% vessel disease. Ramus has 30% ostial narrowing.      Left Circumflex   The vessel was visualized by selective angiography and is moderate in size. There was 0% vessel disease.      Right Coronary Artery   The vessel was visualized by selective angiography and is moderate in size. There was 0% vessel disease.               TRANSTHORACIC ECHOCARDIOGRAPHY:  Left ventricular size, wall motion and function are normal. The ejection  fraction is 60-65%.  Normal right ventricle size and systolic function.  The left atrium is moderately dilated.  There is prolapse of the posterior mitral leaflet.  There is severe (4+) mitral regurgitation.  The mitral  regurgitant jet is anteriorly directed, which is consistent with  posterior leaflet pathology.     TRANSESOPHAGEL ECHOCARDIOGRAPHY:  The mitral valve leaflets appear myxomatous.  There is prolapse of the medial and lateral scallops of the posterior leaflet.  There is a thin mobile echodensity likely consistent with a ruptured chordae  tendonae.  There is severe (4+) mitral regurgitation.  The visual ejection fraction is 60-65%.  The right ventricle is normal in size and function.  No other significant valve disease.  The left atrium is mild to moderately dilated.  No thrombus is detected in the left atrial appendage.  A contrast injection (Bubble Study) was performed that was negative for flow  across the interatrial septum.     EKG:  Sinus rhythm   Possible Left atrial enlargement   Septal infarct (cited on or before 16-APR-2023)   Abnormal ECG   When compared with ECG of 16-APR-2023 16:59,   No significant change was found

## 2023-05-02 NOTE — ANESTHESIA POSTPROCEDURE EVALUATION
Patient: Guille Owusu    Procedure: Procedure(s):  Mitral valve repair with anesthesia transesophageal echocardiogram, left atrial appendage excision, epiaortic ultrasound       Anesthesia Type:  General    Note:  Disposition: ICU            ICU Sign Out: Anesthesiologist/ICU physician sign out WAS performed   Postop Pain Control:    PONV:    Neuro/Psych:    Airway/Respiratory:             Sign Out: AIRWAY IN SITU/Resp. Support   CV/Hemodynamics:    Other NRE: NONE   DID A NON-ROUTINE EVENT OCCUR? No           Last vitals:  Vitals:    05/02/23 1215 05/02/23 1230 05/02/23 1245   BP: 100/59     Pulse: 89 90 90   Resp:      Temp:  36.4  C (97.5  F)    SpO2: 98% 97% 97%       Electronically Signed By: Rebecca Cameron MD  May 2, 2023  12:56 PM

## 2023-05-02 NOTE — BRIEF OP NOTE
[facility]    Brief Operative Note    Pre-operative diagnosis:   1. Severe mitral regurgitation from posterior P2 prolapse and ruptured chords  2. Obstructive sleep apnea    Post-operative diagnosis   1. Severe mitral regurgitation from posterior P2 prolapse and ruptured chords  2. Obstructive sleep apnea      Procedures:   1. Mitral valve repair with triangular resection and plication of P2 leaflet, placement of 34 mm CG future annuloplasty ring  2. Left atrial appendage excision  3. Placement of temporary atrial and ventricular pacing wires      Surgeon:  Aniceto Zeng MD     Fellow:  Lexx Soni MD    First assist: Jeniffer Joe    Anesthesia: General     Estimated Blood Loss: 1000 ml    Drains:  1 mediastinal and 1 left pleural drains    Specimens:   ID Type Source Tests Collected by Time Destination   1 : LEFT ATRIAL APPENDAGE Tissue Heart SURGICAL PATHOLOGY EXAM Aniceto Zeng MD 5/2/2023  9:10 AM    2 : P2 scallop  Tissue Heart Valve, Mitral (Bicuspid) SURGICAL PATHOLOGY EXAM Aniceto Zeng MD 5/2/2023  9:40 AM      Findings:   P2 prolapse with two ruptured primary chords. Triangular resection and plication with excellent result. Walton gradient after repair 2 mm Hg..    Complications: None.    Implants:   Implant Name Type Inv. Item Serial No.  Lot No. LRB No. Used Action   FANG STERNAL WIRE SINGLE #6 046-032 - [id number] Wire FANG STERNAL WIRE SINGLE #6 046-032  [employer] [id number] N/A 1 Implanted   FANG MYOSTERNAL WIRE 046-267 - [id number] Wire FANG MYOSTERNAL [employer]IRE 046-267  [employer] [id number] N/A 1 Implanted   ANNULOPLASTY RING CASSIE FUTURE 34MM [id number] - [id number] Annuloplasty Ring ANNULOPLASTY RING CASSIE FUTURE 34MM [id number] [employer]  N/A 1 Implanted       Cardiopulmonary bypass: 99 minutes    Aortic cross clamp time: 72 minutes      Lexx Soni MD  Cardiothoracic Surgery Fellow  Pager: [phone]

## 2023-05-02 NOTE — PLAN OF CARE
Problem: Mechanical Ventilation Invasive  Goal: Effective Communication  5/2/2023 1827 by Linh Tolbert, RT  Outcome: Met  5/2/2023 1635 by Linh Tolbert RT  Outcome: Progressing  Goal: Mechanical Ventilation Liberation  5/2/2023 1827 by Linh Tolbert, RT  Outcome: Met  5/2/2023 1635 by Linh Tolbert, RT  Outcome: Progressing  Goal: Absence of Device-Related Skin and Tissue Injury  5/2/2023 1827 by Linh Tolbert, RT  Outcome: Met  5/2/2023 1635 by Linh Tolbert, RT  Outcome: Progressing  Goal: Absence of Ventilator-Induced Lung Injury  5/2/2023 1827 by Linh Tolbert, RT  Outcome: Met  5/2/2023 1635 by Linh Tolbert, RT  Outcome: Progressing   Goal Outcome Evaluation:    Patient admitted to ICU s/p MVR and placed on full mechanical ventilator support with the following settings: AC/18/600/+5/80%. ABG results WNL and CXR confirmed ETT placement. FiO2 weaned down to 30%. First SBT via PS 5/+5 attempt started at 1400. SBT x2 failed due to apnea. Multiple brief apnea spells noted while on the final SBT of PS 5/+5. Patient was extubated at 1740 and placed on BiPAP with the settings below. Cuff leak was present. No post extubation stridor. BBS clear. Minimal secretions. Patient reports he has CHELSEY and uses a home CPAP unit, which is clean and at the bedside. Patient plans to have another sleep study done post hospital stay.      05/02/23 1740   AVAPS Settings   Min P (cmH20) AVAPS 10   Max P (cmH20) AVAPS 30   EPAP cmH20 Bilevel (Res Med) 5   Target VT (mL) AVAPS 500   Oxygen (%) 30   Set Rate (breath/min) 16 breath/min   Timed Inspiration (Sec) 0.9   Rise Time 3     Linh Tolbert, RT

## 2023-05-02 NOTE — ANESTHESIA PROCEDURE NOTES
Central Line/PA Catheter Placement    Pre-Procedure   Staff -        Anesthesiologist:  Rebecca Cameron MD       Performed By: anesthesiologist       Location: OR       Pre-Anesthestic Checklist: patient identified, IV checked, site marked, risks and benefits discussed, informed consent, monitors and equipment checked, pre-op evaluation and at physician/surgeon's request  Timeout:       Correct Patient: Yes        Correct Procedure: Yes        Correct Site: Yes        Correct Position: Yes        Correct Laterality: Yes   Line Placement:   This line was placed Post Induction starting at 5/2/2023 7:45 AM and ending at 5/2/2023 8:05 AM    Procedure   Procedure: central line       Laterality: right       Insertion Site: internal jugular.       Patient Position: supine  Sterile Prep        All elements of maximal sterile barrier technique followed       Patient Prep/Sterile Barriers: draped, hand hygiene, gloves , hat , mask , draped, gown, sterile gel and probe cover       Skin prep: Chloraprep  Insertion/Injection        Technique: ultrasound guided and Seldinger Technique        1. Ultrasound was used to evaluate the access site.       2. Vein evaluated via ultrasound for patency/adequacy.       3. Using real-time ultrasound the needle/catheter was observed entering the artery/vein.       4. Permanent image was captured and entered into the patient's record.       Introducer Type: 9 Fr, 2-lumen MAC        Type: PA/CVC with Introducer       PA Catheter Type: CCO         Appropriate RV, RA and PA waveforms noted:  Yes            Balloon down at end of the procedure:   Narrative         Secured by: suture       Tegaderm and Biopatch dressing used.       Complications: None apparent,        blood aspirated from all lumens,        Verification method: Ultrasound

## 2023-05-03 ENCOUNTER — DOCUMENTATION ONLY (OUTPATIENT)
Dept: OTHER | Facility: CLINIC | Age: 63
End: 2023-05-03

## 2023-05-03 ENCOUNTER — APPOINTMENT (OUTPATIENT)
Dept: OCCUPATIONAL THERAPY | Facility: HOSPITAL | Age: 63
DRG: 219 | End: 2023-05-03
Attending: SURGERY
Payer: COMMERCIAL

## 2023-05-03 ENCOUNTER — APPOINTMENT (OUTPATIENT)
Dept: RADIOLOGY | Facility: HOSPITAL | Age: 63
DRG: 219 | End: 2023-05-03
Attending: PHYSICIAN ASSISTANT
Payer: COMMERCIAL

## 2023-05-03 LAB
ALBUMIN SERPL BCG-MCNC: 3.5 G/DL (ref 3.5–5.2)
ALP SERPL-CCNC: 41 U/L (ref 40–129)
ALT SERPL W P-5'-P-CCNC: 14 U/L (ref 10–50)
ANION GAP SERPL CALCULATED.3IONS-SCNC: 10 MMOL/L (ref 7–15)
AST SERPL W P-5'-P-CCNC: 54 U/L (ref 10–50)
ATRIAL RATE - MUSE: 86 BPM
BASE EXCESS BLDV CALC-SCNC: 0.3 MMOL/L
BILIRUB SERPL-MCNC: 0.7 MG/DL
BUN SERPL-MCNC: 20.1 MG/DL (ref 8–23)
CALCIUM SERPL-MCNC: 8.3 MG/DL (ref 8.8–10.2)
CALCIUM, IONIZED MEASURED: 1.08 MMOL/L (ref 1.11–1.3)
CALCIUM, IONIZED MEASURED: 1.12 MMOL/L (ref 1.11–1.3)
CALCIUM, IONIZED MEASURED: 1.14 MMOL/L (ref 1.11–1.3)
CHLORIDE SERPL-SCNC: 108 MMOL/L (ref 98–107)
CREAT SERPL-MCNC: 1.01 MG/DL (ref 0.67–1.17)
DEPRECATED HCO3 PLAS-SCNC: 22 MMOL/L (ref 22–29)
DIASTOLIC BLOOD PRESSURE - MUSE: NORMAL MMHG
ERYTHROCYTE [DISTWIDTH] IN BLOOD BY AUTOMATED COUNT: 13.5 % (ref 10–15)
GFR SERPL CREATININE-BSD FRML MDRD: 84 ML/MIN/1.73M2
GLUCOSE BLDC GLUCOMTR-MCNC: 129 MG/DL (ref 70–99)
GLUCOSE BLDC GLUCOMTR-MCNC: 137 MG/DL (ref 70–99)
GLUCOSE BLDC GLUCOMTR-MCNC: 138 MG/DL (ref 70–99)
GLUCOSE BLDC GLUCOMTR-MCNC: 139 MG/DL (ref 70–99)
GLUCOSE BLDC GLUCOMTR-MCNC: 141 MG/DL (ref 70–99)
GLUCOSE BLDC GLUCOMTR-MCNC: 143 MG/DL (ref 70–99)
GLUCOSE BLDC GLUCOMTR-MCNC: 143 MG/DL (ref 70–99)
GLUCOSE BLDC GLUCOMTR-MCNC: 145 MG/DL (ref 70–99)
GLUCOSE BLDC GLUCOMTR-MCNC: 148 MG/DL (ref 70–99)
GLUCOSE BLDC GLUCOMTR-MCNC: 151 MG/DL (ref 70–99)
GLUCOSE BLDC GLUCOMTR-MCNC: 151 MG/DL (ref 70–99)
GLUCOSE SERPL-MCNC: 154 MG/DL (ref 70–99)
HCO3 BLDV-SCNC: 26 MMOL/L (ref 24–30)
HCT VFR BLD AUTO: 35.7 % (ref 40–53)
HGB BLD-MCNC: 12.2 G/DL (ref 13.3–17.7)
INTERPRETATION ECG - MUSE: NORMAL
ION CA PH 7.4: 1.08 MMOL/L (ref 1.11–1.3)
ION CA PH 7.4: 1.08 MMOL/L (ref 1.11–1.3)
ION CA PH 7.4: 1.09 MMOL/L (ref 1.11–1.3)
MAGNESIUM SERPL-MCNC: 2.1 MG/DL (ref 1.7–2.3)
MCH RBC QN AUTO: 32 PG (ref 26.5–33)
MCHC RBC AUTO-ENTMCNC: 34.2 G/DL (ref 31.5–36.5)
MCV RBC AUTO: 94 FL (ref 78–100)
OXYHGB MFR BLDV: 57.9 % (ref 70–75)
P AXIS - MUSE: 51 DEGREES
PATH REPORT.COMMENTS IMP SPEC: NORMAL
PATH REPORT.COMMENTS IMP SPEC: NORMAL
PATH REPORT.FINAL DX SPEC: NORMAL
PATH REPORT.GROSS SPEC: NORMAL
PATH REPORT.MICROSCOPIC SPEC OTHER STN: NORMAL
PATH REPORT.RELEVANT HX SPEC: NORMAL
PCO2 BLDV: 46 MM HG (ref 35–50)
PH BLDV: 7.36 [PH] (ref 7.35–7.45)
PH: 7.32 (ref 7.35–7.45)
PH: 7.33 (ref 7.35–7.45)
PH: 7.39 (ref 7.35–7.45)
PHOSPHATE SERPL-MCNC: 4.1 MG/DL (ref 2.5–4.5)
PHOTO IMAGE: NORMAL
PLATELET # BLD AUTO: 153 10E3/UL (ref 150–450)
PO2 BLDV: 31 MM HG (ref 25–47)
POTASSIUM SERPL-SCNC: 4.5 MMOL/L (ref 3.4–5.3)
PR INTERVAL - MUSE: 140 MS
PROT SERPL-MCNC: 5.7 G/DL (ref 6.4–8.3)
QRS DURATION - MUSE: 80 MS
QT - MUSE: 356 MS
QTC - MUSE: 426 MS
R AXIS - MUSE: -4 DEGREES
RBC # BLD AUTO: 3.81 10E6/UL (ref 4.4–5.9)
SAO2 % BLDV: 58.8 % (ref 70–75)
SODIUM SERPL-SCNC: 140 MMOL/L (ref 136–145)
SYSTOLIC BLOOD PRESSURE - MUSE: NORMAL MMHG
T AXIS - MUSE: 21 DEGREES
VENTRICULAR RATE- MUSE: 86 BPM
WBC # BLD AUTO: 14.4 10E3/UL (ref 4–11)

## 2023-05-03 PROCEDURE — 93010 ELECTROCARDIOGRAM REPORT: CPT | Performed by: INTERNAL MEDICINE

## 2023-05-03 PROCEDURE — 210N000001 HC R&B IMCU HEART CARE

## 2023-05-03 PROCEDURE — 97110 THERAPEUTIC EXERCISES: CPT | Mod: GO

## 2023-05-03 PROCEDURE — 80053 COMPREHEN METABOLIC PANEL: CPT | Performed by: SURGERY

## 2023-05-03 PROCEDURE — 250N000012 HC RX MED GY IP 250 OP 636 PS 637: Performed by: PHYSICIAN ASSISTANT

## 2023-05-03 PROCEDURE — 82330 ASSAY OF CALCIUM: CPT | Performed by: PHYSICIAN ASSISTANT

## 2023-05-03 PROCEDURE — 250N000011 HC RX IP 250 OP 636: Performed by: PHYSICIAN ASSISTANT

## 2023-05-03 PROCEDURE — 36415 COLL VENOUS BLD VENIPUNCTURE: CPT | Performed by: PHYSICIAN ASSISTANT

## 2023-05-03 PROCEDURE — 88305 TISSUE EXAM BY PATHOLOGIST: CPT | Mod: 26 | Performed by: PATHOLOGY

## 2023-05-03 PROCEDURE — 82805 BLOOD GASES W/O2 SATURATION: CPT | Performed by: SURGERY

## 2023-05-03 PROCEDURE — 82330 ASSAY OF CALCIUM: CPT | Performed by: SURGERY

## 2023-05-03 PROCEDURE — 71045 X-RAY EXAM CHEST 1 VIEW: CPT

## 2023-05-03 PROCEDURE — 93005 ELECTROCARDIOGRAM TRACING: CPT

## 2023-05-03 PROCEDURE — 97535 SELF CARE MNGMENT TRAINING: CPT | Mod: GO

## 2023-05-03 PROCEDURE — 85027 COMPLETE CBC AUTOMATED: CPT | Performed by: PHYSICIAN ASSISTANT

## 2023-05-03 PROCEDURE — 36415 COLL VENOUS BLD VENIPUNCTURE: CPT | Performed by: SURGERY

## 2023-05-03 PROCEDURE — 258N000003 HC RX IP 258 OP 636: Performed by: PHYSICIAN ASSISTANT

## 2023-05-03 PROCEDURE — 84100 ASSAY OF PHOSPHORUS: CPT | Performed by: PHYSICIAN ASSISTANT

## 2023-05-03 PROCEDURE — 999N000157 HC STATISTIC RCP TIME EA 10 MIN

## 2023-05-03 PROCEDURE — 83735 ASSAY OF MAGNESIUM: CPT | Performed by: PHYSICIAN ASSISTANT

## 2023-05-03 PROCEDURE — 250N000013 HC RX MED GY IP 250 OP 250 PS 637: Performed by: PHYSICIAN ASSISTANT

## 2023-05-03 PROCEDURE — 97166 OT EVAL MOD COMPLEX 45 MIN: CPT | Mod: GO

## 2023-05-03 RX ORDER — KETOROLAC TROMETHAMINE 30 MG/ML
30 INJECTION, SOLUTION INTRAMUSCULAR; INTRAVENOUS EVERY 6 HOURS PRN
Status: DISCONTINUED | OUTPATIENT
Start: 2023-05-03 | End: 2023-05-04

## 2023-05-03 RX ORDER — NICOTINE POLACRILEX 4 MG
15-30 LOZENGE BUCCAL
Status: DISCONTINUED | OUTPATIENT
Start: 2023-05-03 | End: 2023-05-06 | Stop reason: HOSPADM

## 2023-05-03 RX ORDER — FUROSEMIDE 10 MG/ML
20 INJECTION INTRAMUSCULAR; INTRAVENOUS 3 TIMES DAILY
Status: DISCONTINUED | OUTPATIENT
Start: 2023-05-03 | End: 2023-05-06 | Stop reason: HOSPADM

## 2023-05-03 RX ORDER — DEXTROSE MONOHYDRATE 25 G/50ML
25-50 INJECTION, SOLUTION INTRAVENOUS
Status: DISCONTINUED | OUTPATIENT
Start: 2023-05-03 | End: 2023-05-06 | Stop reason: HOSPADM

## 2023-05-03 RX ADMIN — SENNOSIDES AND DOCUSATE SODIUM 1 TABLET: 50; 8.6 TABLET ORAL at 08:04

## 2023-05-03 RX ADMIN — HEPARIN SODIUM 5000 UNITS: 10000 INJECTION, SOLUTION INTRAVENOUS; SUBCUTANEOUS at 21:12

## 2023-05-03 RX ADMIN — OXYCODONE HYDROCHLORIDE 5 MG: 5 TABLET ORAL at 13:35

## 2023-05-03 RX ADMIN — POLYETHYLENE GLYCOL 3350 17 G: 17 POWDER, FOR SOLUTION ORAL at 08:07

## 2023-05-03 RX ADMIN — PROCHLORPERAZINE EDISYLATE 10 MG: 5 INJECTION, SOLUTION INTRAMUSCULAR; INTRAVENOUS at 04:51

## 2023-05-03 RX ADMIN — CEFAZOLIN SODIUM 2 G: 2 INJECTION, SOLUTION INTRAVENOUS at 08:08

## 2023-05-03 RX ADMIN — FUROSEMIDE 20 MG: 10 INJECTION, SOLUTION INTRAMUSCULAR; INTRAVENOUS at 11:57

## 2023-05-03 RX ADMIN — ONDANSETRON 4 MG: 2 INJECTION INTRAMUSCULAR; INTRAVENOUS at 04:17

## 2023-05-03 RX ADMIN — ASPIRIN 81 MG 81 MG: 81 TABLET ORAL at 08:07

## 2023-05-03 RX ADMIN — HYDROMORPHONE HYDROCHLORIDE 0.2 MG: 0.2 INJECTION, SOLUTION INTRAMUSCULAR; INTRAVENOUS; SUBCUTANEOUS at 04:48

## 2023-05-03 RX ADMIN — OXYCODONE HYDROCHLORIDE 10 MG: 5 TABLET ORAL at 21:11

## 2023-05-03 RX ADMIN — CALCIUM GLUCONATE 1 G: 20 INJECTION, SOLUTION INTRAVENOUS at 04:54

## 2023-05-03 RX ADMIN — PROCHLORPERAZINE EDISYLATE 10 MG: 5 INJECTION, SOLUTION INTRAMUSCULAR; INTRAVENOUS at 12:00

## 2023-05-03 RX ADMIN — PANTOPRAZOLE SODIUM 40 MG: 40 TABLET, DELAYED RELEASE ORAL at 06:37

## 2023-05-03 RX ADMIN — VANCOMYCIN HYDROCHLORIDE 1500 MG: 5 INJECTION, POWDER, LYOPHILIZED, FOR SOLUTION INTRAVENOUS at 06:55

## 2023-05-03 RX ADMIN — LIDOCAINE 2 PATCH: 4 PATCH TOPICAL at 13:36

## 2023-05-03 RX ADMIN — KETOROLAC TROMETHAMINE 30 MG: 30 INJECTION, SOLUTION INTRAMUSCULAR; INTRAVENOUS at 08:12

## 2023-05-03 RX ADMIN — OXYCODONE HYDROCHLORIDE 5 MG: 5 TABLET ORAL at 03:11

## 2023-05-03 RX ADMIN — THERA TABS 1 TABLET: TAB at 08:04

## 2023-05-03 RX ADMIN — KETOROLAC TROMETHAMINE 30 MG: 30 INJECTION, SOLUTION INTRAMUSCULAR; INTRAVENOUS at 18:34

## 2023-05-03 RX ADMIN — ACETAMINOPHEN 975 MG: 325 TABLET ORAL at 21:11

## 2023-05-03 RX ADMIN — ONDANSETRON 4 MG: 2 INJECTION INTRAMUSCULAR; INTRAVENOUS at 11:07

## 2023-05-03 RX ADMIN — METOPROLOL TARTRATE 12.5 MG: 25 TABLET, FILM COATED ORAL at 20:10

## 2023-05-03 RX ADMIN — OXYCODONE HYDROCHLORIDE 5 MG: 5 TABLET ORAL at 10:47

## 2023-05-03 RX ADMIN — FUROSEMIDE 20 MG: 10 INJECTION, SOLUTION INTRAMUSCULAR; INTRAVENOUS at 08:09

## 2023-05-03 RX ADMIN — INSULIN ASPART 1 UNITS: 100 INJECTION, SOLUTION INTRAVENOUS; SUBCUTANEOUS at 11:47

## 2023-05-03 RX ADMIN — LORATADINE 10 MG: 10 TABLET ORAL at 08:07

## 2023-05-03 RX ADMIN — SENNOSIDES AND DOCUSATE SODIUM 1 TABLET: 50; 8.6 TABLET ORAL at 20:10

## 2023-05-03 RX ADMIN — ACETAMINOPHEN 975 MG: 325 TABLET ORAL at 06:37

## 2023-05-03 RX ADMIN — ACETAMINOPHEN 975 MG: 325 TABLET ORAL at 13:35

## 2023-05-03 RX ADMIN — HEPARIN SODIUM 5000 UNITS: 10000 INJECTION, SOLUTION INTRAVENOUS; SUBCUTANEOUS at 13:38

## 2023-05-03 RX ADMIN — Medication 50 MCG: at 08:07

## 2023-05-03 RX ADMIN — FUROSEMIDE 20 MG: 10 INJECTION, SOLUTION INTRAMUSCULAR; INTRAVENOUS at 18:33

## 2023-05-03 ASSESSMENT — ACTIVITIES OF DAILY LIVING (ADL)
ADLS_ACUITY_SCORE: 22
ADLS_ACUITY_SCORE: 28
DEPENDENT_IADLS:: INDEPENDENT
ADLS_ACUITY_SCORE: 22
ADLS_ACUITY_SCORE: 28
ADLS_ACUITY_SCORE: 22
ADLS_ACUITY_SCORE: 28
ADLS_ACUITY_SCORE: 22
ADLS_ACUITY_SCORE: 22
ADLS_ACUITY_SCORE: 28
ADLS_ACUITY_SCORE: 22

## 2023-05-03 NOTE — PROVIDER NOTIFICATION
05/02/23 2054   RCAT Assessment   Reason for Assessment Thoracic Surgery   Pulmonary Status 0   Surgical Status 3   Chest X-ray 3   Respiratory Pattern 0   Mental Status 0   Breath Sounds 0   Cough Effectiveness 0   Level of Activity 2   O2 Required for SpO2>=92% 1   Acuity Level (points) 9   Acuity Level  4   Re-eval Interval Guideline Daily   Re-evaluation Date 05/03/23   Breath Sounds   Breath Sounds All Fields   All Lung Fields Breath Sounds clear   Pt is on 3 lpm 02 sating 96% RR 18. Breath sounds clear. Pt is reaching 2000 ml with IS and does well with Areobika set @ level 5. Pt uses cpap at home, but will use our V60 here tonight. Re-eval pt 5/3/21

## 2023-05-03 NOTE — PROGRESS NOTES
SPIRITUAL HEALTH SERVICES NOTE  [facility]; ICU    FULL SPIRITUAL CARE NOTE:   Brief visit with Dank. He was sitting up in his bedside chair and was dozing. Dank engaged easily in conversation, but was feeling very tired and had difficulty keeping his eyes open. He is encouraged by the progress he has made today, including two PT sessions. He is hoping to get his catheter out tomorrow. He expressed appreciation for checking in on him.     Time Spent with Patient/Family: 10 minutes    PLAN OF CARE: Will follow-up with Dank/Radha again tomorrow    Evelyn Kenyon M.Div.      Office: [phone] (for non-urgent requests)  Please Vocera or page through [facility] for time-sensitive requests

## 2023-05-03 NOTE — PROGRESS NOTES
Family education completed:Yes    Report given to: HOLGER Kim    Time of transfer:1500    Transferred to:323  Belongings sent:Yes    Family updated:Yes    Reviewed pertinent information from EPIC (EMAR/Clinical Summary/Flowsheets):Yes    Head-to-toe assessment with receiving RN:No    Recommendations (e.g. Family needs/recent issues/things to watch for): Nausea and pain

## 2023-05-03 NOTE — PROGRESS NOTES
Pulmonary/Critical Care  5/3/2023  7:59 AM     Chart reviewed.   Stable night in ICU.   Can use home CPAP once available; until then, continue AVAPs with sleep.     Our service will sign off.     Stephenie Gonzalez CNP  [facility] Pulmonary/Critical Care

## 2023-05-03 NOTE — PLAN OF CARE
Goal Outcome Evaluation:      Plan of Care Reviewed With: patient, spouse    Overall Patient Progress: improvingOverall Patient Progress: improving    Outcome Evaluation: Updated on POC    Pt had issues with nausea and was given zofran and compazine with relief. Pain is controlled with oxy 5mg and Toradol IV. Incision is red and bruised. Up to chair for all meals and walked with cardiac rehab.

## 2023-05-03 NOTE — PROGRESS NOTES
RESPIRATORY CARE NOTE     Patient was on 2 lpm and had an Sp02 of 95%.  Breath sounds are clear.     Patient is enthusiastically using his aerobika and IS and uses good technique. Achieves 2000ml on the IS.     Patient is using home cpap unit at this time. He is going to schedule an out patient sleep study post discharge.     RT will continue to assess and monitor cardiopulmonary status.     Odilia Sears, RT

## 2023-05-03 NOTE — PROGRESS NOTES
Cardiac Rehab     05/03/23 0935   Appointment Info   Signing Clinician's Name / Credentials (OT) Angelina Mendosa OTR/L   Living Environment   People in Home spouse   Current Living Arrangements house   Home Accessibility stairs to enter home;stairs within home   Number of Stairs, Main Entrance 2   Stair Railings, Main Entrance none   Number of Stairs, Within Home, Primary greater than 10 stairs  (15)   Stair Railings, Within Home, Primary none;railings safe and in good condition   Transportation Anticipated family or friend will provide   Living Environment Comments W/I shower   Self-Care   Usual Activity Tolerance good   Current Activity Tolerance fair   Regular Exercise No   Equipment Currently Used at Home none   Fall history within last six months no   Activity/Exercise/Self-Care Comment Ind. w/ ADL routine   General Information   Onset of Illness/Injury or Date of Surgery 05/02/23   Referring Physician Aniceto Zeng MD   Additional Occupational Profile Info/Pertinent History of Current Problem s/p Mitral valve repair   Existing Precautions/Restrictions cardiac;sternal;oxygen therapy device and L/min  (2L)   Cognitive Status Examination   Orientation Status orientation to person, place and time   Transfers   Transfers sit-stand transfer   Sit-Stand Transfer   Sit-Stand [place] (Transfers) contact guard;verbal cues   Assistive Device (Sit-Stand Transfers) walker, front-wheeled   Sit/Stand Transfer Comments did not attempt stairs today- pt able to demo ability to march in place   Activities of Daily Living   BADL Assessment/Intervention lower body dressing   Lower Body Dressing Assessment/Training   Comment, (Lower Body Dressing) w/ sternal precautions pt will need assist for LB dressing, toileting   [place] Level (Lower Body Dressing) maximum assist (25% patient effort)   Clinical Impression   Criteria for Skilled Therapeutic Interventions Met (OT) Yes, treatment indicated   OT Diagnosis  decreased ADL ind/endurance   OT Problem List-Impairments impacting ADL problems related to;activity tolerance impaired;strength;post-surgical precautions   Assessment of Occupational Performance 3-5 Performance Deficits   Planned Therapy Interventions (OT) ADL retraining;progressive activity/exercise;home program guidelines   Clinical Decision Making Complexity (OT) moderate complexity   Risk & Benefits of therapy have been explained evaluation/treatment results reviewed;patient;care plan/treatment goals reviewed;spouse/significant other   OT Total Evaluation Time   OT Eval, Moderate Complexity Minutes (88894) 10   OT Goals   Therapy Frequency (OT) 2 times/day   OT Predicted Duration/Target Date for Goal Attainment 05/09/23   OT Goals Cardiac Phase 1   OT: Understanding of cardiac education to maximize quality of life, condition management, and health outcomes Patient;Verbalize;Demonstrate   OT: Perform aerobic activity with stable cardiovascular response continuous;15 minutes   OT: Functional/aerobic ambulation tolerance with stable cardiovascular response in order to return to home and community environment Modified independent;Greater than 300 feet   OT: Navigation of stairs simulating home set up with stable cardiovascular response in order to return to home and community environment Modified independent;Greater than 10 stairs   Self-Care/Home Management   Self-Care/Home Mgmt/ADL, Compensatory, Meal Prep Minutes (58873) 8   Treatment Detail/Skilled Intervention Pt educated on precautions following surgery, pt completed STS trnf x3 w/ CGA w/ 4ww once upright maintaining sternal precautions throughout cues for hand placement- Pt required Max.A doff/don socks- pt up in recliner at end of session   Therapeutic Procedures/Exercise   Therapeutic Procedure: strength, endurance, ROM, flexibillity minutes (28465) 10   Symptoms Noted During/After Treatment fatigue   Treatment Detail/Skilled Intervention see YVETTE mccarty for  details/ambulation   Ambulation   Workload 5ft   Exercise Details CGA w/ 4ww, 2L of O2 pt reported feeling dizzy sat back in recliner after ambulating ~5ft   Vital Signs Details pre act. HR 78 BP 98/57 post HR 89 /58   Calisthenics   Type Step Forward;Hip Flexor: kicks;Hip Abductors;March in place;Knee Flexion;Knee Bends  (Hip adduction, ankle pumps)   Symptoms Denies symptoms   Cardiovascular Response Normal   Exercise Details all exercises x 10reps   Vital Signs Details WNL   Cardiac Education   Education Provided Precautions   Cardiac Rehab Phase II Plan   Phase II Order Received Yes   Phase II Appointment Status Scheduled   Date/Time 5/15/23   Location [facility]   OT Discharge Planning   OT Plan ambulation w/ device   OT Discharge Recommendation (DC Rec) home with outpatient cardiac rehab   OT Rationale for DC Rec Pt ambulating short distance w/ CGA w/ 4ww 2L of O2, pt lives in home w/ spouse spouse able to assist w/ all ADL/IADL tasks upon d/c per spouse report. expect pt to progress enough for safe d/c home when medically ready.   OT Brief overview of current status CGA 4ww, chest tubes/catheter, 2L O2   Total Session Time   Timed Code Treatment Minutes 18   Total Session Time (sum of timed and untimed services) 28

## 2023-05-03 NOTE — CONSULTS
Care Management Initial Consult    General Information  Assessment completed with: Patient, Family, Patient and wife Radha  Type of CM/SW Visit: Initial Assessment    Primary Care Provider verified and updated as needed:   Yes  Readmission within the last 30 days: planned readmission   Return Category: Planned Surgery  Reason for Consult: discharge planning  Advance Care Planning: Advance Care Planning Reviewed: present on chart          Communication Assessment  Patient's communication style: spoken language (English or Bilingual)    Hearing Difficulty or Deaf: no   Wear Glasses or Blind: yes    Cognitive  Cognitive/Neuro/Behavioral: WDL  Level of Consciousness: alert     Orientation: oriented x 4  Mood/Behavior: calm, cooperative  Best Language: 0 - No aphasia  Speech: clear, spontaneous, logical    Living Environment:   People in home: spouse  Radha  Current living Arrangements: house      Able to return to prior arrangements: yes       Family/Social Support:  Care provided by: self, spouse/significant other  Provides care for: no one  Marital Status:   Wife  Radha       Description of Support System: Supportive    Support Assessment: Adequate family and caregiver support    Current Resources:   Patient receiving home care services: No     Community Resources: None  Equipment currently used at home: none  Supplies currently used at home: None    Employment/Financial:  Employment Status: employed full-time        Financial Concerns:     Referral to Financial Worker: No       Lifestyle & Psychosocial Needs:  Social Determinants of Health     Tobacco Use: Low Risk  (5/2/2023)    Patient History      Smoking Tobacco Use: Never      Smokeless Tobacco Use: Never      Passive Exposure: Not on file   Alcohol Use: Not on file   Financial Resource Strain: Not on file   Food Insecurity: Not on file   Transportation Needs: Not on file   Physical Activity: Not on file   Stress: Not on file   Social Connections: Not on  file   Intimate Partner Violence: Not on file   Depression: Not on file   Housing Stability: Not on file       Functional Status:  Prior to admission patient needed assistance:   Dependent ADLs:: Independent  Dependent IADLs:: Independent  Assesssment of Functional Status: At functional baseline    Mental Health Status:  Mental Health Status: No Current Concerns             Additional Information:  RNCM met with patient and patient's wife Radha in patient's room.  Explained role.  Patient is independent and lives at home with spouse.  No services.  No DME.  Denies any other concerns.  Wife, Radha, to transport home at time of discharge.   POC: WifeRadha. [phone]    CM will continue to monitor progression of care, review team recommendations and provide discharge planning assist as needed.    Stacie Khan RN

## 2023-05-03 NOTE — PLAN OF CARE
[facility] - ICU    RN Progress Note:            Pertinent Assessments:      Please refer to flowsheet rows for full assessment     NSR. A&Ox4. On Bipap overnight for CHELSEY. Tolerated well. On 2-3L NC for breaks. LS diminished. Patient motivated and using IS and flutter valve. Tolerating CLD. BS -. flatus -. Endorsed nausea overnight. PRNs given. Dangled and stood at bedside around 2130 and OOB to chair this AM. Endorsed 4-6/10 incisional chest pain. Per patient pain tolerable at times with tylenol and oxycodone.            Key Events - This Shift:       Patient weaned from pressors. Lines pulled per orders.                Barriers to Discharge / Downgrade:     None.          Point of Contact Update YES-OR-NO: Yes  Name: Radha  Phone Number:[phone]  Summary of Conversation: Radha called around 2200. Updated spouse that patient was continuing to progress and just stood at the bedside. Made a plan with Radha to call if anything changes overnight.

## 2023-05-03 NOTE — PROGRESS NOTES
CVTS Daily Progress Note   POD#1 s/p mitral valve repair / LAAE  Attending: Karri  LOS: 1    SUBJECTIVE/INTERVAL EVENTS:    Patient arrived to ICU from OR yesterday afternoon. He was subsequently extubated and weaned from pressors; normotensive. No acute events overnight. Patient progressing well. Maintaining oxygen saturations on nasal cannula/BiPAP at night. Up to chair this AM. Pain well controlled. - BM / flatus. Tolerating diet with minimal nausea. UOP adequate. Chest tube output appropriate. Hgb 12.2. Patient denies new chest pain, shortness of breath, abdominal pain, calf pain, nausea. Patient has no questions today.    OBJECTIVE:  Temp:  [97.3  F (36.3  C)-100.2  F (37.9  C)] 99.3  F (37.4  C)  Pulse:  [79-99] 82  Resp:  [13-22] 14  BP: ()/(58-68) 102/66  MAP:  [59 mmHg-99 mmHg] 68 mmHg  Arterial Line BP: ()/(48-80) 97/56  FiO2 (%):  [30 %-80 %] 30 %  SpO2:  [90 %-100 %] 93 %  Vent Mode: (S) CPAP/PS  (Continuous positive airway pressure with Pressure Support)  FiO2 (%): 30 %  Resp Rate (Set): 18 breaths/min  Tidal Volume (Set, mL): 600 mL  PEEP (cm H2O): 5 cmH2O  Pressure Support (cm H2O): 5 cmH2O  Resp: 14    Vitals:    05/02/23 0533 05/03/23 0645   Weight: 88.9 kg (196 lb) 92.3 kg (203 lb 6.4 oz)       Current Medications:    Scheduled Meds:    acetaminophen  975 mg Oral or Feeding Tube Q8H     aspirin  81 mg Oral or NG Tube Daily     ceFAZolin  2 g Intravenous Q8H     furosemide  20 mg Intravenous TID     heparin ANTICOAGULANT  5,000 Units Subcutaneous Q8H     insulin aspart  1-7 Units Subcutaneous TID AC     insulin aspart  1-5 Units Subcutaneous At Bedtime     lidocaine   Transdermal Q8H TERESO     loratadine  10 mg Oral or Feeding Tube Daily     metoprolol tartrate  12.5 mg Oral BID     multivitamin, therapeutic  1 tablet Oral Daily     pantoprazole  40 mg Oral or NG Tube QAM AC    Or     pantoprazole  40 mg Oral QAM AC     polyethylene glycol  17 g Oral or Feeding Tube Daily      senna-docusate  1 tablet Oral or Feeding Tube BID     vancomycin  1,500 mg Intravenous Q12H     cholecalciferol  50 mcg Oral or Feeding Tube Daily     Continuous Infusions:  PRN Meds:.[START ON 5/12/2023] acetaminophen, albuterol, bisacodyl, calcium gluconate, calcium gluconate, calcium gluconate, glucose **OR** dextrose **OR** glucagon, hydrALAZINE, HYDROmorphone **OR** HYDROmorphone, lactated ringers, Lidocaine, magnesium hydroxide, naloxone **OR** naloxone **OR** naloxone **OR** naloxone, ondansetron **OR** ondansetron, oxyCODONE **OR** oxyCODONE, prochlorperazine **OR** prochlorperazine    Cardiographics:    Telemetry monitoring demonstrates NSR with rates in the 80s per my personal review.    Imaging:  Results for orders placed or performed during the hospital encounter of 05/02/23   XR Chest Port 1 View    Impression    IMPRESSION: Postop coronary artery bypass and mitral valve repair. Endotracheal tube is 5 cm above the rajani. Right IJ sheath is present with [place]-Goldie catheter tip in the right interlobar artery. Mediastinal and left chest tubes in place. Heart size   and vascularity are normal. Bibasilar subsegmental atelectasis. No pneumothorax.   XR Chest Port 1 View    Impression    IMPRESSION: Postop coronary artery bypass and mitral valve repair. Endotracheal tube has been removed. Right IJ sheath is no longer present. Mediastinal and left chest tubes in place. Heart size is upper limits normal and there is mild central pulmonary   vascular congestion. Bibasilar subsegmental atelectasis. No pneumothorax.       Labs, personally reviewed.  Hemoglobin   Date Value Ref Range Status   05/03/2023 12.2 (L) 13.3 - 17.7 g/dL Final   05/02/2023 13.7 13.3 - 17.7 g/dL Final   05/02/2023 13.6 13.3 - 17.7 g/dL Final     Hemoglobin POCT   Date Value Ref Range Status   05/02/2023 13.7 13.3 - 17.7 g/dL Final   05/02/2023 12.5 (L) 13.3 - 17.7 g/dL Final   05/02/2023 12.9 (L) 13.3 - 17.7 g/dL Final     WBC Count   Date  Value Ref Range Status   05/03/2023 14.4 (H) 4.0 - 11.0 10e3/uL Final   05/02/2023 16.2 (H) 4.0 - 11.0 10e3/uL Final   05/02/2023 20.4 (H) 4.0 - 11.0 10e3/uL Final     Platelet Count   Date Value Ref Range Status   05/03/2023 153 150 - 450 10e3/uL Final   05/02/2023 177 150 - 450 10e3/uL Final   05/02/2023 193 150 - 450 10e3/uL Final     Creatinine   Date Value Ref Range Status   05/03/2023 1.01 0.67 - 1.17 mg/dL Final   05/02/2023 1.13 0.67 - 1.17 mg/dL Final   04/20/2023 1.07 0.70 - 1.30 mg/dL Final     Potassium   Date Value Ref Range Status   05/03/2023 4.5 3.4 - 5.3 mmol/L Final   05/02/2023 4.5 3.4 - 5.3 mmol/L Final   05/02/2023 4.5 3.4 - 5.3 mmol/L Final   04/20/2023 3.9 3.5 - 5.0 mmol/L Final     Potassium POCT   Date Value Ref Range Status   05/02/2023 4.6 3.5 - 5.0 mmol/L Final   05/02/2023 4.9 3.5 - 5.0 mmol/L Final   05/02/2023 4.7 3.5 - 5.0 mmol/L Final     Magnesium   Date Value Ref Range Status   05/03/2023 2.1 1.7 - 2.3 mg/dL Final   05/02/2023 3.1 (H) 1.7 - 2.3 mg/dL Final   04/16/2023 1.9 1.7 - 2.3 mg/dL Final          I/O:  I/O last 3 completed shifts:  In: 4399.74 [P.O.:240; I.V.:3104.74; Other:305; IV Piggyback:750]  Out: 3179 [Urine:2512; Chest Tube:667]       Physical Exam:    General: Patient seen up in chair. NAD. Conversant. Pleasant.   CV: RRR on monitor. 2+ peripheral pulses in all extremities. Mild edema.   Pulm: Non-labored effort on nasal cannula. Chest tubes in place, serosanguinous output, no air leak. Incision C/D/I.  Abd: Soft, NT, ND  : Cedillo with arvind urine  Ext: Mild pedal edema, SCDs in place, warm, distal pulses intact  Neuro: CNs grossly intact.       ASSESSMENT/PLAN:    Guille Owusu is a 63 year old male with a history of mitral regurgitation who is POD#1 s/p mitral valve repair / LAAE.    Principal Problem:    Mitral regurgitation        NEURO:   - Scheduled Tylenol/lidocaine patches and PRN Tylenol/oxycodone/dilaudid/Toradol for pain    CV:   - Normotensive off  pressors  - Metoprolol 12.5mg two times a day pressors  - ASA 81mg  - Statin not indicated  - Chest tubes to remain today  - Will need new baseline echo prior to discharge  - No anticoagulation for a-fib s/p LAAE    PULM:   - Maintaining oxygen saturations on nasal cannula  - Encourage pulmonary toilet  - PRN albuterol  - PRN claritin    FEN/GI:  - Continue electrolyte replacement protocol  - Diet: Cardiac, ADAT   - Bowel regimen  - PTA Vit D3 and MVI    RENAL:  - Adequate UOP/hr. Continue to monitor closely via boogie.   - Cr 1.01 with recheck tomorrow (Toradol)  - Boogie to remain in for close monitoring of I/O and during period of diuresis/relative immobility.   - Diuresis with 20mg IV Lasix three times a day    HEME:  - Acute blood loss anemia post-op.   - Hgb stable, no bleeding concerns. Hep SQ, ASA    ID:  - Leny op ppx complete, afebrile. No concerns for infection    ENDO:   - Transition to SSI    PPx:   - DVT: SCDs, SQ heparin TID, ambulation   - GI: Protonix 40mg PO daily    DISPO:   - Transfer to general telemetry status      Patient discussed with Dr. Zeng.        _______  JORI PersaudC  Cardiothoracic Surgery  [phone]

## 2023-05-04 ENCOUNTER — APPOINTMENT (OUTPATIENT)
Dept: OCCUPATIONAL THERAPY | Facility: HOSPITAL | Age: 63
DRG: 219 | End: 2023-05-04
Attending: SURGERY
Payer: COMMERCIAL

## 2023-05-04 LAB
ANION GAP SERPL CALCULATED.3IONS-SCNC: 7 MMOL/L (ref 7–15)
BUN SERPL-MCNC: 29.7 MG/DL (ref 8–23)
CALCIUM SERPL-MCNC: 8.7 MG/DL (ref 8.8–10.2)
CALCIUM, IONIZED MEASURED: 1.15 MMOL/L (ref 1.11–1.3)
CHLORIDE SERPL-SCNC: 102 MMOL/L (ref 98–107)
CREAT SERPL-MCNC: 1.24 MG/DL (ref 0.67–1.17)
DEPRECATED HCO3 PLAS-SCNC: 27 MMOL/L (ref 22–29)
GFR SERPL CREATININE-BSD FRML MDRD: 66 ML/MIN/1.73M2
GLUCOSE BLDC GLUCOMTR-MCNC: 114 MG/DL (ref 70–99)
GLUCOSE BLDC GLUCOMTR-MCNC: 115 MG/DL (ref 70–99)
GLUCOSE BLDC GLUCOMTR-MCNC: 120 MG/DL (ref 70–99)
GLUCOSE BLDC GLUCOMTR-MCNC: 133 MG/DL (ref 70–99)
GLUCOSE SERPL-MCNC: 122 MG/DL (ref 70–99)
HOLD SPECIMEN: NORMAL
ION CA PH 7.4: 1.13 MMOL/L (ref 1.11–1.3)
MAGNESIUM SERPL-MCNC: 2 MG/DL (ref 1.7–2.3)
PH: 7.36 (ref 7.35–7.45)
PHOSPHATE SERPL-MCNC: 2.5 MG/DL (ref 2.5–4.5)
POTASSIUM SERPL-SCNC: 4.5 MMOL/L (ref 3.4–5.3)
SODIUM SERPL-SCNC: 136 MMOL/L (ref 136–145)

## 2023-05-04 PROCEDURE — 83735 ASSAY OF MAGNESIUM: CPT | Performed by: PHYSICIAN ASSISTANT

## 2023-05-04 PROCEDURE — 210N000001 HC R&B IMCU HEART CARE

## 2023-05-04 PROCEDURE — 84100 ASSAY OF PHOSPHORUS: CPT | Performed by: PHYSICIAN ASSISTANT

## 2023-05-04 PROCEDURE — 82330 ASSAY OF CALCIUM: CPT | Performed by: PHYSICIAN ASSISTANT

## 2023-05-04 PROCEDURE — 97110 THERAPEUTIC EXERCISES: CPT | Mod: GO

## 2023-05-04 PROCEDURE — 250N000013 HC RX MED GY IP 250 OP 250 PS 637: Performed by: PHYSICIAN ASSISTANT

## 2023-05-04 PROCEDURE — 97535 SELF CARE MNGMENT TRAINING: CPT | Mod: GO

## 2023-05-04 PROCEDURE — 36415 COLL VENOUS BLD VENIPUNCTURE: CPT | Performed by: PHYSICIAN ASSISTANT

## 2023-05-04 PROCEDURE — 80048 BASIC METABOLIC PNL TOTAL CA: CPT | Performed by: PHYSICIAN ASSISTANT

## 2023-05-04 PROCEDURE — 250N000011 HC RX IP 250 OP 636: Performed by: PHYSICIAN ASSISTANT

## 2023-05-04 RX ORDER — MAGNESIUM OXIDE 400 MG/1
400 TABLET ORAL EVERY 4 HOURS
Status: DISCONTINUED | OUTPATIENT
Start: 2023-05-04 | End: 2023-05-04

## 2023-05-04 RX ORDER — MAGNESIUM OXIDE 400 MG/1
400 TABLET ORAL EVERY 4 HOURS
Status: COMPLETED | OUTPATIENT
Start: 2023-05-04 | End: 2023-05-04

## 2023-05-04 RX ADMIN — OXYCODONE HYDROCHLORIDE 5 MG: 5 TABLET ORAL at 08:26

## 2023-05-04 RX ADMIN — MAGNESIUM OXIDE TAB 400 MG (241.3 MG ELEMENTAL MG) 400 MG: 400 (241.3 MG) TAB at 12:44

## 2023-05-04 RX ADMIN — PANTOPRAZOLE SODIUM 40 MG: 40 TABLET, DELAYED RELEASE ORAL at 06:51

## 2023-05-04 RX ADMIN — SENNOSIDES AND DOCUSATE SODIUM 1 TABLET: 50; 8.6 TABLET ORAL at 21:11

## 2023-05-04 RX ADMIN — ACETAMINOPHEN 975 MG: 325 TABLET ORAL at 05:46

## 2023-05-04 RX ADMIN — HEPARIN SODIUM 5000 UNITS: 10000 INJECTION, SOLUTION INTRAVENOUS; SUBCUTANEOUS at 13:22

## 2023-05-04 RX ADMIN — METOPROLOL TARTRATE 12.5 MG: 25 TABLET, FILM COATED ORAL at 21:11

## 2023-05-04 RX ADMIN — FUROSEMIDE 20 MG: 10 INJECTION, SOLUTION INTRAMUSCULAR; INTRAVENOUS at 18:12

## 2023-05-04 RX ADMIN — SENNOSIDES AND DOCUSATE SODIUM 1 TABLET: 50; 8.6 TABLET ORAL at 08:22

## 2023-05-04 RX ADMIN — METOPROLOL TARTRATE 12.5 MG: 25 TABLET, FILM COATED ORAL at 08:24

## 2023-05-04 RX ADMIN — OXYCODONE HYDROCHLORIDE 10 MG: 5 TABLET ORAL at 21:10

## 2023-05-04 RX ADMIN — HEPARIN SODIUM 5000 UNITS: 10000 INJECTION, SOLUTION INTRAVENOUS; SUBCUTANEOUS at 05:46

## 2023-05-04 RX ADMIN — HEPARIN SODIUM 5000 UNITS: 10000 INJECTION, SOLUTION INTRAVENOUS; SUBCUTANEOUS at 21:11

## 2023-05-04 RX ADMIN — ACETAMINOPHEN 975 MG: 325 TABLET ORAL at 21:09

## 2023-05-04 RX ADMIN — LORATADINE 10 MG: 10 TABLET ORAL at 08:22

## 2023-05-04 RX ADMIN — ACETAMINOPHEN 975 MG: 325 TABLET ORAL at 13:22

## 2023-05-04 RX ADMIN — THERA TABS 1 TABLET: TAB at 08:32

## 2023-05-04 RX ADMIN — ASPIRIN 81 MG 81 MG: 81 TABLET ORAL at 08:24

## 2023-05-04 RX ADMIN — POTASSIUM & SODIUM PHOSPHATES POWDER PACK 280-160-250 MG 1 PACKET: 280-160-250 PACK at 09:47

## 2023-05-04 RX ADMIN — POTASSIUM & SODIUM PHOSPHATES POWDER PACK 280-160-250 MG 1 PACKET: 280-160-250 PACK at 12:43

## 2023-05-04 RX ADMIN — POLYETHYLENE GLYCOL 3350 17 G: 17 POWDER, FOR SOLUTION ORAL at 08:27

## 2023-05-04 RX ADMIN — ONDANSETRON 4 MG: 4 TABLET, ORALLY DISINTEGRATING ORAL at 08:32

## 2023-05-04 RX ADMIN — OXYCODONE HYDROCHLORIDE 5 MG: 5 TABLET ORAL at 12:43

## 2023-05-04 RX ADMIN — Medication 50 MCG: at 08:23

## 2023-05-04 RX ADMIN — MAGNESIUM OXIDE TAB 400 MG (241.3 MG ELEMENTAL MG) 400 MG: 400 (241.3 MG) TAB at 08:25

## 2023-05-04 RX ADMIN — FUROSEMIDE 20 MG: 10 INJECTION, SOLUTION INTRAMUSCULAR; INTRAVENOUS at 12:44

## 2023-05-04 RX ADMIN — FUROSEMIDE 20 MG: 10 INJECTION, SOLUTION INTRAMUSCULAR; INTRAVENOUS at 06:51

## 2023-05-04 ASSESSMENT — ACTIVITIES OF DAILY LIVING (ADL)
ADLS_ACUITY_SCORE: 28

## 2023-05-04 NOTE — CONSULTS
NUTRITION EDUCATION      REASON FOR ASSESSMENT:  To educate on heart healthy diet    NUTRITION HISTORY:  Information obtained from pt and his wife    Pt states he already follows a low sodium diet at home. He and his wife did accept low sodium diet written materials but pt  Declined further education.  He states he's not diabetic and would like that order discontinued. He states he had mitral valve repair and asked about what diet is recommended which is low sodium.    CURRENT DIET:  Consistent CHO (60 g CHO with each meal), low saturated fat, 2 gram Na    INTERVENTIONS:    Nutrition Prescription:  Low sodium    Implementation:     Pt and his wife accepted written information on low sodium diet but pt declined to go over it as he states he's already been following a low sodium diet.    Goals:      *  Patient will verbalize understanding of diet-met      *  All of the above goals met during the education session    Follow Up/Monitoring:      *  Provided RD contact information for future questions      *  Recommended Out-Patient Nutrition Referral, if further diet instructions are needed

## 2023-05-04 NOTE — PROGRESS NOTES
CVTS Daily Progress Note   POD#2 s/p mitral valve repair / LAAE  Attending: Karri  LOS: 2    SUBJECTIVE/INTERVAL EVENTS:    No acute events overnight. Normotensive. NSR. Patient progressing well. Maintaining oxygen saturations on room air. Up to chair this AM and ambulating with therapy. Pain well controlled. - BM / flatus. Tolerating diet with some nausea last night; improving. UOP adequate. Chest tube output appropriate. Cr 1.24. Patient denies new chest pain, shortness of breath, abdominal pain, calf pain, nausea. Patient has no questions today.    OBJECTIVE:  Temp:  [97.7  F (36.5  C)-98.2  F (36.8  C)] 98  F (36.7  C)  Pulse:  [73-84] 73  Resp:  [16-20] 20  BP: ()/(53-67) 108/64  SpO2:  [91 %-97 %] 91 %  FiO2 (%): 30 %  Resp: 20    Vitals:    05/02/23 0533 05/03/23 0645 05/04/23 0646   Weight: 88.9 kg (196 lb) 92.3 kg (203 lb 6.4 oz) 92.7 kg (204 lb 4.8 oz)       Current Medications:    Scheduled Meds:    acetaminophen  975 mg Oral or Feeding Tube Q8H     aspirin  81 mg Oral or NG Tube Daily     furosemide  20 mg Intravenous TID     heparin ANTICOAGULANT  5,000 Units Subcutaneous Q8H     insulin aspart  1-7 Units Subcutaneous TID AC     insulin aspart  1-5 Units Subcutaneous At Bedtime     lidocaine   Transdermal Q8H TERESO     loratadine  10 mg Oral or Feeding Tube Daily     magnesium oxide  400 mg Oral Q4H     metoprolol tartrate  12.5 mg Oral BID     multivitamin, therapeutic  1 tablet Oral Daily     pantoprazole  40 mg Oral or NG Tube QAM AC    Or     pantoprazole  40 mg Oral QAM AC     polyethylene glycol  17 g Oral or Feeding Tube Daily     potassium & sodium phosphates  1 packet Oral or Feeding Tube Q4H     senna-docusate  1 tablet Oral or Feeding Tube BID     cholecalciferol  50 mcg Oral or Feeding Tube Daily     Continuous Infusions:  PRN Meds:.[START ON 5/12/2023] acetaminophen, albuterol, bisacodyl, calcium gluconate, calcium gluconate, calcium gluconate, glucose **OR** dextrose **OR**  glucagon, hydrALAZINE, lactated ringers, Lidocaine, magnesium hydroxide, naloxone **OR** naloxone **OR** naloxone **OR** naloxone, ondansetron **OR** ondansetron, oxyCODONE **OR** oxyCODONE, prochlorperazine **OR** prochlorperazine    Cardiographics:    Telemetry monitoring demonstrates NSR with rates in the 70s per my personal review.    Imaging:  Results for orders placed or performed during the hospital encounter of 05/02/23   XR Chest Port 1 View    Impression    IMPRESSION: Postop coronary artery bypass and mitral valve repair. Endotracheal tube is 5 cm above the rajani. Right IJ sheath is present with [place]-Goldie catheter tip in the right interlobar artery. Mediastinal and left chest tubes in place. Heart size   and vascularity are normal. Bibasilar subsegmental atelectasis. No pneumothorax.   XR Chest Port 1 View    Impression    IMPRESSION: Postop coronary artery bypass and mitral valve repair. Endotracheal tube has been removed. Right IJ sheath is no longer present. Mediastinal and left chest tubes in place. Heart size is upper limits normal and there is mild central pulmonary   vascular congestion. Bibasilar subsegmental atelectasis. No pneumothorax.       Labs, personally reviewed.  Hemoglobin   Date Value Ref Range Status   05/03/2023 12.2 (L) 13.3 - 17.7 g/dL Final   05/02/2023 13.7 13.3 - 17.7 g/dL Final   05/02/2023 13.6 13.3 - 17.7 g/dL Final     Hemoglobin POCT   Date Value Ref Range Status   05/02/2023 13.7 13.3 - 17.7 g/dL Final   05/02/2023 12.5 (L) 13.3 - 17.7 g/dL Final   05/02/2023 12.9 (L) 13.3 - 17.7 g/dL Final     WBC Count   Date Value Ref Range Status   05/03/2023 14.4 (H) 4.0 - 11.0 10e3/uL Final   05/02/2023 16.2 (H) 4.0 - 11.0 10e3/uL Final   05/02/2023 20.4 (H) 4.0 - 11.0 10e3/uL Final     Platelet Count   Date Value Ref Range Status   05/03/2023 153 150 - 450 10e3/uL Final   05/02/2023 177 150 - 450 10e3/uL Final   05/02/2023 193 150 - 450 10e3/uL Final     Creatinine   Date Value Ref  Range Status   05/04/2023 1.24 (H) 0.67 - 1.17 mg/dL Final   05/03/2023 1.01 0.67 - 1.17 mg/dL Final   05/02/2023 1.13 0.67 - 1.17 mg/dL Final     Potassium   Date Value Ref Range Status   05/04/2023 4.5 3.4 - 5.3 mmol/L Final   05/03/2023 4.5 3.4 - 5.3 mmol/L Final   05/02/2023 4.5 3.4 - 5.3 mmol/L Final   05/02/2023 4.5 3.4 - 5.3 mmol/L Final   04/20/2023 3.9 3.5 - 5.0 mmol/L Final     Potassium POCT   Date Value Ref Range Status   05/02/2023 4.6 3.5 - 5.0 mmol/L Final   05/02/2023 4.9 3.5 - 5.0 mmol/L Final   05/02/2023 4.7 3.5 - 5.0 mmol/L Final     Magnesium   Date Value Ref Range Status   05/04/2023 2.0 1.7 - 2.3 mg/dL Final   05/03/2023 2.1 1.7 - 2.3 mg/dL Final   05/02/2023 3.1 (H) 1.7 - 2.3 mg/dL Final          I/O:  I/O last 3 completed shifts:  In: 681 [P.O.:680; I.V.:1]  Out: 2310 [Urine:1900; Chest Tube:410]       Physical Exam:    General: Patient seen up in chair. NAD. Conversant. Pleasant.   CV: RRR on monitor. 2+ peripheral pulses in all extremities. Mild edema.   Pulm: Non-labored effort on room air. Chest tubes in place, serosanguinous output, no air leak. Incision C/D/I.  Abd: Soft, NT, ND  Ext: Mild pedal edema, SCDs in place, warm, distal pulses intact  Neuro: CNs grossly intact.       ASSESSMENT/PLAN:    Guille Owusu is a 63 year old male with a history of mitral regurgitation who is POD#2 s/p mitral valve repair / LAAE.    Principal Problem:    Mitral regurgitation        NEURO:   - Scheduled Tylenol/lidocaine patches and PRN Tylenol/oxycodone for pain  - Toradol stopped due to Cr rise    CV:   - Pre-op EF 60-65%  - Normotensive  - Metoprolol 12.5mg two times a day  - ASA 81mg  - Statin not indicated  - Chest tubes to remain today  - Will need new baseline echo prior to discharge  - No anticoagulation for a-fib s/p LAAE    PULM:   - Maintaining oxygen saturations on room air  - Encourage pulmonary toilet  - PRN albuterol  - PRN claritin    FEN/GI:  - Continue electrolyte replacement  protocol  - Diet: Cardiac, ADAT   - Bowel regimen  - PTA Vit D3 and MVI    RENAL:  - Adequate UOP/hr. Continue to monitor closely.  - Cr 1.24 with baseline closer to 1. Toradol stopped.   - Cedillo to remain in for close monitoring of I/O and during period of diuresis/relative immobility.   - Diuresis with 20mg IV Lasix three times a day    HEME:  - Acute blood loss anemia post-op.   - No bleeding concerns. Hep SQ, ASA    ID:  - Leny op ppx complete, afebrile. No concerns for infection    ENDO:   - SSI    PPx:   - DVT: SCDs, SQ heparin TID, ambulation   - GI: Protonix 40mg PO daily    DISPO:   - General telemetry status      Patient discussed with Dr. Zeng.        _______  JORI PersaudC  Cardiothoracic Surgery  [phone]

## 2023-05-04 NOTE — PROGRESS NOTES
05/04/23 0305   Home O2/Equipment Set-Up   Home O2 Device CPAP   Pt Owned Device Yes;Clean;Functional;Pt. Demonstrates Use

## 2023-05-04 NOTE — PLAN OF CARE
Goal Outcome Evaluation:                    Pt is alert and oriented x4 and able to make needs known.  Cedillo in place and draining adequately.  Chest tubes in place and pacer wires capped.  Pt was up in chair since arrival to floor and just got back into bed.  He is moving well.  Toradol given x1 for pain.  Incision is clean dry and intact.  Will continue to monitor.

## 2023-05-04 NOTE — PLAN OF CARE
Continues on room air.  Chest tube to -20, drainage darker pink with ambulation, dressing intact.  Incision with derma bond, no signs of infection.  OxyContin PRN for pain management. Wife at bedside- supportive. Falls and pressure ulcer prevention plans in place (low risk). Maggie Silva RN      Problem: Cardiovascular Surgery  Goal: Acceptable Pain Control  Outcome: Progressing  Goal: Improved Activity Tolerance  Outcome: Progressing  Goal: Improved Activity Tolerance  Outcome: Progressing  Goal: Absence of Infection Signs and Symptoms  Outcome: Progressing

## 2023-05-04 NOTE — PROGRESS NOTES
sc  Patient Name: Guille Owusu   MRN: [id number]   Date of Admission: 5/2/2023    Procedure: Procedure(s):  Mitral valve repair with anesthesia transesophageal echocardiogram, left atrial appendage excision, epiaortic ultrasound    Post Op day #:2    Subjective (Patient focus/Primary Problem for shift): sleep/pain management          Pain Goal0 Pain Rating0           Pain Medication/ Regime effective to reduce patient painscheduled Tylenol    Objective (Physical assessment):           Rhythm: normal sinus rhythm            Bowel Activity: no if Yes indicate when: na          Bowel Medications: yes            Incision: healing well          Incentive Spirometry Q 1-2 hour when awake:  yes Volume: 1750          Epicardial Pacing Wires:  yes            Patient Activity:           Up to chair for meals: yes          Ambulation with RN x2 (Not including CR): yes            Is patient in home clothes:no             Chest Tubes   Pleural: yes Draining: yes               Suction: yes              Mediastinal: yes Draining: yes               Suction: yes   Dressing Change Daily:yes If No, why?na                     Urinary Catheter: yes           Preventative WOC consult (need MD order): no       Assessment (Nursing primary shift focus): wean 02, pain management/remove boogie in am.    Plan (Patient Care Plan/focus): sleep/pain management.  Plan is to get patient up in chair for bfst and remove boogie.    Samara Thompson RN   5/4/2023   3:28 AM

## 2023-05-04 NOTE — PROGRESS NOTES
SPIRITUAL HEALTH SERVICES NOTE  [facility]; P3    FULL SPIRITUAL CARE NOTE:   Follow-up visit with Dank and Radha. They were surprised at how quickly they were discharged from the ICU yesterday on such short notice, but Dank says that it has worked out fairly well, noting that he has been able to sleep a bit more with fewer interruptions. He is encouraged by the progress he has made each day with physical therapy. Radha acknowledges some trepidation as they start to think about discharge, but Dank's cardiologist is a good friend and they also have a friend who is a retired ICU nurse who is checking in on them. They are grateful that Dank's pain is under control today and that it has been a better day than yesterday.     Time Spent with Patient/Family: 20 minutes    PLAN OF CARE: Will remain available for further support as patient/family needs/desires.    Evelyn Kenyon M.Div.      Office: [phone] (for non-urgent requests)  Please Vocera or page through [facility] for time-sensitive requests

## 2023-05-05 ENCOUNTER — APPOINTMENT (OUTPATIENT)
Dept: OCCUPATIONAL THERAPY | Facility: HOSPITAL | Age: 63
DRG: 219 | End: 2023-05-05
Attending: SURGERY
Payer: COMMERCIAL

## 2023-05-05 ENCOUNTER — APPOINTMENT (OUTPATIENT)
Dept: RADIOLOGY | Facility: HOSPITAL | Age: 63
DRG: 219 | End: 2023-05-05
Attending: PHYSICIAN ASSISTANT
Payer: COMMERCIAL

## 2023-05-05 ENCOUNTER — APPOINTMENT (OUTPATIENT)
Dept: CARDIOLOGY | Facility: HOSPITAL | Age: 63
DRG: 219 | End: 2023-05-05
Attending: PHYSICIAN ASSISTANT
Payer: COMMERCIAL

## 2023-05-05 LAB
CALCIUM, IONIZED MEASURED: 1.08 MMOL/L (ref 1.11–1.3)
CALCIUM, IONIZED MEASURED: 1.11 MMOL/L (ref 1.11–1.3)
GLUCOSE BLDC GLUCOMTR-MCNC: 108 MG/DL (ref 70–99)
GLUCOSE BLDC GLUCOMTR-MCNC: 114 MG/DL (ref 70–99)
GLUCOSE BLDC GLUCOMTR-MCNC: 120 MG/DL (ref 70–99)
GLUCOSE BLDC GLUCOMTR-MCNC: 96 MG/DL (ref 70–99)
HOLD SPECIMEN: NORMAL
ION CA PH 7.4: 1.1 MMOL/L (ref 1.11–1.3)
ION CA PH 7.4: 1.12 MMOL/L (ref 1.11–1.3)
MAGNESIUM SERPL-MCNC: 1.9 MG/DL (ref 1.7–2.3)
PH: 7.38 (ref 7.35–7.45)
PH: 7.46 (ref 7.35–7.45)
PHOSPHATE SERPL-MCNC: 2 MG/DL (ref 2.5–4.5)
POTASSIUM SERPL-SCNC: 3.9 MMOL/L (ref 3.4–5.3)

## 2023-05-05 PROCEDURE — 83735 ASSAY OF MAGNESIUM: CPT | Performed by: PHYSICIAN ASSISTANT

## 2023-05-05 PROCEDURE — 250N000013 HC RX MED GY IP 250 OP 250 PS 637: Performed by: PHYSICIAN ASSISTANT

## 2023-05-05 PROCEDURE — 250N000011 HC RX IP 250 OP 636: Performed by: PHYSICIAN ASSISTANT

## 2023-05-05 PROCEDURE — 84132 ASSAY OF SERUM POTASSIUM: CPT | Performed by: PHYSICIAN ASSISTANT

## 2023-05-05 PROCEDURE — 93306 TTE W/DOPPLER COMPLETE: CPT | Mod: 26 | Performed by: INTERNAL MEDICINE

## 2023-05-05 PROCEDURE — 210N000001 HC R&B IMCU HEART CARE

## 2023-05-05 PROCEDURE — 82330 ASSAY OF CALCIUM: CPT | Performed by: PHYSICIAN ASSISTANT

## 2023-05-05 PROCEDURE — 97535 SELF CARE MNGMENT TRAINING: CPT | Mod: GO

## 2023-05-05 PROCEDURE — 93306 TTE W/DOPPLER COMPLETE: CPT

## 2023-05-05 PROCEDURE — 97110 THERAPEUTIC EXERCISES: CPT | Mod: GO

## 2023-05-05 PROCEDURE — 84100 ASSAY OF PHOSPHORUS: CPT | Performed by: PHYSICIAN ASSISTANT

## 2023-05-05 PROCEDURE — 71046 X-RAY EXAM CHEST 2 VIEWS: CPT

## 2023-05-05 PROCEDURE — 36415 COLL VENOUS BLD VENIPUNCTURE: CPT | Performed by: PHYSICIAN ASSISTANT

## 2023-05-05 RX ORDER — MAGNESIUM OXIDE 400 MG/1
400 TABLET ORAL EVERY 4 HOURS
Status: COMPLETED | OUTPATIENT
Start: 2023-05-05 | End: 2023-05-05

## 2023-05-05 RX ADMIN — Medication 400 MG: at 07:48

## 2023-05-05 RX ADMIN — Medication 50 MCG: at 08:00

## 2023-05-05 RX ADMIN — HEPARIN SODIUM 5000 UNITS: 10000 INJECTION, SOLUTION INTRAVENOUS; SUBCUTANEOUS at 05:09

## 2023-05-05 RX ADMIN — OXYCODONE HYDROCHLORIDE 5 MG: 5 TABLET ORAL at 08:51

## 2023-05-05 RX ADMIN — OXYCODONE HYDROCHLORIDE 5 MG: 5 TABLET ORAL at 21:25

## 2023-05-05 RX ADMIN — FUROSEMIDE 20 MG: 10 INJECTION, SOLUTION INTRAMUSCULAR; INTRAVENOUS at 17:44

## 2023-05-05 RX ADMIN — ASPIRIN 81 MG 81 MG: 81 TABLET ORAL at 08:00

## 2023-05-05 RX ADMIN — CALCIUM GLUCONATE 1 G: 20 INJECTION, SOLUTION INTRAVENOUS at 07:49

## 2023-05-05 RX ADMIN — POLYETHYLENE GLYCOL 3350 17 G: 17 POWDER, FOR SOLUTION ORAL at 08:01

## 2023-05-05 RX ADMIN — Medication 400 MG: at 12:52

## 2023-05-05 RX ADMIN — ACETAMINOPHEN 975 MG: 325 TABLET ORAL at 21:24

## 2023-05-05 RX ADMIN — METOPROLOL TARTRATE 12.5 MG: 25 TABLET, FILM COATED ORAL at 08:00

## 2023-05-05 RX ADMIN — HEPARIN SODIUM 5000 UNITS: 10000 INJECTION, SOLUTION INTRAVENOUS; SUBCUTANEOUS at 21:26

## 2023-05-05 RX ADMIN — ACETAMINOPHEN 975 MG: 325 TABLET ORAL at 13:11

## 2023-05-05 RX ADMIN — LORATADINE 10 MG: 10 TABLET ORAL at 08:00

## 2023-05-05 RX ADMIN — HEPARIN SODIUM 5000 UNITS: 10000 INJECTION, SOLUTION INTRAVENOUS; SUBCUTANEOUS at 13:13

## 2023-05-05 RX ADMIN — THERA TABS 1 TABLET: TAB at 08:00

## 2023-05-05 RX ADMIN — POTASSIUM & SODIUM PHOSPHATES POWDER PACK 280-160-250 MG 1 PACKET: 280-160-250 PACK at 16:43

## 2023-05-05 RX ADMIN — PANTOPRAZOLE SODIUM 40 MG: 40 TABLET, DELAYED RELEASE ORAL at 06:42

## 2023-05-05 RX ADMIN — ACETAMINOPHEN 975 MG: 325 TABLET ORAL at 05:08

## 2023-05-05 RX ADMIN — LIDOCAINE 2 PATCH: 4 PATCH TOPICAL at 14:16

## 2023-05-05 RX ADMIN — FUROSEMIDE 20 MG: 10 INJECTION, SOLUTION INTRAMUSCULAR; INTRAVENOUS at 12:55

## 2023-05-05 RX ADMIN — POTASSIUM & SODIUM PHOSPHATES POWDER PACK 280-160-250 MG 1 PACKET: 280-160-250 PACK at 12:54

## 2023-05-05 RX ADMIN — SENNOSIDES AND DOCUSATE SODIUM 1 TABLET: 50; 8.6 TABLET ORAL at 08:00

## 2023-05-05 RX ADMIN — POTASSIUM & SODIUM PHOSPHATES POWDER PACK 280-160-250 MG 1 PACKET: 280-160-250 PACK at 08:16

## 2023-05-05 RX ADMIN — METOPROLOL TARTRATE 12.5 MG: 25 TABLET, FILM COATED ORAL at 21:25

## 2023-05-05 RX ADMIN — FUROSEMIDE 20 MG: 10 INJECTION, SOLUTION INTRAMUSCULAR; INTRAVENOUS at 06:42

## 2023-05-05 RX ADMIN — SENNOSIDES AND DOCUSATE SODIUM 1 TABLET: 50; 8.6 TABLET ORAL at 21:25

## 2023-05-05 ASSESSMENT — ACTIVITIES OF DAILY LIVING (ADL)
ADLS_ACUITY_SCORE: 28
ADLS_ACUITY_SCORE: 26
ADLS_ACUITY_SCORE: 28
ADLS_ACUITY_SCORE: 26
ADLS_ACUITY_SCORE: 26

## 2023-05-05 NOTE — PROGRESS NOTES
Care Management Follow Up    Length of Stay (days): 3    Expected Discharge Date: 05/08/2023     Concerns to be Addressed: no discharge needs identified     Patient plan of care discussed at interdisciplinary rounds: Yes    Anticipated Discharge Disposition: home      Anticipated Discharge Services: none   Anticipated Discharge DME: none     Patient/family educated on Medicare website which has current facility and service quality ratings: N/A   Education Provided on the Discharge Plan: N/A   Patient/Family in Agreement with the Plan: yes     Referrals Placed by CM/SW: not indicated at this time   Private pay costs discussed: Not applicable    Additional Information:  3:18 PM  Chart reviewed. Pt lives with his wife in a house. Pt is independent at baseline. Pt does not use any services or DME. Goal is to return home with family at discharge. CM to follow progression of Pt's care.    ASHTYN Sexton

## 2023-05-05 NOTE — PLAN OF CARE
sc  Patient Name: Guille Owusu   MRN: [id number]   Date of Admission: 5/2/2023    Procedure: Procedure(s):  Mitral valve repair with anesthesia transesophageal echocardiogram, left atrial appendage excision, epiaortic ultrasound    Post Op day #:2    Subjective (Patient focus/Primary Problem for shift): Pain, ambulation, elimination          Pain Goalno pain Pain Rating4/10           Pain Medication/ Regime effective to reduce patient painPRN Oxycodone and scheduled Tylenol    Objective (Physical assessment):           Rhythm: normal sinus rhythm            Bowel Activity: no if Yes indicate when: n/a          Bowel Medications: yes            Incision: healing well          Incentive Spirometry Q 1-2 hour when awake:  yes Volume: 1750          Epicardial Pacing Wires:  yes            Patient Activity:           Up to chair for meals: yes          Ambulation with RN x2 (Not including CR): yes , ambulated once out of the room           Is patient in home clothes:no             Chest Tubes   Pleural: yes Draining: yes               Suction: yes              Mediastinal: yes Draining: yes               Suction: yes   Dressing Change Daily:yes If No, why?n/a                     Urinary Catheter: no           Preventative WOC consult (need MD order): n/a      Assessment (Nursing primary shift focus): Tolerating Room Air. Activity encouraged, up in the chair for meals and ambulating in room and in hallway. Pt c/o light headedness which resolves on it own. BP was stable, legs elevated while in chair.  Denied Nause/Vomiting, ate well. Passing gas, no BM yet. Pain managed well with PRN Oxycodone and scheduled Tylenol. No leak on chest tubes, minimal drainage. Pacerwires capped. Incision c/d/I. BG acceptable. Uses own CPAP for sleep. Adequate UOP.    Plan (Patient Care Plan/focus): Follow up I.S administration, Managed pain. Promote Bowel Elimination. Encourage Activity/ Ambulation.       Deb Stroud RN   5/4/2023    10:19 PM

## 2023-05-05 NOTE — PROGRESS NOTES
CVTS Daily Progress Note   POD#3 s/p mitral valve repair / LAAE  Attending: Karri  LOS: 3    SUBJECTIVE/INTERVAL EVENTS:    No acute events overnight. Normotensive. NSR. Patient progressing well. Maintaining oxygen saturations on room air. Up to chair this AM and ambulating with therapy. Pain well controlled. - BM / +flatus. Tolerating diet with no nausea today. UOP adequate. Chest tube output appropriate. Cr 1.24. Patient denies new chest pain, shortness of breath, abdominal pain, calf pain, nausea. Patient has no questions today.    OBJECTIVE:  Temp:  [97.9  F (36.6  C)-98.3  F (36.8  C)] 98.1  F (36.7  C)  Pulse:  [67-81] 73  Resp:  [18-22] 20  BP: (106-127)/(63-79) 118/76  FiO2 (%):  [98.2 %] 98.2 %  SpO2:  [89 %-96 %] 94 %  FiO2 (%): 98.2 %  Resp: 20    Vitals:    05/02/23 0533 05/03/23 0645 05/04/23 0646 05/05/23 0203   Weight: 88.9 kg (196 lb) 92.3 kg (203 lb 6.4 oz) 92.7 kg (204 lb 4.8 oz) 91.4 kg (201 lb 6.4 oz)       Current Medications:    Scheduled Meds:    acetaminophen  975 mg Oral or Feeding Tube Q8H     aspirin  81 mg Oral or NG Tube Daily     furosemide  20 mg Intravenous TID     heparin ANTICOAGULANT  5,000 Units Subcutaneous Q8H     insulin aspart  1-7 Units Subcutaneous TID AC     insulin aspart  1-5 Units Subcutaneous At Bedtime     lidocaine   Transdermal Q8H TERESO     loratadine  10 mg Oral or Feeding Tube Daily     magnesium oxide  400 mg Oral Q4H     metoprolol tartrate  12.5 mg Oral BID     multivitamin, therapeutic  1 tablet Oral Daily     pantoprazole  40 mg Oral or NG Tube QAM AC    Or     pantoprazole  40 mg Oral QAM AC     polyethylene glycol  17 g Oral or Feeding Tube Daily     potassium & sodium phosphates  1 packet Oral or Feeding Tube Q4H     senna-docusate  1 tablet Oral or Feeding Tube BID     cholecalciferol  50 mcg Oral or Feeding Tube Daily     Continuous Infusions:  PRN Meds:.[START ON 5/12/2023] acetaminophen, albuterol, bisacodyl, calcium gluconate, calcium gluconate,  calcium gluconate, glucose **OR** dextrose **OR** glucagon, hydrALAZINE, lactated ringers, Lidocaine, magnesium hydroxide, naloxone **OR** naloxone **OR** naloxone **OR** naloxone, ondansetron **OR** ondansetron, oxyCODONE **OR** oxyCODONE, prochlorperazine **OR** prochlorperazine    Cardiographics:    Telemetry monitoring demonstrates NSR with rates in the 70s per my personal review.    Imaging:  Results for orders placed or performed during the hospital encounter of 05/02/23   XR Chest Port 1 View    Impression    IMPRESSION: Postop coronary artery bypass and mitral valve repair. Endotracheal tube is 5 cm above the rajani. Right IJ sheath is present with [place] catheter tip in the right interlobar artery. Mediastinal and left chest tubes in place. Heart size   and vascularity are normal. Bibasilar subsegmental atelectasis. No pneumothorax.   XR Chest Port 1 View    Impression    IMPRESSION: Postop coronary artery bypass and mitral valve repair. Endotracheal tube has been removed. Right IJ sheath is no longer present. Mediastinal and left chest tubes in place. Heart size is upper limits normal and there is mild central pulmonary   vascular congestion. Bibasilar subsegmental atelectasis. No pneumothorax.       Labs, personally reviewed.  Hemoglobin   Date Value Ref Range Status   05/03/2023 12.2 (L) 13.3 - 17.7 g/dL Final   05/02/2023 13.7 13.3 - 17.7 g/dL Final   05/02/2023 13.6 13.3 - 17.7 g/dL Final     Hemoglobin POCT   Date Value Ref Range Status   05/02/2023 13.7 13.3 - 17.7 g/dL Final   05/02/2023 12.5 (L) 13.3 - 17.7 g/dL Final   05/02/2023 12.9 (L) 13.3 - 17.7 g/dL Final     WBC Count   Date Value Ref Range Status   05/03/2023 14.4 (H) 4.0 - 11.0 10e3/uL Final   05/02/2023 16.2 (H) 4.0 - 11.0 10e3/uL Final   05/02/2023 20.4 (H) 4.0 - 11.0 10e3/uL Final     Platelet Count   Date Value Ref Range Status   05/03/2023 153 150 - 450 10e3/uL Final   05/02/2023 177 150 - 450 10e3/uL Final   05/02/2023 193 150 -  450 10e3/uL Final     Creatinine   Date Value Ref Range Status   05/04/2023 1.24 (H) 0.67 - 1.17 mg/dL Final   05/03/2023 1.01 0.67 - 1.17 mg/dL Final   05/02/2023 1.13 0.67 - 1.17 mg/dL Final     Potassium   Date Value Ref Range Status   05/05/2023 3.9 3.4 - 5.3 mmol/L Final   05/04/2023 4.5 3.4 - 5.3 mmol/L Final   05/03/2023 4.5 3.4 - 5.3 mmol/L Final   04/20/2023 3.9 3.5 - 5.0 mmol/L Final     Potassium POCT   Date Value Ref Range Status   05/02/2023 4.6 3.5 - 5.0 mmol/L Final   05/02/2023 4.9 3.5 - 5.0 mmol/L Final   05/02/2023 4.7 3.5 - 5.0 mmol/L Final     Magnesium   Date Value Ref Range Status   05/05/2023 1.9 1.7 - 2.3 mg/dL Final   05/04/2023 2.0 1.7 - 2.3 mg/dL Final   05/03/2023 2.1 1.7 - 2.3 mg/dL Final          I/O:  I/O last 3 completed shifts:  In: 1830 [P.O.:1830]  Out: 3166 [Urine:3025; Chest Tube:141]       Physical Exam:    General: Patient seen up in chair. NAD. Conversant. Pleasant.   CV: RRR on monitor. 2+ peripheral pulses in all extremities. Mild edema.   Pulm: Non-labored effort on room air. Chest tubes in place, serosanguinous output, no air leak. Incision C/D/I.  Abd: Soft, NT, ND  Ext: Mild pedal edema, SCDs in place, warm, distal pulses intact  Neuro: CNs grossly intact.       ASSESSMENT/PLAN:    Guille Owusu is a 63 year old male with a history of mitral regurgitation who is POD#3 s/p mitral valve repair / LAAE.    Principal Problem:    Mitral regurgitation        NEURO:   - Scheduled Tylenol/lidocaine patches and PRN Tylenol/oxycodone for pain  - Toradol stopped due to Cr rise    CV:   - Pre-op EF 60-65%  - Normotensive  - Metoprolol 12.5mg two times a day  - ASA 81mg  - Statin not indicated  - Chest tubes and TPW's removed without immediate complications.  - New baseline echo ordered 5/5-need before discharge  - No anticoagulation for a-fib s/p LAAE    PULM:   - Maintaining oxygen saturations on room air  - Encourage pulmonary toilet  - PRN albuterol  - PRN claritin  - F/u CXR  today.    FEN/GI:  - Continue electrolyte replacement protocol  - Diet: Cardiac, ADAT   - Bowel regimen  - PTA Vit D3 and MVI    RENAL:  - Adequate UOP/hr. Continue to monitor closely.  - Cr 1.24 with baseline closer to 1. Toradol stopped.   - Cedillo removed  - Diuresis with 20mg IV Lasix three times a day    HEME:  - Acute blood loss anemia post-op.   - No bleeding concerns. Hep SQ, ASA    ID:  - Leny op ppx complete, afebrile. No concerns for infection    ENDO:   - SSI    PPx:   - DVT: SCDs, SQ heparin TID, ambulation   - GI: Protonix 40mg PO daily    DISPO:   - General telemetry status  - Home 24-48 hrs.      Patient discussed with Dr. Zeng.    Juli Barnett PA-C  Cardiothoracic Surgery  [phone]

## 2023-05-05 NOTE — PROGRESS NOTES
sc  Patient Name: Guille Owusu   MRN: [id number]   Date of Admission: 5/2/2023    Procedure: Procedure(s):  Mitral valve repair with anesthesia transesophageal echocardiogram, left atrial appendage excision, epiaortic ultrasound    Post Op day #:3    Subjective (Patient focus/Primary Problem for shift): sleep/pain management          Pain Goal0 Pain Rating0           Pain Medication/ Regime effective to reduce patient pain scheduled Tylenol    Objective (Physical assessment):           Rhythm: normal sinus rhythm            Bowel Activity: no if Yes indicate when: na          Bowel Medications: yes            Incision: healing well          Incentive Spirometry Q 1-2 hour when awake:  yes Volume: 1750            Epicardial Pacing Wires:  yes            Patient Activity:           Up to chair for meals: yes          Ambulation with RN x2 (Not including CR): yes            Is patient in home clothes:no             Chest Tubes   Pleural: yes Draining: yes               Suction: yes              Mediastinal: yes Draining: yes               Suction: yes   Dressing Change Daily:yes If No, why?na                     Urinary Catheter: no           Preventative WOC consult (need MD order): no       Assessment (Nursing primary shift focus): patient is progressing well, call's for help, passing gas, no bm at this time. Pacer wire's capped and uses c-pap with 2 liter's 02 during sleep. Uses urinal at bed side with adequate output.    Plan (Patient Care Plan/focus): sleep/pain management.      Samara Thompson RN   5/5/2023   2:38 AM

## 2023-05-05 NOTE — PLAN OF CARE
sc  Patient Name: Guille Owusu   MRN: [id number]   Date of Admission: 5/2/2023    Procedure: Procedure(s):  Mitral valve repair with anesthesia transesophageal echocardiogram, left atrial appendage excision, epiaortic ultrasound    Post Op day #:3    Subjective (Patient focus/Primary Problem for shift): pain and amulation          Pain Goal0 Pain Rating0           Pain Medication/ Regime effective to reduce patient painPRN oxy and schedueled tylenol.    Objective (Physical assessment):           Rhythm: normal sinus rhythm            Bowel Activity: no if Yes indicate when: n/a          Bowel Medications: yes            Incision: healing well          Incentive Spirometry Q 1-2 hour when awake:  yes Volume: 1750          Epicardial Pacing Wires:  removed today            Patient Activity:           Up to chair for meals: yes          Ambulation with RN x2 (Not including CR): yes            Is patient in home clothes:no             Chest Tubes   Pleural: no Draining: not applicable               Suction: not applicable              Mediastinal: no Draining: not applicable               Suction: not applicable   Dressing Change Daily:no If No, why?open to air                     Urinary Catheter: no           Preventative WOC consult (need MD order): no       Assessment (Nursing primary shift focus): Pt is alert and oriented. Denies SOB. Reports feeling mild SOB with exertion. Per OT, pt is ok to walk around independently, steady on feet. PRN oxycodone 5mg given around 8am for a pain rating of 4, pts preference was to be given this before cardiac rehab at 9am d/t increase in pain. No BM yet but passing flatus. Sternal care completed. Chest tubes and epicardial wires were removed this AM, pt tolerating well. Lidocaine patches placed. Echo and chest x-ray completed today. PRN calcium gluconate infusion given this AM for an ionized calcium of 1.08. Awaiting lab redraw.     Plan (Patient Care Plan/focus): Have a  BM.      Mayelin Tabor RN   5/5/2023   1:52 PM

## 2023-05-06 ENCOUNTER — APPOINTMENT (OUTPATIENT)
Dept: OCCUPATIONAL THERAPY | Facility: HOSPITAL | Age: 63
DRG: 219 | End: 2023-05-06
Attending: SURGERY
Payer: COMMERCIAL

## 2023-05-06 VITALS
OXYGEN SATURATION: 95 % | RESPIRATION RATE: 18 BRPM | SYSTOLIC BLOOD PRESSURE: 119 MMHG | HEART RATE: 89 BPM | DIASTOLIC BLOOD PRESSURE: 79 MMHG | BODY MASS INDEX: 28.25 KG/M2 | TEMPERATURE: 97.9 F | WEIGHT: 196.9 LBS

## 2023-05-06 LAB
CALCIUM, IONIZED MEASURED: 1.1 MMOL/L (ref 1.11–1.3)
GLUCOSE BLDC GLUCOMTR-MCNC: 118 MG/DL (ref 70–99)
ION CA PH 7.4: 1.13 MMOL/L (ref 1.11–1.3)
MAGNESIUM SERPL-MCNC: 1.9 MG/DL (ref 1.7–2.3)
PH: 7.45 (ref 7.35–7.45)
PHOSPHATE SERPL-MCNC: 3.1 MG/DL (ref 2.5–4.5)
PLATELET # BLD AUTO: 178 10E3/UL (ref 150–450)
POTASSIUM SERPL-SCNC: 3.7 MMOL/L (ref 3.4–5.3)

## 2023-05-06 PROCEDURE — 83735 ASSAY OF MAGNESIUM: CPT | Performed by: PHYSICIAN ASSISTANT

## 2023-05-06 PROCEDURE — 84100 ASSAY OF PHOSPHORUS: CPT | Performed by: PHYSICIAN ASSISTANT

## 2023-05-06 PROCEDURE — 97535 SELF CARE MNGMENT TRAINING: CPT | Mod: GO

## 2023-05-06 PROCEDURE — 250N000013 HC RX MED GY IP 250 OP 250 PS 637: Performed by: PHYSICIAN ASSISTANT

## 2023-05-06 PROCEDURE — 36415 COLL VENOUS BLD VENIPUNCTURE: CPT | Performed by: PHYSICIAN ASSISTANT

## 2023-05-06 PROCEDURE — 250N000013 HC RX MED GY IP 250 OP 250 PS 637: Performed by: SURGERY

## 2023-05-06 PROCEDURE — 82330 ASSAY OF CALCIUM: CPT | Performed by: PHYSICIAN ASSISTANT

## 2023-05-06 PROCEDURE — 84132 ASSAY OF SERUM POTASSIUM: CPT | Performed by: PHYSICIAN ASSISTANT

## 2023-05-06 PROCEDURE — 85049 AUTOMATED PLATELET COUNT: CPT | Performed by: PHYSICIAN ASSISTANT

## 2023-05-06 PROCEDURE — 97110 THERAPEUTIC EXERCISES: CPT | Mod: GO

## 2023-05-06 PROCEDURE — 250N000011 HC RX IP 250 OP 636: Performed by: PHYSICIAN ASSISTANT

## 2023-05-06 RX ORDER — PANTOPRAZOLE SODIUM 40 MG/1
40 TABLET, DELAYED RELEASE ORAL
Qty: 30 TABLET | Refills: 0 | Status: SHIPPED | OUTPATIENT
Start: 2023-05-07 | End: 2023-06-26

## 2023-05-06 RX ORDER — METOPROLOL TARTRATE 25 MG/1
12.5 TABLET, FILM COATED ORAL 2 TIMES DAILY
Qty: 60 TABLET | Refills: 3 | Status: SHIPPED | OUTPATIENT
Start: 2023-05-06 | End: 2023-05-18 | Stop reason: ALTCHOICE

## 2023-05-06 RX ORDER — POTASSIUM CHLORIDE 1500 MG/1
20 TABLET, EXTENDED RELEASE ORAL ONCE
Status: COMPLETED | OUTPATIENT
Start: 2023-05-06 | End: 2023-05-06

## 2023-05-06 RX ORDER — AMOXICILLIN 250 MG
1 CAPSULE ORAL 2 TIMES DAILY
Qty: 14 TABLET | Refills: 0 | Status: SHIPPED | OUTPATIENT
Start: 2023-05-06 | End: 2023-06-26

## 2023-05-06 RX ORDER — ASPIRIN 81 MG/1
81 TABLET, CHEWABLE ORAL DAILY
COMMUNITY
Start: 2023-05-07

## 2023-05-06 RX ORDER — MAGNESIUM OXIDE 400 MG/1
400 TABLET ORAL EVERY 4 HOURS
Qty: 1 TABLET | Refills: 0 | Status: SHIPPED | OUTPATIENT
Start: 2023-05-06 | End: 2023-06-26

## 2023-05-06 RX ORDER — ACETAMINOPHEN 500 MG
500-1000 TABLET ORAL EVERY 6 HOURS PRN
COMMUNITY
Start: 2023-05-06

## 2023-05-06 RX ORDER — MAGNESIUM OXIDE 400 MG/1
400 TABLET ORAL EVERY 4 HOURS
Status: COMPLETED | OUTPATIENT
Start: 2023-05-06 | End: 2023-05-06

## 2023-05-06 RX ADMIN — PANTOPRAZOLE SODIUM 40 MG: 40 TABLET, DELAYED RELEASE ORAL at 06:07

## 2023-05-06 RX ADMIN — POTASSIUM CHLORIDE 20 MEQ: 1500 TABLET, EXTENDED RELEASE ORAL at 07:58

## 2023-05-06 RX ADMIN — ACETAMINOPHEN 975 MG: 325 TABLET ORAL at 06:05

## 2023-05-06 RX ADMIN — Medication 400 MG: at 07:58

## 2023-05-06 RX ADMIN — Medication 50 MCG: at 08:00

## 2023-05-06 RX ADMIN — Medication 400 MG: at 11:30

## 2023-05-06 RX ADMIN — FUROSEMIDE 20 MG: 10 INJECTION, SOLUTION INTRAMUSCULAR; INTRAVENOUS at 06:07

## 2023-05-06 RX ADMIN — POLYETHYLENE GLYCOL 3350 17 G: 17 POWDER, FOR SOLUTION ORAL at 08:01

## 2023-05-06 RX ADMIN — METOPROLOL TARTRATE 12.5 MG: 25 TABLET, FILM COATED ORAL at 10:21

## 2023-05-06 RX ADMIN — HEPARIN SODIUM 5000 UNITS: 10000 INJECTION, SOLUTION INTRAVENOUS; SUBCUTANEOUS at 06:05

## 2023-05-06 RX ADMIN — THERA TABS 1 TABLET: TAB at 08:00

## 2023-05-06 RX ADMIN — ASPIRIN 81 MG 81 MG: 81 TABLET ORAL at 08:00

## 2023-05-06 RX ADMIN — SENNOSIDES AND DOCUSATE SODIUM 1 TABLET: 50; 8.6 TABLET ORAL at 08:00

## 2023-05-06 RX ADMIN — LORATADINE 10 MG: 10 TABLET ORAL at 08:00

## 2023-05-06 ASSESSMENT — ACTIVITIES OF DAILY LIVING (ADL)
ADLS_ACUITY_SCORE: 24
ADLS_ACUITY_SCORE: 24
ADLS_ACUITY_SCORE: 22
ADLS_ACUITY_SCORE: 24

## 2023-05-06 NOTE — PLAN OF CARE
Problem: Plan of Care - These are the overarching goals to be used throughout the patient stay.    Goal: Readiness for Transition of Care  Outcome: Adequate for Care Transition     Pt is alert and oriented. Denied pain, SOB, and dizziness this shift. BP soft this AM, 107/76, per parameters, PO scheduled metoprolol was supposed to be held. Contacted CV surgery to clarify, metoprolol given per NP orders. Pt stated he had a BM x2. Shower and incision care completed. Surgical sites WNL. Gave scheduled PO magnesium at 1130am, per pharmacist, pt ok to take another PO dose 4 hours after. Discharge orders placed. Discharge education given, pt and wife verbalized understanding. Pt sent home with senna, metoprolol, magnesium, and protonix. Pt discharged from unit at 12pm with wife to home.

## 2023-05-06 NOTE — PLAN OF CARE
Problem: Cardiovascular Surgery  Goal: Improved Activity Tolerance  Outcome: Progressing  Goal: Effective Bowel Elimination  Outcome: Progressing  Goal: Optimal Cerebral Tissue Perfusion  Outcome: Progressing  Intervention: Protect and Optimize Cerebral Perfusion  Recent Flowsheet Documentation  Taken 5/5/2023 2300 by Hilda Lambert RN  Head of Bed (HOB) Positioning: HOB at 20-30 degrees  Goal: Fluid and Electrolyte Balance  Outcome: Progressing  Goal: Blood Glucose Level Within Targeted Range  Outcome: Progressing   Goal Outcome Evaluation:       sc  Patient Name: Guille Owusu   MRN: [id number]   Date of Admission: 5/2/2023    Procedure: Procedure(s):  Mitral valve repair with anesthesia transesophageal echocardiogram, left atrial appendage excision, epiaortic ultrasound    Post Op day #:4    Subjective (Patient focus/Primary Problem for shift): To have a good night sleep           Pain Goal 0 Pain Rating 1          Pain Medication/ Regime effective to reduce patient pain: on scheduled Tylenol    Objective (Physical assessment):           Rhythm: normal sinus rhythm          Bowel Activity: yes if Yes indicate when: 5/5          Bowel Medications: yes            Incision: healing well          Incentive Spirometry Q 1-2 hour when awake:  not applicable Volume: NA          Epicardial Pacing Wires:  not applicable            Patient Activity:           Up to chair for meals: not applicable          Ambulation with RN x2 (Not including CR): not applicable            Is patient in home clothes:no             Chest Tubes   Pleural: no Draining: not applicable               Suction: not applicable              Mediastinal: no Draining: not applicable               Suction: not applicable   Dressing Change Daily:no If No, why? Done 5/5                     Urinary Catheter: no           Preventative WOC consult (need MD order): not applicable       Assessment (Nursing primary shift focus): Pt stated he slept  well during the shift.. Pt verbalized feeling better today.    Plan (Patient Care Plan/focus): Bundle cares, provide rest periods in between cares, pain control      Hilda Lambert RN   5/6/2023   1:47 AM

## 2023-05-06 NOTE — PLAN OF CARE
Goal Outcome Evaluation:      sc  Patient Name: Guille Owusu   MRN: [id number]   Date of Admission: 5/2/2023    Procedure: Procedure(s):  Mitral valve repair with anesthesia transesophageal echocardiogram, left atrial appendage excision, epiaortic ultrasound    Post Op day #:3    Subjective (Patient focus/Primary Problem for shift): BM          Pain Goal0 Pain Rating1           Pain Medication/ Regime effective to reduce patient painTylenol    Objective (Physical assessment):           Rhythm: normal sinus rhythm            Bowel Activity: yes if Yes indicate when: 05/05/23          Bowel Medications: yes            Incision: covered          Incentive Spirometry Q 1-2 hour when awake:  yes Volume: 2000          Epicardial Pacing Wires:  no            Patient Activity:           Up to chair for meals: yes          Ambulation with RN x2 (Not including CR): yes            Is patient in home clothes:no             Chest Tubes   Pleural: no Draining: not applicable               Suction: no              Mediastinal: not applicable Draining: no               Suction: not applicable   Dressing Change Daily:no If No, why? Not applicable                     Urinary Catheter: no           Preventative WOC consult (need MD order): no       Assessment (Nursing primary shift focus): Pt is AOX4 and stated incisional pain rating of 1. NSR with HR in 80's-90's. Lung sounds are WDL. Incision sites are WDL. Discharge video shown to Pt and spouse, both verbalized understanding. Pt able to teach-back how to do incision care at home. He ambulated in the hallway twice. Tolerated activity well. He had BM this evening.     Plan (Patient Care Plan/focus): BM today. Continue using IS and flutter valve. Ambulate QID.      Yadira Phipps RN   5/5/2023   8:54 PM

## 2023-05-06 NOTE — PLAN OF CARE
Occupational Therapy Discharge Summary    Reason for therapy discharge:    Discharged to home with outpatient therapy.    Progress towards therapy goal(s). See goals on Care Plan in [facility] electronic health record for goal details.  Goals met    Therapy recommendation(s):    Continued therapy is recommended.  Rationale/Recommendations:  pt would benefit from outpatient cardiac rehab for further monitoring and exercise.

## 2023-05-06 NOTE — DISCHARGE SUMMARY
Cardiovascular Surgery Discharge Summary    Primary Care Physician:  Tony Horne  Discharge Provider: Juli Barnett PA-C  Admission Date: 5/2/2023  Admission Diagnoses: Mitral regurgitation [I34.0]  Discharge Date: 5/6/2023  Disposition: Home   Condition at Discharge: Good  Code Status: Full Code     Principal Diagnosis:   Mitral regurgitation s/p Mitral valve repair (Posterior leaflet triangular resection and 34 mm Medtronic CG Future annuloplasty ring) and Left atrial appendage excision.    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 Zeng  1.  Mitral valve repair (Posterior leaflet triangular resection and 34 mm Medtronic CG Future annuloplasty ring).   2.  Left atrial appendage excision.      Discharge Medications:      Review of your medicines      START taking      Dose / Directions   acetaminophen 500 MG tablet  Commonly known as: TYLENOL  Used for: S/P MVR (mitral valve repair)      Dose: 500-1,000 mg  Take 1-2 tablets (500-1,000 mg) by mouth every 6 hours as needed for mild pain  Refills: 0     aspirin 81 MG chewable tablet  Commonly known as: ASA  Used for: S/P MVR (mitral valve repair)      Dose: 81 mg  Start taking on: May 7, 2023  Take 1 tablet (81 mg) by mouth daily  Refills: 0     magnesium oxide 400 MG tablet  Commonly known as: MAG-OX  Used for: S/P MVR (mitral valve repair)      Dose: 400 mg  Take 1 tablet (400 mg) by mouth every 4 hours  Quantity: 1 tablet  Refills: 0     metoprolol tartrate 25 MG tablet  Commonly known as:  LOPRESSOR  Used for: S/P MVR (mitral valve repair)      Dose: 12.5 mg  Take 0.5 tablets (12.5 mg) by mouth 2 times daily  Quantity: 60 tablet  Refills: 3     pantoprazole 40 MG EC tablet  Commonly known as: PROTONIX  Used for: S/P MVR (mitral valve repair)      Dose: 40 mg  Start taking on: May 7, 2023  Take 1 tablet (40 mg) by mouth daily before breakfast  Quantity: 30 tablet  Refills: 0     senna-docusate 8.6-50 MG tablet  Commonly known as: SENOKOT-S/PERICOLACE  Used for: S/P MVR (mitral valve repair)      Dose: 1 tablet  1 tablet by Oral or Feeding Tube route 2 times daily  Quantity: 14 tablet  Refills: 0        CONTINUE these medicines which have NOT CHANGED      Dose / Directions   albuterol 108 (90 Base) MCG/ACT inhaler  Commonly known as: PROAIR HFA/PROVENTIL HFA/VENTOLIN HFA      Dose: 1 puff  Inhale 1 puff into the lungs as needed  Refills: 0     CareTouch CPAP & BIPAP Hose Misc      (CUSTOM Rx) C-PAP MACHINE  Refills: 0     cholecalciferol 10 mcg (400 units) Tabs tablet  Commonly known as: VITAMIN D3      Dose: 50 mcg  Take 50 mcg by mouth daily  Refills: 0     EPINEPHrine 0.3 MG/0.3ML injection 2-pack  Commonly known as: ANY BX GENERIC EQUIV      0.3 mg intramuscularly 1X as directed, 2 of the 2 pack  Refills: 0     fish oil-omega-3 fatty acids 1000 MG capsule      Dose: 2,000 mg  Take 2,000 mg by mouth daily  Refills: 0     ibuprofen 200 MG tablet  Commonly known as: ADVIL/MOTRIN      Dose: 200-400 mg  Take 200-400 mg by mouth every 6 hours as needed  Refills: 0     loratadine 10 MG tablet  Commonly known as: CLARITIN      1 tab(s) orally once a day  Refills: 0     multivitamin, therapeutic Tabs tablet      Dose: 1 tablet  Take 1 tablet by mouth daily  Refills: 0     rizatriptan 10 MG tablet  Commonly known as: MAXALT      1 tab(s) orally may repeat once in 24 hr, no sooner than 2 hr after the first for migraine, 3 month supply  Refills: 0           Where to get your medicines      These medications  were sent to [place] [id number]  [address] [place] [address] [address], [facility] [address]    Phone: [phone]     magnesium oxide 400 MG tablet    metoprolol tartrate 25 MG tablet    pantoprazole 40 MG EC tablet    senna-docusate 8.6-50 MG tablet     Some of these will need a paper prescription and others can be bought over the counter. Ask your nurse if you have questions.    You don't need a prescription for these medications    acetaminophen 500 MG tablet    aspirin 81 MG chewable tablet         Discharge Instructions:    Follow up appointment with Primary Care Physician: Tony Horne within 7 days of discharge.  Follow up appointment with Specialist:    Follow with CV Surgery as scheduled. 6/13 at 12:00   Follow-up with cardiology as scheduled with Dr Ram 6/26 at 3:50    Diet: Cardiac    Activity/Restrictions: As tolerated with sternal precautions in mind (see below). No driving for 4 weeks or while on pain medication.     - Shower and wash your incisions daily with soap and water. No tub baths/hot tubs for 4 weeks. An antibacterial soap such as Dial or Safeguard is recommended.    - Check your incisions every day. If you notice any redness, drainage, or anything unusual, please call the surgeons office.    - No driving for 4 weeks after surgery or while on pain medication     - If you have had a valve repair or replacement, check with your cardiologist regarding the need for antibiotics before dental visits or other medical procedures.    - Do not lift anything more than 10 pounds for 6 weeks after surgery. After 6 weeks, advance lifting is tolerated.    - You may have watery drainage from your chest tube site for 2-3 weeks after surgery. Your may cover with a Band-Aid to protect your clothing. Remove the Band-Aid every day and wash the site.    - If you have a leg lesion, you may have swelling for 2-3 months. Elevate your leg any time you are not  "walking.    - If you feel any \"popping\" or \"clicking\" sensations in your chest, your arms are out too far or you are putting too much weight into arm movements. Do not reach over your head or out to the side to pull something. Do not do any arm exercises or use any exercise equipment that involves arm movement. If you feel your sternum moving, call the surgeon's office.    - Increase your daily activity as explained by Cardiac Rehab. You are encouraged to enroll in an Outpatient Cardiac Rehab Program.    - No active sports using your upper arms for 3 months. This includes fishing, hunting, bowling, swimming, tennis or golf.    - No physical activity such as cutting the grass, raking, vacuuming, changing sheets on your bed, snow shoveling, or using a  for 3 months.    - Use incentive spirometer 6-8 times per day for 2 weeks.       Hospital Summary:   Guille Owusu is a 63 year old male who was admitted to [facility] on 5/2/2023 with severe mitral regurgitation and dyspnea. Coronary angiography demonstrates no obstructive coronary artery disease. He was referred to CV surgery for evaluation for mitral valve repair.     Patient was deemed a candidate for mitral valve surgery, and was taken to the operating room on 5/2/23 where patient underwent mitral valve repair (Posterior leaflet triangular resection and 34 mm Medtronic CG Future annuloplasty ring) and left atrial appendage excision. Surgery was uneventful and patient was brought to the ICU post-operatively. He was extubated on POD#0 and weaned from pressors. Patient was awake and alert, afebrile, and with stable vitals. Insulin drip was discontinued and he was transitioned to a sliding scale. He was transferred to general telemetry status on POD#1 where patient has had return of bowel function, is maintaining oxygen saturations on room air, had his chest tubes removed, and has no complaints of chest pain or shortness of breath. On 05/06/23, " "patient was stable enough to be discharged to home.    Of note, he was euvolemic at discharge therefore no lasix was prescribed.    Vital signs:  Temp: 97.9  F (36.6  C) Temp src: Oral BP: 107/76 Pulse: 89   Resp: 18 SpO2: 95 % O2 Device: None (Room air) Oxygen Delivery: 2 LPM   Weight: 89.3 kg (196 lb 14.4 oz)  Estimated body mass index is 28.25 kg/m  as calculated from the following:    Height as of 4/21/23: 1.778 m (5' 10\").    Weight as of this encounter: 89.3 kg (196 lb 14.4 oz).      Physical Exam:    Pertinent exam findings on day of discharge include:  Gen: Seen up in chair. NAD. Pleasant and conversant.   CV: RRR on monitor. No edema.  Pulm: Non-labored breathing on room air.  Abd: Soft, non-tender, non-distended  Neuro: CNs grossly intact  Inc: C/D/I    Juli Barnett PA-C  Cardiothoracic Surgery  [phone]        "

## 2023-05-06 NOTE — PROGRESS NOTES
Care Management Discharge Note    Discharge Date: 05/06/2023       Discharge Disposition:  Home    Discharge Services:  Outpatient Cardiac Rehab    Discharge DME:  None    Discharge Transportation: family or friend will provide    Private pay costs discussed: Not applicable    Does the patient's insurance plan have a 3 day qualifying hospital stay waiver?  No    Education Provided on the Discharge Plan:  Yes  Persons Notified of Discharge Plans: MD, RN, SW, patient  Patient/Family in Agreement with the Plan:  Yes    Handoff Referral Completed: Yes    Additional Information:  Patient is discharging home with outpatient cardiac rehab. Wife to transport.         JAY GanW

## 2023-05-08 ENCOUNTER — TELEPHONE (OUTPATIENT)
Dept: CARDIOLOGY | Facility: CLINIC | Age: 63
End: 2023-05-08
Payer: COMMERCIAL

## 2023-05-10 ENCOUNTER — TELEPHONE (OUTPATIENT)
Dept: CARDIOLOGY | Facility: CLINIC | Age: 63
End: 2023-05-10
Payer: COMMERCIAL

## 2023-05-10 NOTE — TELEPHONE ENCOUNTER
Cardiovascular Surgery RN Phone Call:  Call made on May 10, 2023 at 12:11 PM by Evelyn Santo RN     Reason for call: Unable to sleep well at night     Symptom or request: only sleeping 2-4 hours at night     Instructions given to patient: Advised patient to try taking a dose of oxycodone prior to bedtime to see if that helps him sleep longer. Patient is also seeing his PCP tomorrow for follow up. He starts cardiac rehab on Monday, 5/15. Melatonin and Z-quil suggested as other options patient may try to assist in his sleep. Patient agreed, and will contact his PCP if further follow up is needed.     Evelyn Santo RN on May 10, 2023 at 12:11 PM

## 2023-05-15 ENCOUNTER — TELEPHONE (OUTPATIENT)
Dept: CARDIOLOGY | Facility: CLINIC | Age: 63
End: 2023-05-15
Payer: COMMERCIAL

## 2023-05-15 NOTE — TELEPHONE ENCOUNTER
Patient called in stating he has increased sensitivity on either side of his sternal incision. Advised him to try using over the counter lidocaine patches, and he will do so.    Patient sent in picture to CV RN email and incision is clean, dry, and intact. Patient also states taking an oxycodone prior to bed is helping him get more rest. All questions/concerns addressed.

## 2023-05-17 ENCOUNTER — TELEPHONE (OUTPATIENT)
Dept: CARDIOLOGY | Facility: CLINIC | Age: 63
End: 2023-05-17
Payer: COMMERCIAL

## 2023-05-17 ENCOUNTER — HOSPITAL ENCOUNTER (OUTPATIENT)
Dept: CARDIAC REHAB | Facility: CLINIC | Age: 63
Discharge: HOME | End: 2023-05-17
Attending: PHYSICIAN ASSISTANT
Payer: COMMERCIAL

## 2023-05-17 DIAGNOSIS — G89.18 ACUTE POST-OPERATIVE PAIN: Primary | ICD-10-CM

## 2023-05-17 DIAGNOSIS — Z98.890 S/P MVR (MITRAL VALVE REPAIR): ICD-10-CM

## 2023-05-17 PROCEDURE — 93798 PHYS/QHP OP CAR RHAB W/ECG: CPT

## 2023-05-17 PROCEDURE — 93797 PHYS/QHP OP CAR RHAB WO ECG: CPT

## 2023-05-17 RX ORDER — OXYCODONE HYDROCHLORIDE 5 MG/1
5 TABLET ORAL DAILY PRN
Qty: 15 TABLET | Refills: 0 | Status: SHIPPED | OUTPATIENT
Start: 2023-05-17 | End: 2023-05-20

## 2023-05-17 NOTE — TELEPHONE ENCOUNTER
Called patient and left  advising him oxycodone prescription was sent to [facility] in [place].    CV RN contact info provided.    ----- Message from Fabby Bowers PA-C sent at 5/17/2023  8:44 AM CDT -----    Done. Sent to the pharmacy on [address]!    ----- Message -----  From: Evelyn Santo RN  Sent: 5/17/2023   7:47 AM CDT  To: Fabby Bowers PA-C    Morning,    Juli discharged this pt without oxy because he actually had some left over after his hip surgery. He has been taking one tab at night to help him sleep and rest comfortably and would like some additional tabs if that is ok.    Thank you,  Padmini

## 2023-05-18 ENCOUNTER — HOSPITAL ENCOUNTER (OUTPATIENT)
Dept: CARDIAC REHAB | Facility: CLINIC | Age: 63
Discharge: HOME | End: 2023-05-18
Attending: INTERNAL MEDICINE
Payer: COMMERCIAL

## 2023-05-18 ENCOUNTER — TELEPHONE (OUTPATIENT)
Dept: CARDIOLOGY | Facility: CLINIC | Age: 63
End: 2023-05-18

## 2023-05-18 DIAGNOSIS — I34.0 MITRAL REGURGITATION: Primary | ICD-10-CM

## 2023-05-18 DIAGNOSIS — I34.0 MITRAL VALVE INSUFFICIENCY: ICD-10-CM

## 2023-05-18 PROCEDURE — 93798 PHYS/QHP OP CAR RHAB W/ECG: CPT

## 2023-05-18 RX ORDER — METOPROLOL SUCCINATE 25 MG/1
12.5 TABLET, EXTENDED RELEASE ORAL DAILY
Qty: 30 TABLET | Refills: 1 | Status: SHIPPED | OUTPATIENT
Start: 2023-05-18 | End: 2023-07-10

## 2023-05-18 RX ORDER — METOPROLOL SUCCINATE 25 MG/1
25 TABLET, EXTENDED RELEASE ORAL DAILY
Qty: 30 TABLET | Refills: 3 | Status: SHIPPED | OUTPATIENT
Start: 2023-05-18 | End: 2023-05-18 | Stop reason: DRUGHIGH

## 2023-05-18 NOTE — TELEPHONE ENCOUNTER
Spoke to Pt regarding recommendations from Dr. Ram. Pt is in agreement with plan. Will obtain labs at rehab visit. New rx of metoprol succinate 12.5 mg daily sent to pharmacy. Pt will continue to monitor symptoms and notify of any new or worsening.     Pt also wanted Dr. Ram to be aware the symptoms of lightheadedness and dizziness described are considered mild and not altogether inhibiting. Reviewed BP  As well with Pt. Will notify provider of Pt's request. No further questions or concerns noted=BOBBI

## 2023-05-18 NOTE — TELEPHONE ENCOUNTER
----- Message from Armando Ram MD sent at 5/18/2023 10:32 AM CDT -----  Regarding: RE: FYI: Lightheadedness/near syncopal concerns and in CR PVC's- pt is symptomatic s/p MVr 5/2/23  Mohini,  Would switch him to 12.5 mg once daily of long-acting metoprolol and discontinue the 12.5 twice daily of short acting metoprolol.  Could he also get a basic metabolic panel and magnesium when he comes in for rehab next.    Thanks,    Armando  ----- Message -----  From: Elizabeth Quinteros EP  Sent: 5/18/2023   8:16 AM CDT  To: Armando Ram MD  Subject: FYI: Lightheadedness/near syncopal concerns #    Our mutual patient Dank B s/p MVr on 5/2/23.     Pt does have intermittent vs constant ligthheadedness with worseing with standing up positions he reports since Mvr.  Pt has had a few near syncopal episodes at home that are concering. So we will watch him closely in CR. Today seated BP was 98/70, Orthostatic BP:122/70. His baseline lightheadedness is a 1/10 today but got worse after exercise and increased ectopy was noted when pt exercising and stopped from ex and had to sit down right away. Note his BP dropped with ex from 122/70 to 108-110/70 and it was hard to obtain with frequent PVC's     We will continue to watch for proper cv response with ex in CR.     On 6 MWT today pt walked 1900Feet =3.6mph =3.8 METs. Pt became more lightheaded 3/10 after walk and Bigeminy was noted on the monitor at this time. 100% O2 sats. It took several minutes to recover-watched pt 15 minutes until he felt at baseline of 1/10 lighthededness. /70 before leaving CR today. We will continue to watch pt EKG and BP's closely. Pt has f/u with Dr. Ram next month but may need to message concerns sooner. I will send an inLeukoDxet message to Dr. Ram about BP concerns that dropped with ex and concerns with symptomatic ectopy. Pt has no h/o arrythmias and he is s/p MVr/LAAE on 5/2/23 Pt plans to attend CR 2x/week.     Thank you for your   time    Elizabeth Garrido BS,EP   [facility] Cardiac Rehab  [phone]

## 2023-05-23 ENCOUNTER — LAB (OUTPATIENT)
Dept: LAB | Facility: CLINIC | Age: 63
End: 2023-05-23
Attending: INTERNAL MEDICINE
Payer: COMMERCIAL

## 2023-05-23 ENCOUNTER — HOSPITAL ENCOUNTER (OUTPATIENT)
Dept: CARDIAC REHAB | Facility: CLINIC | Age: 63
Discharge: HOME | End: 2023-05-23
Attending: INTERNAL MEDICINE
Payer: COMMERCIAL

## 2023-05-23 DIAGNOSIS — I34.0 MITRAL REGURGITATION: ICD-10-CM

## 2023-05-23 DIAGNOSIS — I34.0 MITRAL VALVE INSUFFICIENCY: ICD-10-CM

## 2023-05-23 LAB
ANION GAP SERPL CALCULATED.3IONS-SCNC: 13 MMOL/L (ref 7–15)
BUN SERPL-MCNC: 17.8 MG/DL (ref 8–23)
CALCIUM SERPL-MCNC: 9.4 MG/DL (ref 8.8–10.2)
CHLORIDE SERPL-SCNC: 103 MMOL/L (ref 98–107)
CREAT SERPL-MCNC: 1.14 MG/DL (ref 0.67–1.17)
DEPRECATED HCO3 PLAS-SCNC: 25 MMOL/L (ref 22–29)
GFR SERPL CREATININE-BSD FRML MDRD: 73 ML/MIN/1.73M2
GLUCOSE SERPL-MCNC: 116 MG/DL (ref 70–99)
MAGNESIUM SERPL-MCNC: 2 MG/DL (ref 1.7–2.3)
POTASSIUM SERPL-SCNC: 4 MMOL/L (ref 3.4–5.3)
SODIUM SERPL-SCNC: 141 MMOL/L (ref 136–145)

## 2023-05-23 PROCEDURE — 80048 BASIC METABOLIC PNL TOTAL CA: CPT

## 2023-05-23 PROCEDURE — 36415 COLL VENOUS BLD VENIPUNCTURE: CPT

## 2023-05-23 PROCEDURE — 83735 ASSAY OF MAGNESIUM: CPT

## 2023-05-23 PROCEDURE — 93798 PHYS/QHP OP CAR RHAB W/ECG: CPT

## 2023-05-25 ENCOUNTER — HOSPITAL ENCOUNTER (OUTPATIENT)
Dept: CARDIAC REHAB | Facility: CLINIC | Age: 63
Discharge: HOME | End: 2023-05-25
Attending: INTERNAL MEDICINE
Payer: COMMERCIAL

## 2023-05-25 PROCEDURE — 93798 PHYS/QHP OP CAR RHAB W/ECG: CPT

## 2023-05-26 DIAGNOSIS — G89.18 ACUTE POST-OPERATIVE PAIN: Primary | ICD-10-CM

## 2023-05-26 RX ORDER — OXYCODONE HYDROCHLORIDE 5 MG/1
5 TABLET ORAL DAILY PRN
Qty: 7 TABLET | Refills: 0 | Status: SHIPPED | OUTPATIENT
Start: 2023-05-26 | End: 2023-06-02

## 2023-05-30 ENCOUNTER — HOSPITAL ENCOUNTER (OUTPATIENT)
Dept: CARDIAC REHAB | Facility: CLINIC | Age: 63
Discharge: HOME | End: 2023-05-30
Attending: INTERNAL MEDICINE
Payer: COMMERCIAL

## 2023-05-30 PROCEDURE — 93798 PHYS/QHP OP CAR RHAB W/ECG: CPT

## 2023-06-01 ENCOUNTER — HOSPITAL ENCOUNTER (OUTPATIENT)
Dept: CARDIAC REHAB | Facility: CLINIC | Age: 63
Discharge: HOME | End: 2023-06-01
Attending: INTERNAL MEDICINE
Payer: COMMERCIAL

## 2023-06-01 PROCEDURE — 93798 PHYS/QHP OP CAR RHAB W/ECG: CPT

## 2023-06-06 ENCOUNTER — HOSPITAL ENCOUNTER (OUTPATIENT)
Dept: CARDIAC REHAB | Facility: CLINIC | Age: 63
Discharge: HOME | End: 2023-06-06
Attending: INTERNAL MEDICINE
Payer: COMMERCIAL

## 2023-06-06 PROCEDURE — 93798 PHYS/QHP OP CAR RHAB W/ECG: CPT

## 2023-06-08 ENCOUNTER — HOSPITAL ENCOUNTER (OUTPATIENT)
Dept: CARDIAC REHAB | Facility: CLINIC | Age: 63
Discharge: HOME | End: 2023-06-08
Attending: INTERNAL MEDICINE
Payer: COMMERCIAL

## 2023-06-08 PROCEDURE — 93798 PHYS/QHP OP CAR RHAB W/ECG: CPT

## 2023-06-13 ENCOUNTER — OFFICE VISIT (OUTPATIENT)
Dept: CARDIOLOGY | Facility: CLINIC | Age: 63
End: 2023-06-13
Payer: COMMERCIAL

## 2023-06-13 ENCOUNTER — HOSPITAL ENCOUNTER (OUTPATIENT)
Dept: CARDIAC REHAB | Facility: CLINIC | Age: 63
Discharge: HOME | End: 2023-06-13
Attending: INTERNAL MEDICINE
Payer: COMMERCIAL

## 2023-06-13 VITALS
HEART RATE: 87 BPM | BODY MASS INDEX: 28.7 KG/M2 | SYSTOLIC BLOOD PRESSURE: 98 MMHG | DIASTOLIC BLOOD PRESSURE: 68 MMHG | WEIGHT: 200 LBS | RESPIRATION RATE: 18 BRPM

## 2023-06-13 DIAGNOSIS — Z98.890 S/P MVR (MITRAL VALVE REPAIR): Primary | ICD-10-CM

## 2023-06-13 PROCEDURE — 93798 PHYS/QHP OP CAR RHAB W/ECG: CPT

## 2023-06-13 PROCEDURE — 99024 POSTOP FOLLOW-UP VISIT: CPT | Performed by: PHYSICIAN ASSISTANT

## 2023-06-13 NOTE — PATIENT INSTRUCTIONS
- Surgically doing well.  Incisions are healing well with no signs of infection.   - Hemodynamics are stable. No medication changes were needed today.  - Follow up with your cardiologist as scheduled (Dr. Ram on 06/26/2023)  - Continue Cardiac Rehab until completed.   - May start driving (4 weeks post-op) if not using narcotic pain medications.  - Continue strict sternal precautions until 06/13/2023. May gradually increase at this point (6 weeks post-op).   - No need for further follow-up with CV surgery unless concerns. Feel free to call our office with questions. [phone]

## 2023-06-13 NOTE — PROGRESS NOTES
CARDIOTHORACIC SURGERY FOLLOW-UP VISIT     Guille Owusu   1960   [id number]      Reason for visit: Post operative clinic visit. Patient underwent mitral valve repair and left atrial appendage excision with Dr. Espino on 05/02/2023.    HPI: Guille Owusu is a 63 year old year old male seen in clinic for a routine follow-up appointment after surgery. Patient has past medical history as below. Hospital course was unremarkable. Patient was discharged to home.    Patient has been doing overall well since discharge. Patient did follow-up with primary care since leaving the hospital. Reports that incision is healing well. Denies fevers, peripheral edema. Appetite is improving and patient is voiding without difficulty. Weight has been stable. Pain management at this point with occasional Tylenol. Patient has been attending cardiac rehab and this is going well. Patient is not on anti-coagulation.     PAST MEDICAL HISTORY:  Past Medical History:   Diagnosis Date     CHELSEY (obstructive sleep apnea)        PAST SURGICAL HISTORY:  Past Surgical History:   Procedure Laterality Date     AS REVISE TOTAL HIP REPLACEMENT Left 01/03/2023     CV CORONARY ANGIOGRAM N/A 4/21/2023    Procedure: Coronary Angiogram;  Surgeon: Javier Adames MD;  Location: [facility] CV     CV LEFT HEART CATH N/A 4/21/2023    Procedure: Left Heart Catheterization;  Surgeon: Javier Adames MD;  [facility]     MITRAL VALVE REPAIR N/A 5/2/2023    Procedure: Mitral valve repair with anesthesia transesophageal echocardiogram, left atrial appendage excision, epiaortic ultrasound;  Surgeon: Aniceto Zeng MD;  [place] [facility] [place]       CURRENT MEDICATIONS:     Current Outpatient Medications:      acetaminophen (TYLENOL) 500 MG tablet, Take 1-2 tablets (500-1,000 mg) by mouth every 6 hours as needed for mild pain, Disp: , Rfl:      albuterol (PROAIR HFA/PROVENTIL HFA/VENTOLIN HFA) 108 (90 Base) MCG/ACT  inhaler, Inhale 1 puff into the lungs as needed, Disp: , Rfl:      aspirin (ASA) 81 MG chewable tablet, Take 1 tablet (81 mg) by mouth daily, Disp: , Rfl:      cholecalciferol (VITAMIN D3) 10 mcg (400 units) TABS tablet, Take 50 mcg by mouth daily, Disp: , Rfl:      EPINEPHrine (ANY BX GENERIC EQUIV) 0.3 MG/0.3ML injection 2-pack, 0.3 mg intramuscularly 1X as directed, 2 of the 2 pack, Disp: , Rfl:      fish oil-omega-3 fatty acids 1000 MG capsule, Take 2,000 mg by mouth daily, Disp: , Rfl:      ibuprofen (ADVIL/MOTRIN) 200 MG tablet, Take 200-400 mg by mouth every 6 hours as needed, Disp: , Rfl:      loratadine (CLARITIN) 10 MG tablet, 1 tab(s) orally once a day, Disp: , Rfl:      magnesium oxide (MAG-OX) 400 MG tablet, Take 1 tablet (400 mg) by mouth every 4 hours, Disp: 1 tablet, Rfl: 0     metoprolol succinate ER (TOPROL XL) 25 MG 24 hr tablet, Take 0.5 tablets (12.5 mg) by mouth daily, Disp: 30 tablet, Rfl: 1     multivitamin, therapeutic (THERA-VIT) TABS tablet, Take 1 tablet by mouth daily, Disp: , Rfl:      pantoprazole (PROTONIX) 40 MG EC tablet, Take 1 tablet (40 mg) by mouth daily before breakfast, Disp: 30 tablet, Rfl: 0     Respiratory Therapy Supplies (CARETOUCH CPAP & BIPAP HOSE) MISC, (CUSTOM Rx) C-PAP MACHINE, Disp: , Rfl:      rizatriptan (MAXALT) 10 MG tablet, 1 tab(s) orally may repeat once in 24 hr, no sooner than 2 hr after the first for migraine, 3 month supply, Disp: , Rfl:      senna-docusate (SENOKOT-S/PERICOLACE) 8.6-50 MG tablet, 1 tablet by Oral or Feeding Tube route 2 times daily, Disp: 14 tablet, Rfl: 0    ALLERGIES:      Allergies   Allergen Reactions     Other Food Allergy Anaphylaxis     Fruits, cherries, apples, banana - almost everything except for melons and citrus     Gluten Meal Headache     Migraines     Horse-Derived Products Other (See Comments)     Other reaction(s): facial swelling  Facial swelling         ROS:  Gen: No fevers, weight loss/gain  CV: Denies chest pain,  peripheral edema  Pulm: Denies SOB  GI/: Voiding without problems, appetite improving.     LABS:  None    IMAGING:  None    PHYSICAL EXAM:   There were no vitals taken for this visit.  General: Alert and oriented, pleasant, no acute distress.  CV:  No peripheral edema.  Pulm: Easy work of breathing on room air.   GI: Soft, non-tender, and non-distended  Incision: Chest leg incisions clean dry and intact without erythema, swelling or drainage  Neuro: CNs grossly intact.      ASSESSMENT/PLAN:  Guille Owusu is a 63 year old year old male status post mitral valve repair who returns to clinic for postop visit.     - Surgically doing well.  Incisions are healing well with no signs of infection.   - Hemodynamics are stable. No medication changes were needed today.  - Follow up with your cardiologist as scheduled (Dr. Ram on 06/26/2023)  - Continue Cardiac Rehab until completed.   - May start driving (4 weeks post-op) if not using narcotic pain medications.  - Continue strict sternal precautions until 06/13/2023. May gradually increase at this point (6 weeks post-op).   - No need for further follow-up with CV surgery unless concerns. Feel free to call our office with questions. [phone]         _______  Fabby Bowers PA-C  Cardiothoracic Surgery  [phone]

## 2023-06-15 ENCOUNTER — HOSPITAL ENCOUNTER (OUTPATIENT)
Dept: CARDIAC REHAB | Facility: CLINIC | Age: 63
Discharge: HOME | End: 2023-06-15
Attending: INTERNAL MEDICINE
Payer: COMMERCIAL

## 2023-06-15 PROCEDURE — 93798 PHYS/QHP OP CAR RHAB W/ECG: CPT

## 2023-06-22 ENCOUNTER — HOSPITAL ENCOUNTER (OUTPATIENT)
Dept: CARDIAC REHAB | Facility: CLINIC | Age: 63
Discharge: HOME | End: 2023-06-22
Attending: INTERNAL MEDICINE
Payer: COMMERCIAL

## 2023-06-22 PROCEDURE — 93798 PHYS/QHP OP CAR RHAB W/ECG: CPT

## 2023-06-26 ENCOUNTER — OFFICE VISIT (OUTPATIENT)
Dept: CARDIOLOGY | Facility: CLINIC | Age: 63
End: 2023-06-26
Payer: COMMERCIAL

## 2023-06-26 VITALS
SYSTOLIC BLOOD PRESSURE: 112 MMHG | OXYGEN SATURATION: 98 % | BODY MASS INDEX: 28.98 KG/M2 | WEIGHT: 202 LBS | RESPIRATION RATE: 16 BRPM | HEART RATE: 78 BPM | DIASTOLIC BLOOD PRESSURE: 77 MMHG

## 2023-06-26 DIAGNOSIS — I34.0 NONRHEUMATIC MITRAL VALVE REGURGITATION: Primary | ICD-10-CM

## 2023-06-26 DIAGNOSIS — R00.0 WIDE-COMPLEX TACHYCARDIA: ICD-10-CM

## 2023-06-26 DIAGNOSIS — Z98.890 H/O MITRAL VALVE REPAIR: ICD-10-CM

## 2023-06-26 PROCEDURE — 99215 OFFICE O/P EST HI 40 MIN: CPT | Performed by: INTERNAL MEDICINE

## 2023-06-26 NOTE — LETTER
6/26/2023    RADHA BENDER  [address] [facility]] [id number]    RE: Guille Owusu       Dear Colleague,     I had the pleasure of seeing Guille Owusu in the [facility].      [facility]  [phone]          Assessment/Recommendations   Patient with recent mitral valve repair secondary to severe mitral insufficiency.  He is doing well postoperatively.  He has some orthostatic symptoms which seem to be improving with electrolyte solutions.  His functional capacity is improving although still has some shortness of breath and mild reduction of left lung sounds at the very base likely consistent with small residual pleural effusion.    I have not change any of his medications.  We will keep him on a very low-dose of beta-blocker for the first year postoperatively.  He will take an aspirin each day indefinitely.    He will continue cardiac rehab until its completion and plans on going back to work towards the end of July.    He has had some short runs of wide-complex tachycardia which may be aberrancy but I would like to get a 7-day event recorder to see if he is having any ambient rhythm disturbances of concern.  Electrolytes and magnesium were normal.    Thank you for allowing us to participate in his care.  40 minutes spent with chart review, patient visit, and documentation         History of Present Illness/Subjective    Mr. Guille Owusu is a 63 year old male with known mitral insufficiency which was recently diagnosed and the patient underwent mitral valve repair in early May 2023.  He has had a steady improvement postoperatively in his functional capacity and improvement in his breathing was almost immediate compared to what he was preoperatively.  He denies orthopnea, paroxysmal nocturnal dyspnea, peripheral edema, or syncopal episodes but does feel lightheaded at times when he stands up and this is improved after drinking some electrolyte solution every  day.  He does not have anginal type chest pain and he had minimal plaque in a obtuse marginal branch preoperatively.      Rhythm strips from rehab have been reviewed and do show wide-complex tachycardia but the rate is somewhat irregular making it suspicious that this is atrial with aberrancy but certainly cannot exclude a ventricular rhythm.       Physical Examination Review of Systems   /77 (BP Location: Right arm, Patient Position: Sitting, Cuff Size: Adult Regular)   Pulse 78   Resp 16   Wt 91.6 kg (202 lb)   SpO2 98%   BMI 28.98 kg/m    Body mass index is 28.98 kg/m .  Wt Readings from Last 3 Encounters:   06/26/23 91.6 kg (202 lb)   06/13/23 90.7 kg (200 lb)   05/06/23 89.3 kg (196 lb 14.4 oz)     General Appearance:   Alert, cooperative and in no acute distress.   ENT/Mouth: Pink/moist oral mucosa   EYES:  no scleral icterus, normal conjunctivae   Neck: JVP normal. No Hepatojugular reflux. Thyroid not visualized.   Chest/Lungs:   Lungs are clear to auscultation, equal chest wall expansion.   Cardiovascular:   S1, S2 without murmur ,clicks or rubs. Brachial, radial and posterior tibial pulses are intact and symetric. No carotid bruits noted   Abdomen:  Nontender.    Extremities: No cyanosis, clubbing or edema   Skin: no xanthelasma, warm.    Neurologic: normal arm movement bilateral, no tremors     Psychiatric: Appropriate affect.      Enc Vitals  BP: 112/77  Pulse: 78  Resp: 16  SpO2: 98 %  Weight: 91.6 kg (202 lb)                                           Medical History  Surgical History Family History Social History   Past Medical History:   Diagnosis Date    CHELSEY (obstructive sleep apnea)     Past Surgical History:   Procedure Laterality Date    AS REVISE TOTAL HIP REPLACEMENT Left 01/03/2023    CV CORONARY ANGIOGRAM N/A 4/21/2023    Procedure: Coronary Angiogram;  Surgeon: Javier Adames MD;  Location: [facility] CATH LAB CV    CV LEFT HEART CATH N/A 4/21/2023    Procedure: Left Heart  Catheterization;  Surgeon: Javier Adames MD;  Location: [facility] CATH LAB CV    MITRAL VALVE REPAIR N/A 5/2/2023    Procedure: Mitral valve repair with anesthesia transesophageal echocardiogram, left atrial appendage excision, epiaortic ultrasound;  Surgeon: Aniceto Zeng MD;  Location: [facility] Main OR    No family history on file. Social History     Socioeconomic History    Marital status:      Spouse name: Not on file    Number of children: Not on file    Years of education: Not on file    Highest education level: Not on file   Occupational History    Not on file   Tobacco Use    Smoking status: Never    Smokeless tobacco: Never   Vaping Use    Vaping Use: Never used   Substance and Sexual Activity    Alcohol use: Yes     Comment: socially    Drug use: Never    Sexual activity: Not Currently   Other Topics Concern    Not on file   Social History Narrative    Not on file     Social Determinants of Health     Financial Resource Strain: Not on file   Food Insecurity: Not on file   Transportation Needs: Not on file   Physical Activity: Not on file   Stress: Not on file   Social Connections: Not on file   Intimate Partner Violence: Not on file   Housing Stability: Not on file          Medications  Allergies   Current Outpatient Medications   Medication Sig Dispense Refill    acetaminophen (TYLENOL) 500 MG tablet Take 1-2 tablets (500-1,000 mg) by mouth every 6 hours as needed for mild pain      albuterol (PROAIR HFA/PROVENTIL HFA/VENTOLIN HFA) 108 (90 Base) MCG/ACT inhaler Inhale 1 puff into the lungs as needed      aspirin (ASA) 81 MG chewable tablet Take 1 tablet (81 mg) by mouth daily      cholecalciferol (VITAMIN D3) 10 mcg (400 units) TABS tablet Take 50 mcg by mouth daily      EPINEPHrine (ANY BX GENERIC EQUIV) 0.3 MG/0.3ML injection 2-pack 0.3 mg intramuscularly 1X as directed, 2 of the 2 pack      fish oil-omega-3 fatty acids 1000 MG capsule Take 2,000 mg by mouth daily      loratadine  (CLARITIN) 10 MG tablet 1 tab(s) orally once a day      metoprolol succinate ER (TOPROL XL) 25 MG 24 hr tablet Take 0.5 tablets (12.5 mg) by mouth daily 30 tablet 1    multivitamin, therapeutic (THERA-VIT) TABS tablet Take 1 tablet by mouth daily      Respiratory Therapy Supplies (CARETOUCH CPAP & BIPAP HOSE) MISC (CUSTOM Rx) C-PAP MACHINE      rizatriptan (MAXALT) 10 MG tablet 1 tab(s) orally may repeat once in 24 hr, no sooner than 2 hr after the first for migraine, 3 month supply      ibuprofen (ADVIL/MOTRIN) 200 MG tablet Take 200-400 mg by mouth every 6 hours as needed (Patient not taking: Reported on 6/13/2023)      Allergies   Allergen Reactions    Other Food Allergy Anaphylaxis     Fruits, cherries, apples, banana - almost everything except for melons and citrus    Gluten Meal Headache     Migraines    Horse-Derived Products Other (See Comments)     Other reaction(s): facial swelling  Facial swelling           Lab Results    Chemistry/lipid CBC Cardiac Enzymes/BNP/TSH/INR   Lab Results   Component Value Date    CHOL 167 12/10/2019    HDL 49 12/10/2019    TRIG 67 12/10/2019    BUN 17.8 05/23/2023     05/23/2023    CO2 25 05/23/2023    Lab Results   Component Value Date    WBC 14.4 (H) 05/03/2023    HGB 12.2 (L) 05/03/2023    HCT 35.7 (L) 05/03/2023    MCV 94 05/03/2023     05/06/2023    Lab Results   Component Value Date    INR 1.30 (H) 05/02/2023                  Thank you for allowing me to participate in the care of your patient.      Sincerely,     Armando Ram MD     [facility]  cc:   No referring provider defined for this encounter.

## 2023-06-26 NOTE — PROGRESS NOTES
[facility]  [phone]          Assessment/Recommendations   Patient with recent mitral valve repair secondary to severe mitral insufficiency.  He is doing well postoperatively.  He has some orthostatic symptoms which seem to be improving with electrolyte solutions.  His functional capacity is improving although still has some shortness of breath and mild reduction of left lung sounds at the very base likely consistent with small residual pleural effusion.    I have not change any of his medications.  We will keep him on a very low-dose of beta-blocker for the first year postoperatively.  He will take an aspirin each day indefinitely.    He will continue cardiac rehab until its completion and plans on going back to work towards the end of July.    He has had some short runs of wide-complex tachycardia which may be aberrancy but I would like to get a 7-day event recorder to see if he is having any ambient rhythm disturbances of concern.  Electrolytes and magnesium were normal.    Thank you for allowing us to participate in his care.  40 minutes spent with chart review, patient visit, and documentation         History of Present Illness/Subjective    Mr. Guille Owusu is a 63 year old male with known mitral insufficiency which was recently diagnosed and the patient underwent mitral valve repair in early May 2023.  He has had a steady improvement postoperatively in his functional capacity and improvement in his breathing was almost immediate compared to what he was preoperatively.  He denies orthopnea, paroxysmal nocturnal dyspnea, peripheral edema, or syncopal episodes but does feel lightheaded at times when he stands up and this is improved after drinking some electrolyte solution every day.  He does not have anginal type chest pain and he had minimal plaque in a obtuse marginal branch preoperatively.      Rhythm strips from rehab have been reviewed and do show wide-complex tachycardia  but the rate is somewhat irregular making it suspicious that this is atrial with aberrancy but certainly cannot exclude a ventricular rhythm.       Physical Examination Review of Systems   /77 (BP Location: Right arm, Patient Position: Sitting, Cuff Size: Adult Regular)   Pulse 78   Resp 16   Wt 91.6 kg (202 lb)   SpO2 98%   BMI 28.98 kg/m    Body mass index is 28.98 kg/m .  Wt Readings from Last 3 Encounters:   06/26/23 91.6 kg (202 lb)   06/13/23 90.7 kg (200 lb)   05/06/23 89.3 kg (196 lb 14.4 oz)     General Appearance:   Alert, cooperative and in no acute distress.   ENT/Mouth: Pink/moist oral mucosa   EYES:  no scleral icterus, normal conjunctivae   Neck: JVP normal. No Hepatojugular reflux. Thyroid not visualized.   Chest/Lungs:   Lungs are clear to auscultation, equal chest wall expansion.   Cardiovascular:   S1, S2 without murmur ,clicks or rubs. Brachial, radial and posterior tibial pulses are intact and symetric. No carotid bruits noted   Abdomen:  Nontender.    Extremities: No cyanosis, clubbing or edema   Skin: no xanthelasma, warm.    Neurologic: normal arm movement bilateral, no tremors     Psychiatric: Appropriate affect.      Enc Vitals  BP: 112/77  Pulse: 78  Resp: 16  SpO2: 98 %  Weight: 91.6 kg (202 lb)                                           Medical History  Surgical History Family History Social History   Past Medical History:   Diagnosis Date     CHELSEY (obstructive sleep apnea)     Past Surgical History:   Procedure Laterality Date     AS REVISE TOTAL HIP REPLACEMENT Left 01/03/2023     CV CORONARY ANGIOGRAM N/A 4/21/2023    Procedure: Coronary Angiogram;  Surgeon: Javier Adames MD;  Location: [facility] CATH [facility] CV     CV LEFT HEART CATH N/A 4/21/2023    Procedure: Left Heart Catheterization;  Surgeon: Javier Adames MD;  Location: [facility] CV     MITRAL VALVE REPAIR N/A 5/2/2023    Procedure: Mitral valve repair with anesthesia transesophageal echocardiogram, left  atrial appendage excision, epiaortic ultrasound;  Surgeon: Aniceto Zeng MD;  Location: [facility] Main OR    No family history on file. Social History     Socioeconomic History     Marital status:      Spouse name: Not on file     Number of children: Not on file     Years of education: Not on file     Highest education level: Not on file   Occupational History     Not on file   Tobacco Use     Smoking status: Never     Smokeless tobacco: Never   Vaping Use     Vaping Use: Never used   Substance and Sexual Activity     Alcohol use: Yes     Comment: socially     Drug use: Never     Sexual activity: Not Currently   Other Topics Concern     Not on file   Social History Narrative     Not on file     Social Determinants of Health     Financial Resource Strain: Not on file   Food Insecurity: Not on file   Transportation Needs: Not on file   Physical Activity: Not on file   Stress: Not on file   Social Connections: Not on file   Intimate Partner Violence: Not on file   Housing Stability: Not on file          Medications  Allergies   Current Outpatient Medications   Medication Sig Dispense Refill     acetaminophen (TYLENOL) 500 MG tablet Take 1-2 tablets (500-1,000 mg) by mouth every 6 hours as needed for mild pain       albuterol (PROAIR HFA/PROVENTIL HFA/VENTOLIN HFA) 108 (90 Base) MCG/ACT inhaler Inhale 1 puff into the lungs as needed       aspirin (ASA) 81 MG chewable tablet Take 1 tablet (81 mg) by mouth daily       cholecalciferol (VITAMIN D3) 10 mcg (400 units) TABS tablet Take 50 mcg by mouth daily       EPINEPHrine (ANY BX GENERIC EQUIV) 0.3 MG/0.3ML injection 2-pack 0.3 mg intramuscularly 1X as directed, 2 of the 2 pack       fish oil-omega-3 fatty acids 1000 MG capsule Take 2,000 mg by mouth daily       loratadine (CLARITIN) 10 MG tablet 1 tab(s) orally once a day       metoprolol succinate ER (TOPROL XL) 25 MG 24 hr tablet Take 0.5 tablets (12.5 mg) by mouth daily 30 tablet 1     multivitamin,  therapeutic (THERA-VIT) TABS tablet Take 1 tablet by mouth daily       Respiratory Therapy Supplies (CARETOUCH CPAP & BIPAP HOSE) MISC (CUSTOM Rx) C-PAP MACHINE       rizatriptan (MAXALT) 10 MG tablet 1 tab(s) orally may repeat once in 24 hr, no sooner than 2 hr after the first for migraine, 3 month supply       ibuprofen (ADVIL/MOTRIN) 200 MG tablet Take 200-400 mg by mouth every 6 hours as needed (Patient not taking: Reported on 6/13/2023)      Allergies   Allergen Reactions     Other Food Allergy Anaphylaxis     Fruits, cherries, apples, banana - almost everything except for melons and citrus     Gluten Meal Headache     Migraines     Horse-Derived Products Other (See Comments)     Other reaction(s): facial swelling  Facial swelling           Lab Results    Chemistry/lipid CBC Cardiac Enzymes/BNP/TSH/INR   Lab Results   Component Value Date    CHOL 167 12/10/2019    HDL 49 12/10/2019    TRIG 67 12/10/2019    BUN 17.8 05/23/2023     05/23/2023    CO2 25 05/23/2023    Lab Results   Component Value Date    WBC 14.4 (H) 05/03/2023    HGB 12.2 (L) 05/03/2023    HCT 35.7 (L) 05/03/2023    MCV 94 05/03/2023     05/06/2023    Lab Results   Component Value Date    INR 1.30 (H) 05/02/2023

## 2023-06-27 ENCOUNTER — HOSPITAL ENCOUNTER (OUTPATIENT)
Dept: CARDIAC REHAB | Facility: CLINIC | Age: 63
Discharge: HOME | End: 2023-06-27
Attending: INTERNAL MEDICINE
Payer: COMMERCIAL

## 2023-06-27 PROCEDURE — 93798 PHYS/QHP OP CAR RHAB W/ECG: CPT

## 2023-06-29 ENCOUNTER — HOSPITAL ENCOUNTER (OUTPATIENT)
Dept: CARDIAC REHAB | Facility: CLINIC | Age: 63
Discharge: HOME | End: 2023-06-29
Attending: INTERNAL MEDICINE
Payer: COMMERCIAL

## 2023-06-29 ENCOUNTER — HOSPITAL ENCOUNTER (OUTPATIENT)
Dept: CARDIOLOGY | Facility: CLINIC | Age: 63
Discharge: HOME | End: 2023-06-29
Attending: INTERNAL MEDICINE | Admitting: INTERNAL MEDICINE
Payer: COMMERCIAL

## 2023-06-29 DIAGNOSIS — I34.0 NONRHEUMATIC MITRAL VALVE REGURGITATION: ICD-10-CM

## 2023-06-29 DIAGNOSIS — R00.0 WIDE-COMPLEX TACHYCARDIA: ICD-10-CM

## 2023-06-29 DIAGNOSIS — Z98.890 H/O MITRAL VALVE REPAIR: ICD-10-CM

## 2023-06-29 PROCEDURE — 93270 REMOTE 30 DAY ECG REV/REPORT: CPT

## 2023-06-29 PROCEDURE — 93798 PHYS/QHP OP CAR RHAB W/ECG: CPT

## 2023-07-06 ENCOUNTER — HOSPITAL ENCOUNTER (OUTPATIENT)
Dept: CARDIAC REHAB | Facility: CLINIC | Age: 63
Discharge: HOME | End: 2023-07-06
Attending: INTERNAL MEDICINE
Payer: COMMERCIAL

## 2023-07-06 PROCEDURE — 93798 PHYS/QHP OP CAR RHAB W/ECG: CPT

## 2023-07-09 DIAGNOSIS — I34.0 MITRAL VALVE INSUFFICIENCY: ICD-10-CM

## 2023-07-09 DIAGNOSIS — I34.0 MITRAL REGURGITATION: ICD-10-CM

## 2023-07-10 RX ORDER — METOPROLOL SUCCINATE 25 MG/1
TABLET, EXTENDED RELEASE ORAL
Qty: 90 TABLET | Refills: 0 | Status: SHIPPED | OUTPATIENT
Start: 2023-07-10 | End: 2024-01-15

## 2023-07-11 ENCOUNTER — HOSPITAL ENCOUNTER (OUTPATIENT)
Dept: CARDIAC REHAB | Facility: CLINIC | Age: 63
Discharge: HOME | End: 2023-07-11
Attending: INTERNAL MEDICINE
Payer: COMMERCIAL

## 2023-07-11 PROCEDURE — 93272 ECG/REVIEW INTERPRET ONLY: CPT | Performed by: INTERNAL MEDICINE

## 2023-07-11 PROCEDURE — 93798 PHYS/QHP OP CAR RHAB W/ECG: CPT

## 2023-07-13 ENCOUNTER — HOSPITAL ENCOUNTER (OUTPATIENT)
Dept: CARDIAC REHAB | Facility: CLINIC | Age: 63
Discharge: HOME | End: 2023-07-13
Attending: INTERNAL MEDICINE
Payer: COMMERCIAL

## 2023-07-13 PROCEDURE — 93798 PHYS/QHP OP CAR RHAB W/ECG: CPT

## 2023-07-18 ENCOUNTER — HOSPITAL ENCOUNTER (OUTPATIENT)
Dept: CARDIAC REHAB | Facility: CLINIC | Age: 63
Discharge: HOME | End: 2023-07-18
Attending: INTERNAL MEDICINE
Payer: COMMERCIAL

## 2023-07-18 PROCEDURE — 93798 PHYS/QHP OP CAR RHAB W/ECG: CPT

## 2023-07-19 DIAGNOSIS — R00.0 WIDE-COMPLEX TACHYCARDIA: Primary | ICD-10-CM

## 2023-07-20 ENCOUNTER — HOSPITAL ENCOUNTER (OUTPATIENT)
Dept: CARDIAC REHAB | Facility: CLINIC | Age: 63
Discharge: HOME | End: 2023-07-20
Attending: INTERNAL MEDICINE
Payer: COMMERCIAL

## 2023-07-20 PROCEDURE — 93798 PHYS/QHP OP CAR RHAB W/ECG: CPT

## 2023-07-25 ENCOUNTER — OFFICE VISIT (OUTPATIENT)
Dept: CARDIOLOGY | Facility: CLINIC | Age: 63
End: 2023-07-25
Attending: INTERNAL MEDICINE
Payer: COMMERCIAL

## 2023-07-25 ENCOUNTER — HOSPITAL ENCOUNTER (OUTPATIENT)
Dept: CARDIAC REHAB | Facility: CLINIC | Age: 63
Discharge: HOME | End: 2023-07-25
Attending: INTERNAL MEDICINE
Payer: COMMERCIAL

## 2023-07-25 VITALS
BODY MASS INDEX: 29.06 KG/M2 | DIASTOLIC BLOOD PRESSURE: 66 MMHG | HEIGHT: 70 IN | HEART RATE: 73 BPM | WEIGHT: 203 LBS | SYSTOLIC BLOOD PRESSURE: 104 MMHG | RESPIRATION RATE: 16 BRPM

## 2023-07-25 DIAGNOSIS — I34.0 NONRHEUMATIC MITRAL VALVE REGURGITATION: Primary | ICD-10-CM

## 2023-07-25 DIAGNOSIS — R00.0 WIDE-COMPLEX TACHYCARDIA: ICD-10-CM

## 2023-07-25 PROCEDURE — 93798 PHYS/QHP OP CAR RHAB W/ECG: CPT

## 2023-07-25 PROCEDURE — 99204 OFFICE O/P NEW MOD 45 MIN: CPT | Performed by: INTERNAL MEDICINE

## 2023-07-25 NOTE — PROGRESS NOTES
HEART CARE ENCOUNTER CONSULTATON NOTE      [facility]  [phone]      Assessment/Recommendations   Assessment/Plan:    Guille Owusu is a very pleasant 63 year old male with PMH of severe MR due to prolapse of posterior mitral valve s/p mitral valve repair with Medtronic 34 mm annuloplasty ring and left atrial appendage excision in May 2023 who presents today to the EP clinic.    NSVT  - Asymptomatic episodes as of now  - Will get a cardiac MRI  - On Metoprolol succinate 12.5 mg currently, unable to up titrate it given orthostasis  - did discuss an ILR implant, will hold off till results of cardiac MRI are available      2. Orthostatic hypotension  - Counseled on hydration and exercises prior to getting up from a sitting or lying down position    Time spent: 45 minutes spent on the date of the encounter doing chart review, history and exam, documentation and further activities as noted above.         History of Present Illness/Subjective    HPI: Guille Owusu is a very pleasant 63 year old male with PMH of severe MR due to prolapse of posterior mitral valve s/p mitral valve repair with Medtronic 34 mm annuloplasty ring and left atrial appendage excision in May 2023 who presents today to the EP clinic.    Dank has recovered well from his mitral valve surgery. He says he is almost back to normal baseline post surgery. He was found to have some PVCs and short runs of wide complex beats suspicious for NSVT during his rehab. He was then recommended to undergo an event monitor which also showed one instance of 8 beats of wide complex rhythm, likely NSVT. He has been asymptomatic during these episodes. He does have orthostatic symptoms and has been diligent about his hydration. These episodes have since gotten better. No other episodes of light headedness or dizziness or syncope. No family history of sudden cardiac death.      Labs below reviewed personally     Physical Examination  Review of  "Systems   Vitals: /66 (BP Location: Left arm, Patient Position: Sitting, Cuff Size: Adult Large)   Pulse 73   Resp 16   Ht 1.778 m (5' 10\")   Wt 92.1 kg (203 lb)   BMI 29.13 kg/m    BMI= Body mass index is 29.13 kg/m .  Wt Readings from Last 3 Encounters:   07/25/23 92.1 kg (203 lb)   06/26/23 91.6 kg (202 lb)   06/13/23 90.7 kg (200 lb)       General Appearance:   no distress, normal body habitus   ENT/Mouth: membranes moist, no oral lesions or bleeding gums.      EYES:  no scleral icterus, normal conjunctivae   Neck: no carotid bruits or thyromegaly   Chest/Lungs:   lungs are clear to auscultation, no rales or wheezing, + sternal scar, equal chest wall expansion    Cardiovascular:   Regular. Normal first and second heart sounds with no murmurs, rubs, or gallops; the carotid, radial and posterior tibial pulses are intact, no edema bilaterally    Abdomen:  no organomegaly, masses, bruits, or tenderness; bowel sounds are present   Extremities: no cyanosis or clubbing   Skin: no xanthelasma, warm.    Neurologic: normal  bilateral, no tremors     Psychiatric: alert and oriented x3, calm        Please refer above for cardiac ROS details.        Medical History  Surgical History Family History Social History   Past Medical History:   Diagnosis Date    CHELSEY (obstructive sleep apnea)      Past Surgical History:   Procedure Laterality Date    AS REVISE TOTAL HIP REPLACEMENT Left 01/03/2023    CV CORONARY ANGIOGRAM N/A 4/21/2023    Procedure: Coronary Angiogram;  Surgeon: Javier Adames MD;  Location: [facility]    CV LEFT HEART CATH N/A 4/21/2023    Procedure: Left Heart Catheterization;  Surgeon: Javier Adames MD;  Location: [facility] CV    MITRAL VALVE REPAIR N/A 5/2/2023    Procedure: Mitral valve repair with anesthesia transesophageal echocardiogram, left atrial appendage excision, epiaortic ultrasound;  Surgeon: Aniceto Zeng MD;  Location: [facility] OR     No " family history on file.     Social History     Socioeconomic History    Marital status:      Spouse name: Not on file    Number of children: Not on file    Years of education: Not on file    Highest education level: Not on file   Occupational History    Not on file   Tobacco Use    Smoking status: Never    Smokeless tobacco: Never   Vaping Use    Vaping Use: Never used   Substance and Sexual Activity    Alcohol use: Yes     Comment: socially    Drug use: Never    Sexual activity: Not Currently   Other Topics Concern    Not on file   Social History Narrative    Not on file     Social Determinants of Health     Financial Resource Strain: Not on file   Food Insecurity: Not on file   Transportation Needs: Not on file   Physical Activity: Not on file   Stress: Not on file   Social Connections: Not on file   Intimate Partner Violence: Not on file   Housing Stability: Not on file           Medications  Allergies   Current Outpatient Medications   Medication Sig Dispense Refill    acetaminophen (TYLENOL) 500 MG tablet Take 1-2 tablets (500-1,000 mg) by mouth every 6 hours as needed for mild pain      albuterol (PROAIR HFA/PROVENTIL HFA/VENTOLIN HFA) 108 (90 Base) MCG/ACT inhaler Inhale 1 puff into the lungs as needed      aspirin (ASA) 81 MG chewable tablet Take 1 tablet (81 mg) by mouth daily      cholecalciferol (VITAMIN D3) 10 mcg (400 units) TABS tablet Take 50 mcg by mouth daily      EPINEPHrine (ANY BX GENERIC EQUIV) 0.3 MG/0.3ML injection 2-pack 0.3 mg intramuscularly 1X as directed, 2 of the 2 pack      fish oil-omega-3 fatty acids 1000 MG capsule Take 2,000 mg by mouth daily      loratadine (CLARITIN) 10 MG tablet 1 tab(s) orally once a day      metoprolol succinate ER (TOPROL XL) 25 MG 24 hr tablet TAKE 1/2 TABLET BY MOUTH DAILY 90 tablet 0    multivitamin, therapeutic (THERA-VIT) TABS tablet Take 1 tablet by mouth daily      Respiratory Therapy Supplies (CARETOUCH CPAP & BIPAP HOSE) MISC (CUSTOM Rx) C-PAP  MACHINE      rizatriptan (MAXALT) 10 MG tablet 1 tab(s) orally may repeat once in 24 hr, no sooner than 2 hr after the first for migraine, 3 month supply      ibuprofen (ADVIL/MOTRIN) 200 MG tablet Take 200-400 mg by mouth every 6 hours as needed (Patient not taking: Reported on 6/13/2023)         Allergies   Allergen Reactions    Other Food Allergy Anaphylaxis     Fruits, cherries, apples, banana - almost everything except for melons and citrus    Gluten Meal Headache     Migraines    Horse-Derived Products Other (See Comments)     Other reaction(s): facial swelling  Facial swelling            Lab Results    Chemistry/lipid CBC Cardiac Enzymes/BNP/TSH/INR   Recent Labs   Lab Test 12/10/19  1218   CHOL 167   HDL 49      TRIG 67     Recent Labs   Lab Test 12/10/19  1218 01/26/16  0733    108     Recent Labs   Lab Test 05/23/23  1416      POTASSIUM 4.0   CHLORIDE 103   CO2 25   *   BUN 17.8   CR 1.14   GFRESTIMATED 73   RY 9.4     Recent Labs   Lab Test 05/23/23  1416 05/04/23  0437 05/03/23  0413   CR 1.14 1.24* 1.01     Recent Labs   Lab Test 04/25/23  0831   A1C 5.4          Recent Labs   Lab Test 05/06/23  0513 05/03/23  0413   WBC  --  14.4*   HGB  --  12.2*   HCT  --  35.7*   MCV  --  94    153     Recent Labs   Lab Test 05/03/23  0413 05/02/23  1158 05/02/23  1055   HGB 12.2* 13.7 13.7    No results for input(s): TROPONINI in the last 39799 hours.  Recent Labs   Lab Test 04/16/23  1701   NTBNPI 143     No results for input(s): TSH in the last 87579 hours.  Recent Labs   Lab Test 05/02/23  1158 05/02/23  1052 04/25/23  0831   INR 1.30* 1.38* 1.03        Sommer Smith MD

## 2023-07-25 NOTE — PATIENT INSTRUCTIONS
[facility]  Cardiac Electrophysiology  [address]  [place] [id number]   Office: [phone]  Fax: [phone]       Thank you for seeing us in clinic today - it is a pleasure to be a part of your care team.  Below is a summary of our plan from today's visit.      Mitral valve prolapse and NSVT(non sustained ventricular tachycardia- fast heart rate from the bottom chamber of the heart)  - status post mitral valve surgery  - will schedule for a cardiac MRI   - next step to be decided based on results      Please do not hesitate to be in touch with our office at [phone] with any questions that may arise.      Thank you for trusting us with your care,    Sommer Smith MD  Clinical Cardiac Electrophysiology  [facility]  [address]  [place] [address]   Office: [phone]  Fax: [phone]

## 2023-07-25 NOTE — LETTER
7/25/2023    RADHA BENDER  [address] [facility] [address]    RE: Guille Owusu       Dear Colleague,     I had the pleasure of seeing Guille Owusu in the [facility].    HEART CARE ENCOUNTER CONSULTATON NOTE      [facility]  [phone]      Assessment/Recommendations   Assessment/Plan:    Guille Owusu is a very pleasant 63 year old male with PMH of severe MR due to prolapse of posterior mitral valve s/p mitral valve repair with Medtronic 34 mm annuloplasty ring and left atrial appendage excision in May 2023 who presents today to the EP clinic.    NSVT  - Asymptomatic episodes as of now  - Will get a cardiac MRI  - On Metoprolol succinate 12.5 mg currently, unable to up titrate it given orthostasis  - did discuss an ILR implant, will hold off till results of cardiac MRI are available      2. Orthostatic hypotension  - Counseled on hydration and exercises prior to getting up from a sitting or lying down position    Time spent: 45 minutes spent on the date of the encounter doing chart review, history and exam, documentation and further activities as noted above.         History of Present Illness/Subjective    HPI: Guille Owusu is a very pleasant 63 year old male with PMH of severe MR due to prolapse of posterior mitral valve s/p mitral valve repair with Medtronic 34 mm annuloplasty ring and left atrial appendage excision in May 2023 who presents today to the EP clinic.    Dank has recovered well from his mitral valve surgery. He says he is almost back to normal baseline post surgery. He was found to have some PVCs and short runs of wide complex beats suspicious for NSVT during his rehab. He was then recommended to undergo an event monitor which also showed one instance of 8 beats of wide complex rhythm, likely NSVT. He has been asymptomatic during these episodes. He does have orthostatic symptoms and has been diligent about his hydration. These episodes  "have since gotten better. No other episodes of light headedness or dizziness or syncope. No family history of sudden cardiac death.      Labs below reviewed personally     Physical Examination  Review of Systems   Vitals: /66 (BP Location: Left arm, Patient Position: Sitting, Cuff Size: Adult Large)   Pulse 73   Resp 16   Ht 1.778 m (5' 10\")   Wt 92.1 kg (203 lb)   BMI 29.13 kg/m    BMI= Body mass index is 29.13 kg/m .  Wt Readings from Last 3 Encounters:   07/25/23 92.1 kg (203 lb)   06/26/23 91.6 kg (202 lb)   06/13/23 90.7 kg (200 lb)       General Appearance:   no distress, normal body habitus   ENT/Mouth: membranes moist, no oral lesions or bleeding gums.      EYES:  no scleral icterus, normal conjunctivae   Neck: no carotid bruits or thyromegaly   Chest/Lungs:   lungs are clear to auscultation, no rales or wheezing, + sternal scar, equal chest wall expansion    Cardiovascular:   Regular. Normal first and second heart sounds with no murmurs, rubs, or gallops; the carotid, radial and posterior tibial pulses are intact, no edema bilaterally    Abdomen:  no organomegaly, masses, bruits, or tenderness; bowel sounds are present   Extremities: no cyanosis or clubbing   Skin: no xanthelasma, warm.    Neurologic: normal  bilateral, no tremors     Psychiatric: alert and oriented x3, calm        Please refer above for cardiac ROS details.        Medical History  Surgical History Family History Social History   Past Medical History:   Diagnosis Date    CHELSEY (obstructive sleep apnea)      Past Surgical History:   Procedure Laterality Date    AS REVISE TOTAL HIP REPLACEMENT Left 01/03/2023    CV CORONARY ANGIOGRAM N/A 4/21/2023    Procedure: Coronary Angiogram;  Surgeon: Javier Adames MD;  Location: [facility] CATH [facility] CV    CV LEFT HEART CATH N/A 4/21/2023    Procedure: Left Heart Catheterization;  Surgeon: Javier Adames MD;  Location: [facility] LAB CV    MITRAL VALVE REPAIR N/A 5/2/2023    " Procedure: Mitral valve repair with anesthesia transesophageal echocardiogram, left atrial appendage excision, epiaortic ultrasound;  Surgeon: Aniceto Zeng MD;  Location: [facility] Main OR     No family history on file.     Social History     Socioeconomic History    Marital status:      Spouse name: Not on file    Number of children: Not on file    Years of education: Not on file    Highest education level: Not on file   Occupational History    Not on file   Tobacco Use    Smoking status: Never    Smokeless tobacco: Never   Vaping Use    Vaping Use: Never used   Substance and Sexual Activity    Alcohol use: Yes     Comment: socially    Drug use: Never    Sexual activity: Not Currently   Other Topics Concern    Not on file   Social History Narrative    Not on file     Social Determinants of Health     Financial Resource Strain: Not on file   Food Insecurity: Not on file   Transportation Needs: Not on file   Physical Activity: Not on file   Stress: Not on file   Social Connections: Not on file   Intimate Partner Violence: Not on file   Housing Stability: Not on file           Medications  Allergies   Current Outpatient Medications   Medication Sig Dispense Refill    acetaminophen (TYLENOL) 500 MG tablet Take 1-2 tablets (500-1,000 mg) by mouth every 6 hours as needed for mild pain      albuterol (PROAIR HFA/PROVENTIL HFA/VENTOLIN HFA) 108 (90 Base) MCG/ACT inhaler Inhale 1 puff into the lungs as needed      aspirin (ASA) 81 MG chewable tablet Take 1 tablet (81 mg) by mouth daily      cholecalciferol (VITAMIN D3) 10 mcg (400 units) TABS tablet Take 50 mcg by mouth daily      EPINEPHrine (ANY BX GENERIC EQUIV) 0.3 MG/0.3ML injection 2-pack 0.3 mg intramuscularly 1X as directed, 2 of the 2 pack      fish oil-omega-3 fatty acids 1000 MG capsule Take 2,000 mg by mouth daily      loratadine (CLARITIN) 10 MG tablet 1 tab(s) orally once a day      metoprolol succinate ER (TOPROL XL) 25 MG 24 hr tablet  TAKE 1/2 TABLET BY MOUTH DAILY 90 tablet 0    multivitamin, therapeutic (THERA-VIT) TABS tablet Take 1 tablet by mouth daily      Respiratory Therapy Supplies (CARETOUCH CPAP & BIPAP HOSE) MISC (CUSTOM Rx) C-PAP MACHINE      rizatriptan (MAXALT) 10 MG tablet 1 tab(s) orally may repeat once in 24 hr, no sooner than 2 hr after the first for migraine, 3 month supply      ibuprofen (ADVIL/MOTRIN) 200 MG tablet Take 200-400 mg by mouth every 6 hours as needed (Patient not taking: Reported on 6/13/2023)         Allergies   Allergen Reactions    Other Food Allergy Anaphylaxis     Fruits, cherries, apples, banana - almost everything except for melons and citrus    Gluten Meal Headache     Migraines    Horse-Derived Products Other (See Comments)     Other reaction(s): facial swelling  Facial swelling            Lab Results    Chemistry/lipid CBC Cardiac Enzymes/BNP/TSH/INR   Recent Labs   Lab Test 12/10/19  1218   CHOL 167   HDL 49      TRIG 67     Recent Labs   Lab Test 12/10/19  1218 01/26/16  0733    108     Recent Labs   Lab Test 05/23/23  1416      POTASSIUM 4.0   CHLORIDE 103   CO2 25   *   BUN 17.8   CR 1.14   GFRESTIMATED 73   RY 9.4     Recent Labs   Lab Test 05/23/23  1416 05/04/23  0437 05/03/23  0413   CR 1.14 1.24* 1.01     Recent Labs   Lab Test 04/25/23  0831   A1C 5.4          Recent Labs   Lab Test 05/06/23  0513 05/03/23  0413   WBC  --  14.4*   HGB  --  12.2*   HCT  --  35.7*   MCV  --  94    153     Recent Labs   Lab Test 05/03/23  0413 05/02/23  1158 05/02/23  1055   HGB 12.2* 13.7 13.7    No results for input(s): TROPONINI in the last 81372 hours.  Recent Labs   Lab Test 04/16/23  1701   NTBNPI 143     No results for input(s): TSH in the last 78271 hours.  Recent Labs   Lab Test 05/02/23  1158 05/02/23  1052 04/25/23  0831   INR 1.30* 1.38* 1.03        Sommer Smith MD      Thank you for allowing me to participate in the care of your patient.      Sincerely,      Sommer Smith MD     [facility]  cc:   Armando Ram MD  [address]  [place] [address]

## 2023-07-27 ENCOUNTER — HOSPITAL ENCOUNTER (OUTPATIENT)
Dept: CARDIAC REHAB | Facility: CLINIC | Age: 63
Discharge: HOME | End: 2023-07-27
Attending: INTERNAL MEDICINE
Payer: COMMERCIAL

## 2023-07-27 PROCEDURE — 93798 PHYS/QHP OP CAR RHAB W/ECG: CPT

## 2023-08-01 ENCOUNTER — HOSPITAL ENCOUNTER (OUTPATIENT)
Dept: CARDIAC REHAB | Facility: CLINIC | Age: 63
Discharge: HOME | End: 2023-08-01
Attending: INTERNAL MEDICINE
Payer: COMMERCIAL

## 2023-08-01 PROCEDURE — 93798 PHYS/QHP OP CAR RHAB W/ECG: CPT

## 2023-08-03 ENCOUNTER — HOSPITAL ENCOUNTER (OUTPATIENT)
Dept: CARDIAC REHAB | Facility: CLINIC | Age: 63
Discharge: HOME | End: 2023-08-03
Attending: INTERNAL MEDICINE
Payer: COMMERCIAL

## 2023-08-03 PROCEDURE — 93798 PHYS/QHP OP CAR RHAB W/ECG: CPT

## 2023-08-08 ENCOUNTER — HOSPITAL ENCOUNTER (OUTPATIENT)
Dept: CARDIAC REHAB | Facility: CLINIC | Age: 63
Discharge: HOME | End: 2023-08-08
Attending: INTERNAL MEDICINE
Payer: COMMERCIAL

## 2023-08-08 PROCEDURE — 93798 PHYS/QHP OP CAR RHAB W/ECG: CPT

## 2023-08-10 ENCOUNTER — HOSPITAL ENCOUNTER (OUTPATIENT)
Dept: CARDIAC REHAB | Facility: CLINIC | Age: 63
Discharge: HOME | End: 2023-08-10
Attending: INTERNAL MEDICINE
Payer: COMMERCIAL

## 2023-08-10 PROCEDURE — 93798 PHYS/QHP OP CAR RHAB W/ECG: CPT

## 2023-08-15 ENCOUNTER — HOSPITAL ENCOUNTER (OUTPATIENT)
Dept: CARDIAC REHAB | Facility: CLINIC | Age: 63
Discharge: HOME | End: 2023-08-15
Attending: INTERNAL MEDICINE
Payer: COMMERCIAL

## 2023-08-15 PROCEDURE — 93798 PHYS/QHP OP CAR RHAB W/ECG: CPT

## 2023-08-22 ENCOUNTER — HOSPITAL ENCOUNTER (OUTPATIENT)
Dept: CARDIAC REHAB | Facility: CLINIC | Age: 63
Discharge: HOME | End: 2023-08-22
Attending: INTERNAL MEDICINE
Payer: COMMERCIAL

## 2023-08-22 PROCEDURE — 93798 PHYS/QHP OP CAR RHAB W/ECG: CPT

## 2023-08-25 ENCOUNTER — HOSPITAL ENCOUNTER (OUTPATIENT)
Dept: MRI IMAGING | Facility: CLINIC | Age: 63
Discharge: HOME | End: 2023-08-25
Attending: INTERNAL MEDICINE | Admitting: INTERNAL MEDICINE
Payer: COMMERCIAL

## 2023-08-25 DIAGNOSIS — I34.0 NONRHEUMATIC MITRAL VALVE REGURGITATION: ICD-10-CM

## 2023-08-25 DIAGNOSIS — R00.0 WIDE-COMPLEX TACHYCARDIA: ICD-10-CM

## 2023-08-25 PROCEDURE — 255N000002 HC RX 255 OP 636: Mod: JZ | Performed by: INTERNAL MEDICINE

## 2023-08-25 PROCEDURE — 75561 CARDIAC MRI FOR MORPH W/DYE: CPT | Mod: 26 | Performed by: INTERNAL MEDICINE

## 2023-08-25 PROCEDURE — A9585 GADOBUTROL INJECTION: HCPCS | Mod: JZ | Performed by: INTERNAL MEDICINE

## 2023-08-25 PROCEDURE — 75561 CARDIAC MRI FOR MORPH W/DYE: CPT

## 2023-08-25 RX ORDER — GADOBUTROL 604.72 MG/ML
10 INJECTION INTRAVENOUS ONCE
Status: COMPLETED | OUTPATIENT
Start: 2023-08-25 | End: 2023-08-25

## 2023-08-25 RX ADMIN — GADOBUTROL 10 ML: 604.72 INJECTION INTRAVENOUS at 08:46

## 2023-08-29 ENCOUNTER — HOSPITAL ENCOUNTER (OUTPATIENT)
Dept: CARDIAC REHAB | Facility: CLINIC | Age: 63
Discharge: HOME | End: 2023-08-29
Attending: INTERNAL MEDICINE
Payer: COMMERCIAL

## 2023-08-29 PROCEDURE — 93798 PHYS/QHP OP CAR RHAB W/ECG: CPT

## 2023-08-30 ENCOUNTER — HOSPITAL ENCOUNTER (OUTPATIENT)
Facility: HOSPITAL | Age: 63
End: 2023-08-30
Attending: INTERNAL MEDICINE | Admitting: INTERNAL MEDICINE
Payer: COMMERCIAL

## 2023-08-30 ENCOUNTER — PREP FOR PROCEDURE (OUTPATIENT)
Dept: CARDIOLOGY | Facility: CLINIC | Age: 63
End: 2023-08-30
Payer: COMMERCIAL

## 2023-08-30 ENCOUNTER — DOCUMENTATION ONLY (OUTPATIENT)
Dept: CARDIOLOGY | Facility: CLINIC | Age: 63
End: 2023-08-30
Payer: COMMERCIAL

## 2023-08-30 DIAGNOSIS — I47.29 NON-SUSTAINED VENTRICULAR TACHYCARDIA (H): ICD-10-CM

## 2023-08-30 DIAGNOSIS — I47.29 NON-SUSTAINED VENTRICULAR TACHYCARDIA (H): Primary | ICD-10-CM

## 2023-08-30 RX ORDER — FENTANYL CITRATE 50 UG/ML
25 INJECTION, SOLUTION INTRAMUSCULAR; INTRAVENOUS
Status: CANCELLED | OUTPATIENT
Start: 2023-08-30

## 2023-08-30 RX ORDER — LIDOCAINE 40 MG/G
CREAM TOPICAL
Status: CANCELLED | OUTPATIENT
Start: 2023-08-30

## 2023-08-30 NOTE — PROGRESS NOTES
H&P  PMD: []  Received [] Card OV: []  Date:  Teach  []   Orders  I [x] P  [x]  Letter []   AC: None- NA All other AM Meds: Take All   Sommer Smith MD  P [facility] Support [facility]  Kindly schedule Dank for an ILR  Lalo Sci      1960  Home:[phone] (home) Cell:[phone] (mobile)  Emergency Contact: Radha Owusu [phone]  PCP: Tony Horne, [phone]      Important patient information for CSC/Cath Lab staff : None    [facility] EP Cath Lab Procedure Order     Device Implant/Revision:  Procedure: New Implant  Current Device/Device Co Needed for Procedure: None Loop YR Free  Ordering Provider: Dr Smith  Date Ordered and Prepped: 8/30/2023 Olive Wasserman RN  Diagnosis:   Non sustained ventricular tachycardia   Scheduling Timeframe:  Next Available  Scheduling Restrictions: None  Scheduling Contact: Please contact pt to schedule, if you are unable to schedule date within the next 24 hours please contact pt to update on scheduling process  Cardiology Follow Up Apt s/p:  Standard Device follow up General Card @ 6mo (does not include New CRT/HIS)  Pre-Procedural Testing needed: None  Anesthesia:  None    [facility] EP Cath Lab Prep   H&P:  Pt to schedule with PMD to complete  Pre-Procedure Labs/T&S: For SICD & MICRA Devices only schedule lab visit at [facility] lab within 3 days prior to procedure for T&S, BMP, CBC, HcG is appropriate, and INR if on warfarin. All other Devices pre-procedure lab work will be done the morning of the procedure.  Medical Records Pertinent for Procedure:  ACT/Holter 6/29/23, CT/MRI 8/25/23, Echo 6/26/23, and EKG 5/3/23  Contrast Dye Allergies: None    Allergies   Allergen Reactions    Other Food Allergy Anaphylaxis     Fruits, cherries, apples, banana - almost everything except for melons and citrus    Gluten Meal Headache     Migraines    Horse-Derived Products Other (See Comments)     Other reaction(s): facial swelling  Facial swelling         Current Outpatient  Medications:     acetaminophen (TYLENOL) 500 MG tablet, Take 1-2 tablets (500-1,000 mg) by mouth every 6 hours as needed for mild pain, Disp: , Rfl:     albuterol (PROAIR HFA/PROVENTIL HFA/VENTOLIN HFA) 108 (90 Base) MCG/ACT inhaler, Inhale 1 puff into the lungs as needed, Disp: , Rfl:     aspirin (ASA) 81 MG chewable tablet, Take 1 tablet (81 mg) by mouth daily, Disp: , Rfl:     cholecalciferol (VITAMIN D3) 10 mcg (400 units) TABS tablet, Take 50 mcg by mouth daily, Disp: , Rfl:     EPINEPHrine (ANY BX GENERIC EQUIV) 0.3 MG/0.3ML injection 2-pack, 0.3 mg intramuscularly 1X as directed, 2 of the 2 pack, Disp: , Rfl:     fish oil-omega-3 fatty acids 1000 MG capsule, Take 2,000 mg by mouth daily, Disp: , Rfl:     ibuprofen (ADVIL/MOTRIN) 200 MG tablet, Take 200-400 mg by mouth every 6 hours as needed (Patient not taking: Reported on 6/13/2023), Disp: , Rfl:     loratadine (CLARITIN) 10 MG tablet, 1 tab(s) orally once a day, Disp: , Rfl:     metoprolol succinate ER (TOPROL XL) 25 MG 24 hr tablet, TAKE 1/2 TABLET BY MOUTH DAILY, Disp: 90 tablet, Rfl: 0    multivitamin, therapeutic (THERA-VIT) TABS tablet, Take 1 tablet by mouth daily, Disp: , Rfl:     Respiratory Therapy Supplies (CARETOUCH CPAP & BIPAP HOSE) MISC, (CUSTOM Rx) C-PAP MACHINE, Disp: , Rfl:     rizatriptan (MAXALT) 10 MG tablet, 1 tab(s) orally may repeat once in 24 hr, no sooner than 2 hr after the first for migraine, 3 month supply, Disp: , Rfl:     Documentation Date:8/30/2023 8:53 AM  Olive Wasserman RN

## 2023-08-31 ENCOUNTER — HOSPITAL ENCOUNTER (OUTPATIENT)
Dept: CARDIAC REHAB | Facility: CLINIC | Age: 63
Discharge: HOME | End: 2023-08-31
Attending: INTERNAL MEDICINE
Payer: COMMERCIAL

## 2023-08-31 PROCEDURE — 93798 PHYS/QHP OP CAR RHAB W/ECG: CPT

## 2023-09-06 ENCOUNTER — HOSPITAL ENCOUNTER (OUTPATIENT)
Dept: CARDIAC REHAB | Facility: CLINIC | Age: 63
Discharge: HOME | End: 2023-09-06
Attending: INTERNAL MEDICINE
Payer: COMMERCIAL

## 2023-09-06 PROCEDURE — 93798 PHYS/QHP OP CAR RHAB W/ECG: CPT

## 2023-09-07 ENCOUNTER — TELEPHONE (OUTPATIENT)
Dept: CARDIOLOGY | Facility: CLINIC | Age: 63
End: 2023-09-07
Payer: COMMERCIAL

## 2023-09-07 NOTE — TELEPHONE ENCOUNTER
[facility] Call Center    Phone Message    May a detailed message be left on voicemail: yes     Reason for Call: Other: Dank calling his insurance is denying the loop recorder implantation and would like to cancel this appt at this time. Please cancel and call the patient back to discuss next steps.      Action Taken: Other: cardiology    Travel Screening: Not Applicable  Thank you!  [facility]

## 2023-09-07 NOTE — TELEPHONE ENCOUNTER
Noted, message sent to our Insurance Verification team and Dr. Smith to see if he would like a ttip-sn-vqso scheduled or if we should order monitor first.    Will call pt back once we receive more guidance.    Clarisa

## 2023-09-07 NOTE — TELEPHONE ENCOUNTER
Peer to Peer is scheduled on 9/12/23 2-3pm  Postpones msg to 9/12/23 4pm to check on decision/approval  ILR is scheduled on 9/15  Will advise pt that we will contact him update prior to procedure date 9/15    1st Attempt to contact pt, voicemail message was left with contact information and instructing pt to call back.  9/7/2023 2:01 PM  Evelyn Steward RN

## 2023-09-08 NOTE — TELEPHONE ENCOUNTER
TAYLOR left from patient.  He reviewed his insurance won't cover the loop.  He cancelled his pre-op on his own.    He is unable to talk today and asked we follow up on Monday.  Will postpone message until Monday to follow up with him.  LUIS

## 2023-09-11 NOTE — TELEPHONE ENCOUNTER
PC back from patient.  Reviewed peer to peer scheduled.  Pt still prefers cancelling loop implant for now and reschedule pending peer to peer results.    Will update pt with these results.  Scheduling, please cancel loop, will reschedule in the future    Thank you!  Margie

## 2023-09-13 ENCOUNTER — TELEPHONE (OUTPATIENT)
Dept: CARDIOLOGY | Facility: CLINIC | Age: 63
End: 2023-09-13
Payer: COMMERCIAL

## 2023-09-13 ENCOUNTER — HOSPITAL ENCOUNTER (OUTPATIENT)
Dept: CARDIAC REHAB | Facility: CLINIC | Age: 63
Discharge: HOME | End: 2023-09-13
Attending: INTERNAL MEDICINE
Payer: COMMERCIAL

## 2023-09-13 DIAGNOSIS — I47.29 NON-SUSTAINED VENTRICULAR TACHYCARDIA (H): Primary | ICD-10-CM

## 2023-09-13 PROCEDURE — 93798 PHYS/QHP OP CAR RHAB W/ECG: CPT

## 2023-09-13 NOTE — TELEPHONE ENCOUNTER
Pc to patient.  Reviewed need for 2 week monitoring.  Pt is agreeable.  Order placed and scheduling notified.  LUIS

## 2023-09-13 NOTE — TELEPHONE ENCOUNTER
Sommer Smith MD Westlund, Jessica, RN  Well they insist on having 2 more weeks of monitor before getting an ILR. So lets do that          Previous Messages       ----- Message -----  From: Eveyln Johnson RN  Sent: 9/12/2023   4:05 PM CDT  To: Sommer Smith MD  Subject: FW: [employer] Denial Notification              Hi Dr. Smith,    How did the peer to peer go?  Thank you!  Margie  ----- Message -----  From: Evelyn Steward RN  Sent: 9/12/2023   4:00 PM CDT  To: [facility] Support Pool - [facility]  Subject: FW: Central PA Denial Notification              Posted to 9/12 at 4pm to check with Sarah status of peer to peer  ILR is on 9/15  Pt is aware we will be calling with decision  9/7/23 1:55 PM  Evelyn Steward RN      ----- Message -----  From: Rubio Greer  Sent: 9/7/2023   1:50 PM CDT  To: Sommer Smith MD; Jhonny Miranda RN; *  Subject: RE: Central PA Denial Notification              Allen Smith,    The peer to peer review is scheduled on 9/12 at 2:30-3pm, no lab cases are scheduled for you during that time.    The medical director will call you on your cell phone, and they also have my office number as a back-up.    This has been added to your outlook calendar, and the snapboard is also updated with this information.    Thank you,  Joellen    ----- Message -----  From: Sommer Smith MD  Sent: 9/7/2023  12:13 PM CDT  To: Evelyn Steward RN; Rubio Greer; *  Subject: RE: Central PA Denial Notification              Can you please coordinate with Joellen on this please  ----- Message -----  From: Evelyn Steward RN  Sent: 9/7/2023   9:00 AM CDT  To: Sommer Smith MD; Debra Lugo; *  Subject: RE: Central PA Denial Notification              Dr Smith-  Would you like to set up a Peer to Peer  The pts procedure is on 9/15  We can try and get it done prior to avoid rescheduling  Thanks  Padmini  ----- Message -----  From: Debra Lugo  Sent: 9/7/2023    8:48 AM CDT  To: Sommer Smith MD; Evelyn Steward RN  Subject: RE: Central PA Denial Notification              Allen Washington,    This case is in a denied status. The only way to possibly get this overturned is for a peer to peer. The clinical note from 7/25 with information regarding the NSVT was sent to insurance for review in the original auth request.    ThanksJoellen  ----- Message -----  From: Evelyn Steward RN  Sent: 9/7/2023   8:37 AM CDT  To: Sommer Smith MD; Debra Lugo  Subject: FW: Central PA Denial Notification              Debra-  Please see Dr Smith's response  Padmini  ----- Message -----  From: Sommer Smith MD  Sent: 9/6/2023   4:03 PM CDT  To: Evelyn Steward RN  Subject: RE: Central PA Denial Notification              My note says the patient had NSVT which is non sustained ventricular tachycardia which means the rhythm is coming from the bottom chambers of the heart. Is that not enough?  If it is not then sure order a 3 week monitor please!    ----- Message -----  From: Evelyn Steward RN  Sent: 9/6/2023   3:29 PM CDT  To: Sommer Smith MD  Subject: FW: Central PA Denial Notification              Dr Smith-  Pt was denies for ILR, see below  Do you want me to order a 3 wk monitor  Pt wore one for 7 days in June  Please advise  Thanks  Padmini

## 2023-09-18 ENCOUNTER — HOSPITAL ENCOUNTER (OUTPATIENT)
Dept: CARDIAC REHAB | Facility: CLINIC | Age: 63
Discharge: HOME | End: 2023-09-18
Attending: INTERNAL MEDICINE
Payer: COMMERCIAL

## 2023-09-18 PROCEDURE — 93798 PHYS/QHP OP CAR RHAB W/ECG: CPT

## 2023-09-21 ENCOUNTER — HOSPITAL ENCOUNTER (OUTPATIENT)
Dept: CARDIOLOGY | Facility: CLINIC | Age: 63
Discharge: HOME | End: 2023-09-21
Attending: INTERNAL MEDICINE | Admitting: INTERNAL MEDICINE
Payer: COMMERCIAL

## 2023-09-21 DIAGNOSIS — I47.29 NON-SUSTAINED VENTRICULAR TACHYCARDIA (H): ICD-10-CM

## 2023-09-21 PROCEDURE — 93270 REMOTE 30 DAY ECG REV/REPORT: CPT

## 2023-09-22 ENCOUNTER — HOSPITAL ENCOUNTER (OUTPATIENT)
Dept: CARDIAC REHAB | Facility: CLINIC | Age: 63
Discharge: HOME | End: 2023-09-22
Attending: INTERNAL MEDICINE
Payer: COMMERCIAL

## 2023-09-22 PROCEDURE — 93798 PHYS/QHP OP CAR RHAB W/ECG: CPT

## 2023-09-26 ENCOUNTER — HOSPITAL ENCOUNTER (OUTPATIENT)
Dept: CARDIAC REHAB | Facility: CLINIC | Age: 63
Discharge: HOME | End: 2023-09-26
Attending: INTERNAL MEDICINE
Payer: COMMERCIAL

## 2023-09-26 PROCEDURE — 93798 PHYS/QHP OP CAR RHAB W/ECG: CPT

## 2023-10-05 ENCOUNTER — HOSPITAL ENCOUNTER (OUTPATIENT)
Dept: CARDIAC REHAB | Facility: CLINIC | Age: 63
Discharge: HOME | End: 2023-10-05
Attending: INTERNAL MEDICINE
Payer: COMMERCIAL

## 2023-10-05 PROCEDURE — 93798 PHYS/QHP OP CAR RHAB W/ECG: CPT

## 2023-10-05 PROCEDURE — 93797 PHYS/QHP OP CAR RHAB WO ECG: CPT

## 2023-10-08 PROCEDURE — 93272 ECG/REVIEW INTERPRET ONLY: CPT | Performed by: INTERNAL MEDICINE

## 2023-10-11 ENCOUNTER — PREP FOR PROCEDURE (OUTPATIENT)
Dept: CARDIOLOGY | Facility: CLINIC | Age: 63
End: 2023-10-11
Payer: COMMERCIAL

## 2023-10-11 ENCOUNTER — DOCUMENTATION ONLY (OUTPATIENT)
Dept: CARDIOLOGY | Facility: CLINIC | Age: 63
End: 2023-10-11
Payer: COMMERCIAL

## 2023-10-11 DIAGNOSIS — R00.0 WIDE-COMPLEX TACHYCARDIA: ICD-10-CM

## 2023-10-11 DIAGNOSIS — I47.29 NON-SUSTAINED VENTRICULAR TACHYCARDIA (H): Primary | ICD-10-CM

## 2023-10-11 DIAGNOSIS — Z98.890 S/P MVR (MITRAL VALVE REPAIR): ICD-10-CM

## 2023-10-11 RX ORDER — FENTANYL CITRATE 50 UG/ML
25 INJECTION, SOLUTION INTRAMUSCULAR; INTRAVENOUS
Status: CANCELLED | OUTPATIENT
Start: 2023-11-10

## 2023-10-11 RX ORDER — LIDOCAINE 40 MG/G
CREAM TOPICAL
Status: CANCELLED | OUTPATIENT
Start: 2023-10-11

## 2023-10-11 RX ORDER — SODIUM CHLORIDE 9 MG/ML
100 INJECTION, SOLUTION INTRAVENOUS CONTINUOUS
Status: CANCELLED | OUTPATIENT
Start: 2023-11-10

## 2023-10-11 NOTE — PROGRESS NOTES
H&P  PMD: [x]  Received [] Card OV: []  Date:  Teach  []   Orders  I [x] P  [x]  Letter []   AC: None- NA Diuretics: None  DM Meds: None  GLP-1:None     1960  Home:[phone] (home) Cell:[phone] (mobile)  Emergency Contact: FrancoiseRadha [phone]  PCP: Tony Horne, [phone]      Important patient information for CSC/Cath Lab staff : None    [facility] EP Cath Lab Procedure Order     Device Implant/Revision:  Procedure: New Implant  Current Device/Device Co Needed for Procedure: Single Loop Medtronic  Ordering Provider: Dr Smith  Date Ordered and Prepped: 10/11/2023 Tamra Ram RN  Diagnosis:   Nonsustained VT, PVC  Scheduling Timeframe:  Next Available  Scheduling Restrictions: None  Scheduling Contact: Please contact pt to schedule, if you are unable to schedule date within the next 24 hours please contact pt to update on scheduling process  Cardiology Follow Up Apt s/p:  Standard Device follow up General Card @ 6mo (does not include New CRT/HIS)  Pre-Procedural Testing needed: None  Anesthesia:  None    [facility] EP Cath Lab Prep   H&P:  Pt to schedule with PMD to complete  Pre-Procedure Labs/T&S: For SICD & MICRA Devices only schedule lab visit at [facility] lab within 3 days prior to procedure for T&S, BMP, CBC, HcG is appropriate, and INR if on warfarin. All other Devices pre-procedure lab work will be done the morning of the procedure.  Medical Records Pertinent for Procedure:  ACT/Holter 9-21-23, CT/MRI MRI 8-25-23, Echo 6-26-23, and EKG 5-3-23 SR @ 86  Contrast Dye Allergies: None  GLP-1 Protocol: If on Dulaglutide (Trulicity) (weekly)- Injection hold 7 days prior to procedure  , Exenatide extended release (Bydureon bcise) (weekly)- Injection hold 7 days prior to procedure, Exenatide (Byetta) (twice daily)- Oral Tablet hold day prior and morning of procedure and for Injection hold 7 days prior to procedure, Semaglutide (Ozempic) (weekly)- Injection and Oral hold 7 days prior to procedure,  Liraglutide (Victoza, Saxenda) (daily)- Injection hold day prior and morning of procedure    Allergies   Allergen Reactions    Other Food Allergy Anaphylaxis     Fruits, cherries, apples, banana - almost everything except for melons and citrus    Gluten Meal Headache     Migraines    Horse-Derived Products Other (See Comments)     Other reaction(s): facial swelling  Facial swelling         Current Outpatient Medications:     acetaminophen (TYLENOL) 500 MG tablet, Take 1-2 tablets (500-1,000 mg) by mouth every 6 hours as needed for mild pain, Disp: , Rfl:     albuterol (PROAIR HFA/PROVENTIL HFA/VENTOLIN HFA) 108 (90 Base) MCG/ACT inhaler, Inhale 1 puff into the lungs as needed, Disp: , Rfl:     aspirin (ASA) 81 MG chewable tablet, Take 1 tablet (81 mg) by mouth daily, Disp: , Rfl:     cholecalciferol (VITAMIN D3) 10 mcg (400 units) TABS tablet, Take 50 mcg by mouth daily, Disp: , Rfl:     EPINEPHrine (ANY BX GENERIC EQUIV) 0.3 MG/0.3ML injection 2-pack, 0.3 mg intramuscularly 1X as directed, 2 of the 2 pack, Disp: , Rfl:     fish oil-omega-3 fatty acids 1000 MG capsule, Take 2,000 mg by mouth daily, Disp: , Rfl:     ibuprofen (ADVIL/MOTRIN) 200 MG tablet, Take 200-400 mg by mouth every 6 hours as needed (Patient not taking: Reported on 6/13/2023), Disp: , Rfl:     loratadine (CLARITIN) 10 MG tablet, 1 tab(s) orally once a day, Disp: , Rfl:     metoprolol succinate ER (TOPROL XL) 25 MG 24 hr tablet, TAKE 1/2 TABLET BY MOUTH DAILY, Disp: 90 tablet, Rfl: 0    multivitamin, therapeutic (THERA-VIT) TABS tablet, Take 1 tablet by mouth daily, Disp: , Rfl:     Respiratory Therapy Supplies (CARETOUCH CPAP & BIPAP HOSE) MISC, (CUSTOM Rx) C-PAP MACHINE, Disp: , Rfl:     rizatriptan (MAXALT) 10 MG tablet, 1 tab(s) orally may repeat once in 24 hr, no sooner than 2 hr after the first for migraine, 3 month supply, Disp: , Rfl:     Documentation Date:10/11/2023 12:40 PM  Tamra Ram RN

## 2023-10-14 ENCOUNTER — HEALTH MAINTENANCE LETTER (OUTPATIENT)
Age: 63
End: 2023-10-14

## 2023-10-16 ENCOUNTER — DOCUMENTATION ONLY (OUTPATIENT)
Dept: CARDIOLOGY | Facility: CLINIC | Age: 63
End: 2023-10-16
Payer: COMMERCIAL

## 2023-10-17 ENCOUNTER — TELEPHONE (OUTPATIENT)
Dept: CARDIOLOGY | Facility: CLINIC | Age: 63
End: 2023-10-17
Payer: COMMERCIAL

## 2023-10-17 NOTE — TELEPHONE ENCOUNTER
Aria Barnhart Jessica, RN  Thank you! I have faxed the appeal lettter/information to the [facility] urgent/expedited appeal fax number: [phone]          Previous Messages       ----- Message -----  From: Evelyn Johnson, HOLGER  Sent: 10/16/2023   3:40 PM CDT  To: Aria LEWIS Bronwyn  Subject: RE: Central PA Denial Notification              Letter is in the letter tab in epic  Thank you!  ----- Message -----  From: Aria Barnhart  Sent: 10/16/2023   8:13 AM CDT  To: Sommer Smith MD; *  Subject: RE: Central PA Denial Notification              Could we delegate a letter? I can assist with faxing all materials over the the appeals team.      ----- Message -----  From: Sommer Smith MD  Sent: 10/13/2023   4:23 PM CDT  To: Aria Barnhart; [facility] Ep Support Pool - [facility]  Subject: RE: Central PA Denial Notification              I am happy to sign a letter giving the facts  ----- Message -----  From: Aria Barnhart  Sent: 10/13/2023   3:13 PM CDT  To: Sommer Smith MD; *  Subject: RE: Central PA Denial Notification              That is frustrating. Would you be willing write a letter asking [facility] to reconsider their decision?      ----- Message -----  From: Sommer Smith MD  Sent: 10/13/2023   2:23 PM CDT  To: Aria Barnhart; [facility] Ep Support Pool - [facility]  Subject: RE: Central PA Denial Notification              As per the peer to peer I had I was asked to perform a 2 week monitor and was assured that the ILR would be approved after that.  ----- Message -----  From: Aria Barnhart  Sent: 10/13/2023   2:09 PM CDT  To: Sommer Smith MD; *  Subject: RE: Central PA Denial Notification              This was a strange situation.    I received a phone call from [facility] Rep Lisa stating she is going to cancel the original Case #[id number] and provider needs to follow appeal process from a previous denied cpt [id number] from back on 9/5/2023, Case #[id number] .  No peer to peer is available.   Next  steps would be to file an appeal.    ----- Message -----  From: Sommer Smith MD  Sent: 10/13/2023   1:44 PM CDT  To: Aria Barnhart; [facility]  Subject: RE: Central PA Denial Notification              Looks like my notes and the patients medical records were not reviewed by the Insurance team.  Kindly forward my notes and the event monitor results and let me know how it  goes  ----- Message -----  From: Aria Barnhart  Sent: 10/13/2023  11:12 AM CDT  To: Sommer Smith MD; *  Subject: Central PA Denial Notification                  Central PA Denial Notification     Patient Name:                        Guille Owusu  :                                       1960  MRN:                                       [id number]     Dr. Smith,     The authorization for procedure Loop Recorder Implant on date of service 11/10/2023 has been denied by the patient's insurance for the following reason:     Denial Reason: Your provider has requested coverage for a special heart monitor that is placed under the skin. You have palpitations. We reviewed the following:    Your provider's medical notes    Your health plan's benefit document (a listing of your health plan's benefits and what it  pays for)    Your health plan's medical policy regarding heart monitors.  We found that this request does not meet the following criteria:    We did not get notes that say you cannot wear a regular heart monitor.    We did not get notes that say you have worn a regular monitor for at least three (3) weeks.    We did not get notes that say you had a stroke that may have been caused by a certain kind of irregular heart rhythm.    We did not get notes that say your doctor thinks you have an irregular rhythm coming  from the bottom chambers of your heart    We did not get notes that say you are at risk for an irregular heartbeat due to a problem  with the structure of your heart.    We did not get notes  that say you have been passing out and your heart tracing (ECG) has certain abnormalities.    We did not get notes that say you have had other heart tests, such as a test of the electrical system or a tilt table test     Below is the information we provided to the patient's insurance company:     Order:                          Loop Recorder Implant  Visit Notes:                  Cardiology OV 7/25/23 & 6/26/23  Results:                       MRI 8/25/23, [facility], Echo & ECG 5/2/23  Other:                          Event monitor 6/29/23 & 9/21/23     An appeal can be filed for possible decision reversal. This can take up to 30 days for the insurance to process once submitted. If you wish to proceed with an appeal, please provide additional clinical records and a letter explaining why you feel this service is medically necessary.     Please let us know how you wish to proceed as soon as possible. We may contact the patient if we do not receive a response.     Thank you,     Aria Kim  Central Prior Authorization

## 2023-11-01 NOTE — TELEPHONE ENCOUNTER
Aria-  Have we heard back from pts insurance?  Pt procedure is on 11/10/23  Thanks  11/1/2023 1:20 PM  Evelyn Steward RN

## 2023-11-02 ENCOUNTER — TELEPHONE (OUTPATIENT)
Dept: CARDIOLOGY | Facility: CLINIC | Age: 63
End: 2023-11-02
Payer: COMMERCIAL

## 2023-11-02 NOTE — TELEPHONE ENCOUNTER
Please see additional PC note from today, pt is aware procedure was approved.  11/2/2023 9:26 AM  Evelyn Steward RN

## 2023-11-02 NOTE — TELEPHONE ENCOUNTER
Pre-Procedure Education    Procedure: ILR with Sabina Gonzalez NP on 11/10/23 with arrival time 6:00 am    COVID: COVID policy- if pt develops COVID like symptoms prior to procedure, he/she would need to complete an at home with a rapid antigen COVID test 1-2 days prior to your procedure date. If COVID + pt is aware the procedure will need to be rescheduled, and to contact CV scheduling as soon as possible    Pre-Op H&P: scheduled on 11/8/23 - Available in Epic    Education:   Contact: Reviewed via phone with pt  Pre-Procedure Instruction: NPO after midnight pre procedure, Defined NPO with pt, Remove all jewelry and leave all valuables at home, Shower prior to arrival, Sedation plan/orders, Transportation requirements and arrangements post procedure, Post-procedure follow up process, Post-procedure restrictions/expectations, and Pre-procedure letter sent- letter tab  Risks:  Implantable Loop Recorder Insertion  <1% for Infection  < 1% Bleeding, Hematoma (increased with anticoagulation therapy)   <1% generator malfunction     Medication:   Instructions regarding anticoagulants: Pt not on AC- N/A  Instructions regarding antiarrhythmic medication: None; N/A  Instructions given to pt regarding diuretics medication: None  Instructions given to pt regarding DM/GLP-1 medication:   DM- None  GLP-1- None  Instructions for medication, other than anticoagulants and antiarrhythmics listed above, given to pt: Take all medication AM of procedure with small sips of water     Important patient information for staff: None    11/2/2023 9:22 AM  Evelyn Steward RN

## 2023-11-02 NOTE — TELEPHONE ENCOUNTER
Aria Barnhart [facility] Support Pool - [facility]  Caller: Unspecified (2 weeks ago)  This procedure was approved!

## 2023-11-07 NOTE — H&P (VIEW-ONLY)
HEART CARE ENCOUNTER CONSULTATON NOTE      [facility]  [phone]      Assessment/Recommendations   Assessment/Plan:    Guille Owusu is a very pleasant 63 year old male with PMH of severe MR due to prolapse of posterior mitral valve s/p mitral valve repair with Medtronic 34 mm annuloplasty ring and left atrial appendage excision in May 2023 who presents today to the EP clinic.    NSVT  - Asymptomatic episodes as of now  - Cardiac MRI did not show significant scar burden(minimal fibrosis of the proximal papillary muscles)  - On Metoprolol succinate 12.5 mg currently, unable to up titrate it given orthostasis  - Event monitor showed isolated PVCs, NSVT was noted during cardiac rehab  - ILR was finally approved, will betting it on 11/10  - Will increase his physical activity level and check ILR in 3 months to decide on further increasing activity    2. Orthostatic hypotension  - Counseled on hydration and exercises prior to getting up from a sitting or lying down position  - Consider wearing compression stockings    Time spent: 30 minutes spent on the date of the encounter doing chart review, history and exam, documentation and further activities as noted above.         History of Present Illness/Subjective    HPI: Guille Owusu is a very pleasant 63 year old male with PMH of severe MR due to prolapse of posterior mitral valve s/p mitral valve repair with Medtronic 34 mm annuloplasty ring and left atrial appendage excision in May 2023 who presents today to the EP clinic.    July 2023    Dank has recovered well from his mitral valve surgery. He says he is almost back to normal baseline post surgery. He was found to have some PVCs and short runs of wide complex beats suspicious for NSVT during his rehab. He was then recommended to undergo an event monitor which also showed one instance of 8 beats of wide complex rhythm, likely NSVT. He has been asymptomatic during these episodes. He does  have orthostatic symptoms and has been diligent about his hydration. These episodes have since gotten better. No other episodes of light headedness or dizziness or syncope. No family history of sudden cardiac death.    November 2023    Questions were answered about his NSVT, physical activity levels and need for sustained monitoring.     He continues to have orthostatic symptoms but it is getting better. He has symptoms once in a few days as opposed to several times everyday in the past.    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    Labs below reviewed personally     Physical Examination  Review of Systems   Vitals: /68 (BP Location: Right arm, Patient Position: Sitting, Cuff Size: Adult Large)   Pulse 82   Resp 16   Wt 93.4 kg (206 lb)   SpO2 98%   BMI 29.56 kg/m    BMI= Body mass index is 29.56 kg/m .  Wt Readings from Last 3 Encounters:   11/08/23 93.4 kg (206 lb)   07/25/23 92.1 kg (203 lb)   06/26/23 91.6 kg (202 lb)       General Appearance:   no distress, normal body habitus   ENT/Mouth: membranes moist, no oral lesions or bleeding gums.      EYES:  no scleral icterus, normal conjunctivae   Neck: no carotid bruits or thyromegaly   Chest/Lungs:   lungs are clear to auscultation, no rales or wheezing, + sternal scar, equal chest wall expansion    Cardiovascular:   Regular. Normal first and second heart sounds with no murmurs, rubs, or gallops; the carotid, radial and posterior tibial pulses are intact, no edema bilaterally    Abdomen:  no organomegaly, masses, bruits, or tenderness; bowel sounds are present   Extremities: no cyanosis or clubbing   Skin: no xanthelasma, warm.    Neurologic: normal  bilateral, no tremors     Psychiatric: alert and oriented x3, calm        Please refer above for cardiac ROS details.         Medical History  Surgical History Family History Social History   Past Medical History:   Diagnosis Date    CHELSEY (obstructive sleep apnea)      Past Surgical History:   Procedure Laterality Date    AS REVISE TOTAL HIP REPLACEMENT Left 01/03/2023    CV CORONARY ANGIOGRAM N/A 4/21/2023    Procedure: Coronary Angiogram;  Surgeon: Javier Adames MD;  Location: [facility] CATH LAB CV    CV LEFT HEART CATH N/A 4/21/2023    Procedure: Left Heart Catheterization;  Surgeon: Javier Adames MD;  Location: [facility] CATH LAB CV    MITRAL VALVE REPAIR N/A 5/2/2023    Procedure: Mitral valve repair with anesthesia transesophageal echocardiogram, left atrial appendage excision, epiaortic ultrasound;  Surgeon: Aniceto Zeng MD;  Location: [facility] OR     No family history on file.     Social History     Socioeconomic History    Marital status:      Spouse name: Not on file    Number of children: Not on file    Years of education: Not on file    Highest education level: Not on file   Occupational History    Not on file   Tobacco Use    Smoking status: Never    Smokeless tobacco: Never   Vaping Use    Vaping Use: Never used   Substance and Sexual Activity    Alcohol use: Yes     Comment: socially    Drug use: Never    Sexual activity: Not Currently   Other Topics Concern    Not on file   Social History Narrative    Not on file     Social Determinants of Health     Financial Resource Strain: Not on file   Food Insecurity: Not on file   Transportation Needs: Not on file   Physical Activity: Not on file   Stress: Not on file   Social Connections: Not on file   Interpersonal Safety: Not on file   Housing Stability: Not on file           Medications  Allergies   Current Outpatient Medications   Medication Sig Dispense Refill    albuterol (PROAIR HFA/PROVENTIL HFA/VENTOLIN HFA) 108 (90 Base) MCG/ACT inhaler Inhale 1 puff into the lungs as needed      aspirin (ASA) 81 MG chewable tablet Take 1 tablet (81 mg)  by mouth daily      cholecalciferol (VITAMIN D3) 10 mcg (400 units) TABS tablet Take 50 mcg by mouth daily      fish oil-omega-3 fatty acids 1000 MG capsule Take 2,000 mg by mouth daily      loratadine (CLARITIN) 10 MG tablet 1 tab(s) orally once a day      metoprolol succinate ER (TOPROL XL) 25 MG 24 hr tablet TAKE 1/2 TABLET BY MOUTH DAILY 90 tablet 0    multivitamin, therapeutic (THERA-VIT) TABS tablet Take 1 tablet by mouth daily      Respiratory Therapy Supplies (CARETOUCH CPAP & BIPAP HOSE) MISC (CUSTOM Rx) C-PAP MACHINE      rizatriptan (MAXALT) 10 MG tablet 1 tab(s) orally may repeat once in 24 hr, no sooner than 2 hr after the first for migraine, 3 month supply      acetaminophen (TYLENOL) 500 MG tablet Take 1-2 tablets (500-1,000 mg) by mouth every 6 hours as needed for mild pain (Patient not taking: Reported on 11/8/2023)      EPINEPHrine (ANY BX GENERIC EQUIV) 0.3 MG/0.3ML injection 2-pack 0.3 mg intramuscularly 1X as directed, 2 of the 2 pack (Patient not taking: Reported on 11/8/2023)      ibuprofen (ADVIL/MOTRIN) 200 MG tablet Take 200-400 mg by mouth every 6 hours as needed (Patient not taking: Reported on 6/13/2023)         Allergies   Allergen Reactions    Other Food Allergy Anaphylaxis     Fruits, cherries, apples, banana - almost everything except for melons and citrus    Gluten Meal Headache     Migraines    Horse-Derived Products Other (See Comments)     Other reaction(s): facial swelling  Facial swelling            Lab Results    Chemistry/lipid CBC Cardiac Enzymes/BNP/TSH/INR   Recent Labs   Lab Test 12/10/19  1218   CHOL 167   HDL 49      TRIG 67     Recent Labs   Lab Test 12/10/19  1218 01/26/16  0733    108     Recent Labs   Lab Test 05/23/23  1416      POTASSIUM 4.0   CHLORIDE 103   CO2 25   *   BUN 17.8   CR 1.14   GFRESTIMATED 73   RY 9.4     Recent Labs   Lab Test 05/23/23  1416 05/04/23  0437 05/03/23  0413   CR 1.14 1.24* 1.01     Recent Labs   Lab Test  04/25/23  0831   A1C 5.4          Recent Labs   Lab Test 05/06/23  0513 05/03/23  0413   WBC  --  14.4*   HGB  --  12.2*   HCT  --  35.7*   MCV  --  94    153     Recent Labs   Lab Test 05/03/23  0413 05/02/23  1158 05/02/23  1055   HGB 12.2* 13.7 13.7    No results for input(s): TROPONINI in the last 23193 hours.  Recent Labs   Lab Test 04/16/23  1701   NTBNPI 143     No results for input(s): TSH in the last 86639 hours.  Recent Labs   Lab Test 05/02/23  1158 05/02/23  1052 04/25/23  0831   INR 1.30* 1.38* 1.03        Sommer Smith MD

## 2023-11-07 NOTE — PROGRESS NOTES
HEART CARE ENCOUNTER CONSULTATON NOTE      [facility]  [phone]      Assessment/Recommendations   Assessment/Plan:    Guille Owusu is a very pleasant 63 year old male with PMH of severe MR due to prolapse of posterior mitral valve s/p mitral valve repair with Medtronic 34 mm annuloplasty ring and left atrial appendage excision in May 2023 who presents today to the EP clinic.    NSVT  - Asymptomatic episodes as of now  - Cardiac MRI did not show significant scar burden(minimal fibrosis of the proximal papillary muscles)  - On Metoprolol succinate 12.5 mg currently, unable to up titrate it given orthostasis  - Event monitor showed isolated PVCs, NSVT was noted during cardiac rehab  - ILR was finally approved, will betting it on 11/10  - Will increase his physical activity level and check ILR in 3 months to decide on further increasing activity    2. Orthostatic hypotension  - Counseled on hydration and exercises prior to getting up from a sitting or lying down position  - Consider wearing compression stockings    Time spent: 30 minutes spent on the date of the encounter doing chart review, history and exam, documentation and further activities as noted above.         History of Present Illness/Subjective    HPI: Guille Owusu is a very pleasant 63 year old male with PMH of severe MR due to prolapse of posterior mitral valve s/p mitral valve repair with Medtronic 34 mm annuloplasty ring and left atrial appendage excision in May 2023 who presents today to the EP clinic.    July 2023    Dank has recovered well from his mitral valve surgery. He says he is almost back to normal baseline post surgery. He was found to have some PVCs and short runs of wide complex beats suspicious for NSVT during his rehab. He was then recommended to undergo an event monitor which also showed one instance of 8 beats of wide complex rhythm, likely NSVT. He has been asymptomatic during these episodes. He does  have orthostatic symptoms and has been diligent about his hydration. These episodes have since gotten better. No other episodes of light headedness or dizziness or syncope. No family history of sudden cardiac death.    November 2023    Questions were answered about his NSVT, physical activity levels and need for sustained monitoring.     He continues to have orthostatic symptoms but it is getting better. He has symptoms once in a few days as opposed to several times everyday in the past.    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    Labs below reviewed personally     Physical Examination  Review of Systems   Vitals: /68 (BP Location: Right arm, Patient Position: Sitting, Cuff Size: Adult Large)   Pulse 82   Resp 16   Wt 93.4 kg (206 lb)   SpO2 98%   BMI 29.56 kg/m    BMI= Body mass index is 29.56 kg/m .  Wt Readings from Last 3 Encounters:   11/08/23 93.4 kg (206 lb)   07/25/23 92.1 kg (203 lb)   06/26/23 91.6 kg (202 lb)       General Appearance:   no distress, normal body habitus   ENT/Mouth: membranes moist, no oral lesions or bleeding gums.      EYES:  no scleral icterus, normal conjunctivae   Neck: no carotid bruits or thyromegaly   Chest/Lungs:   lungs are clear to auscultation, no rales or wheezing, + sternal scar, equal chest wall expansion    Cardiovascular:   Regular. Normal first and second heart sounds with no murmurs, rubs, or gallops; the carotid, radial and posterior tibial pulses are intact, no edema bilaterally    Abdomen:  no organomegaly, masses, bruits, or tenderness; bowel sounds are present   Extremities: no cyanosis or clubbing   Skin: no xanthelasma, warm.    Neurologic: normal  bilateral, no tremors     Psychiatric: alert and oriented x3, calm        Please refer above for cardiac ROS details.         Medical History  Surgical History Family History Social History   Past Medical History:   Diagnosis Date    CHELSEY (obstructive sleep apnea)      Past Surgical History:   Procedure Laterality Date    AS REVISE TOTAL HIP REPLACEMENT Left 01/03/2023    CV CORONARY ANGIOGRAM N/A 4/21/2023    Procedure: Coronary Angiogram;  Surgeon: Javier Adames MD;  Location: [facility] CATH LAB CV    CV LEFT HEART CATH N/A 4/21/2023    Procedure: Left Heart Catheterization;  Surgeon: Javier Adames MD;  Location: [facility] CATH LAB CV    MITRAL VALVE REPAIR N/A 5/2/2023    Procedure: Mitral valve repair with anesthesia transesophageal echocardiogram, left atrial appendage excision, epiaortic ultrasound;  Surgeon: Aniceto Zeng MD;  Location: [facility] OR     No family history on file.     Social History     Socioeconomic History    Marital status:      Spouse name: Not on file    Number of children: Not on file    Years of education: Not on file    Highest education level: Not on file   Occupational History    Not on file   Tobacco Use    Smoking status: Never    Smokeless tobacco: Never   Vaping Use    Vaping Use: Never used   Substance and Sexual Activity    Alcohol use: Yes     Comment: socially    Drug use: Never    Sexual activity: Not Currently   Other Topics Concern    Not on file   Social History Narrative    Not on file     Social Determinants of Health     Financial Resource Strain: Not on file   Food Insecurity: Not on file   Transportation Needs: Not on file   Physical Activity: Not on file   Stress: Not on file   Social Connections: Not on file   Interpersonal Safety: Not on file   Housing Stability: Not on file           Medications  Allergies   Current Outpatient Medications   Medication Sig Dispense Refill    albuterol (PROAIR HFA/PROVENTIL HFA/VENTOLIN HFA) 108 (90 Base) MCG/ACT inhaler Inhale 1 puff into the lungs as needed      aspirin (ASA) 81 MG chewable tablet Take 1 tablet (81 mg)  by mouth daily      cholecalciferol (VITAMIN D3) 10 mcg (400 units) TABS tablet Take 50 mcg by mouth daily      fish oil-omega-3 fatty acids 1000 MG capsule Take 2,000 mg by mouth daily      loratadine (CLARITIN) 10 MG tablet 1 tab(s) orally once a day      metoprolol succinate ER (TOPROL XL) 25 MG 24 hr tablet TAKE 1/2 TABLET BY MOUTH DAILY 90 tablet 0    multivitamin, therapeutic (THERA-VIT) TABS tablet Take 1 tablet by mouth daily      Respiratory Therapy Supplies (CARETOUCH CPAP & BIPAP HOSE) MISC (CUSTOM Rx) C-PAP MACHINE      rizatriptan (MAXALT) 10 MG tablet 1 tab(s) orally may repeat once in 24 hr, no sooner than 2 hr after the first for migraine, 3 month supply      acetaminophen (TYLENOL) 500 MG tablet Take 1-2 tablets (500-1,000 mg) by mouth every 6 hours as needed for mild pain (Patient not taking: Reported on 11/8/2023)      EPINEPHrine (ANY BX GENERIC EQUIV) 0.3 MG/0.3ML injection 2-pack 0.3 mg intramuscularly 1X as directed, 2 of the 2 pack (Patient not taking: Reported on 11/8/2023)      ibuprofen (ADVIL/MOTRIN) 200 MG tablet Take 200-400 mg by mouth every 6 hours as needed (Patient not taking: Reported on 6/13/2023)         Allergies   Allergen Reactions    Other Food Allergy Anaphylaxis     Fruits, cherries, apples, banana - almost everything except for melons and citrus    Gluten Meal Headache     Migraines    Horse-Derived Products Other (See Comments)     Other reaction(s): facial swelling  Facial swelling            Lab Results    Chemistry/lipid CBC Cardiac Enzymes/BNP/TSH/INR   Recent Labs   Lab Test 12/10/19  1218   CHOL 167   HDL 49      TRIG 67     Recent Labs   Lab Test 12/10/19  1218 01/26/16  0733    108     Recent Labs   Lab Test 05/23/23  1416      POTASSIUM 4.0   CHLORIDE 103   CO2 25   *   BUN 17.8   CR 1.14   GFRESTIMATED 73   RY 9.4     Recent Labs   Lab Test 05/23/23  1416 05/04/23  0437 05/03/23  0413   CR 1.14 1.24* 1.01     Recent Labs   Lab Test  04/25/23  0831   A1C 5.4          Recent Labs   Lab Test 05/06/23  0513 05/03/23  0413   WBC  --  14.4*   HGB  --  12.2*   HCT  --  35.7*   MCV  --  94    153     Recent Labs   Lab Test 05/03/23  0413 05/02/23  1158 05/02/23  1055   HGB 12.2* 13.7 13.7    No results for input(s): TROPONINI in the last 17930 hours.  Recent Labs   Lab Test 04/16/23  1701   NTBNPI 143     No results for input(s): TSH in the last 58425 hours.  Recent Labs   Lab Test 05/02/23  1158 05/02/23  1052 04/25/23  0831   INR 1.30* 1.38* 1.03        Sommer Smith MD

## 2023-11-08 ENCOUNTER — OFFICE VISIT (OUTPATIENT)
Dept: CARDIOLOGY | Facility: CLINIC | Age: 63
End: 2023-11-08
Payer: COMMERCIAL

## 2023-11-08 VITALS
WEIGHT: 206 LBS | BODY MASS INDEX: 29.56 KG/M2 | SYSTOLIC BLOOD PRESSURE: 108 MMHG | OXYGEN SATURATION: 98 % | DIASTOLIC BLOOD PRESSURE: 68 MMHG | HEART RATE: 82 BPM | RESPIRATION RATE: 16 BRPM

## 2023-11-08 DIAGNOSIS — I47.29 NON-SUSTAINED VENTRICULAR TACHYCARDIA (H): Primary | ICD-10-CM

## 2023-11-08 PROCEDURE — 99214 OFFICE O/P EST MOD 30 MIN: CPT | Performed by: INTERNAL MEDICINE

## 2023-11-08 NOTE — LETTER
11/8/2023    RADHA BENDER MD  [facility] [address]  [facility] [address]    RE: Guille Owusu       Dear Colleague,     I had the pleasure of seeing Guille Owusu in the [facility].    HEART CARE ENCOUNTER CONSULTATON NOTE      [facility]  [phone]      Assessment/Recommendations   Assessment/Plan:    Guille Owusu is a very pleasant 63 year old male with PMH of severe MR due to prolapse of posterior mitral valve s/p mitral valve repair with Medtronic 34 mm annuloplasty ring and left atrial appendage excision in May 2023 who presents today to the EP clinic.    NSVT  - Asymptomatic episodes as of now  - Cardiac MRI did not show significant scar burden(minimal fibrosis of the proximal papillary muscles)  - On Metoprolol succinate 12.5 mg currently, unable to up titrate it given orthostasis  - Event monitor showed isolated PVCs, NSVT was noted during cardiac rehab  - ILR was finally approved, will betting it on 11/10  - Will increase his physical activity level and check ILR in 3 months to decide on further increasing activity    2. Orthostatic hypotension  - Counseled on hydration and exercises prior to getting up from a sitting or lying down position  - Consider wearing compression stockings    Time spent: 30 minutes spent on the date of the encounter doing chart review, history and exam, documentation and further activities as noted above.         History of Present Illness/Subjective    HPI: Guille Owusu is a very pleasant 63 year old male with PMH of severe MR due to prolapse of posterior mitral valve s/p mitral valve repair with Medtronic 34 mm annuloplasty ring and left atrial appendage excision in May 2023 who presents today to the EP clinic.    July 2023    Dank has recovered well from his mitral valve surgery. He says he is almost back to normal baseline post surgery. He was found to have some PVCs and short runs of wide complex  beats suspicious for NSVT during his rehab. He was then recommended to undergo an event monitor which also showed one instance of 8 beats of wide complex rhythm, likely NSVT. He has been asymptomatic during these episodes. He does have orthostatic symptoms and has been diligent about his hydration. These episodes have since gotten better. No other episodes of light headedness or dizziness or syncope. No family history of sudden cardiac death.    November 2023    Questions were answered about his NSVT, physical activity levels and need for sustained monitoring.     He continues to have orthostatic symptoms but it is getting better. He has symptoms once in a few days as opposed to several times everyday in the past.    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    Labs below reviewed personally     Physical Examination  Review of Systems   Vitals: /68 (BP Location: Right arm, Patient Position: Sitting, Cuff Size: Adult Large)   Pulse 82   Resp 16   Wt 93.4 kg (206 lb)   SpO2 98%   BMI 29.56 kg/m    BMI= Body mass index is 29.56 kg/m .  Wt Readings from Last 3 Encounters:   11/08/23 93.4 kg (206 lb)   07/25/23 92.1 kg (203 lb)   06/26/23 91.6 kg (202 lb)       General Appearance:   no distress, normal body habitus   ENT/Mouth: membranes moist, no oral lesions or bleeding gums.      EYES:  no scleral icterus, normal conjunctivae   Neck: no carotid bruits or thyromegaly   Chest/Lungs:   lungs are clear to auscultation, no rales or wheezing, + sternal scar, equal chest wall expansion    Cardiovascular:   Regular. Normal first and second heart sounds with no murmurs, rubs, or gallops; the carotid, radial and posterior tibial pulses are intact, no edema bilaterally    Abdomen:  no organomegaly, masses, bruits, or tenderness; bowel sounds are  present   Extremities: no cyanosis or clubbing   Skin: no xanthelasma, warm.    Neurologic: normal  bilateral, no tremors     Psychiatric: alert and oriented x3, calm        Please refer above for cardiac ROS details.        Medical History  Surgical History Family History Social History   Past Medical History:   Diagnosis Date    CHELSEY (obstructive sleep apnea)      Past Surgical History:   Procedure Laterality Date    AS REVISE TOTAL HIP REPLACEMENT Left 01/03/2023    CV CORONARY ANGIOGRAM N/A 4/21/2023    Procedure: Coronary Angiogram;  Surgeon: Javier Adames MD;  Location: [facility] CATH LAB CV    CV LEFT HEART CATH N/A 4/21/2023    Procedure: Left Heart Catheterization;  Surgeon: Javier Adames MD;  Location: [facility] CATH LAB CV    MITRAL VALVE REPAIR N/A 5/2/2023    Procedure: Mitral valve repair with anesthesia transesophageal echocardiogram, left atrial appendage excision, epiaortic ultrasound;  Surgeon: Aniceto Zeng MD;  Location: [facility] OR     No family history on file.     Social History     Socioeconomic History    Marital status:      Spouse name: Not on file    Number of children: Not on file    Years of education: Not on file    Highest education level: Not on file   Occupational History    Not on file   Tobacco Use    Smoking status: Never    Smokeless tobacco: Never   Vaping Use    Vaping Use: Never used   Substance and Sexual Activity    Alcohol use: Yes     Comment: socially    Drug use: Never    Sexual activity: Not Currently   Other Topics Concern    Not on file   Social History Narrative    Not on file     Social Determinants of Health     Financial Resource Strain: Not on file   Food Insecurity: Not on file   Transportation Needs: Not on file   Physical Activity: Not on file   Stress: Not on file   Social Connections: Not on file   Interpersonal Safety: Not on file   Housing Stability: Not on file           Medications  Allergies   Current Outpatient  Medications   Medication Sig Dispense Refill    albuterol (PROAIR HFA/PROVENTIL HFA/VENTOLIN HFA) 108 (90 Base) MCG/ACT inhaler Inhale 1 puff into the lungs as needed      aspirin (ASA) 81 MG chewable tablet Take 1 tablet (81 mg) by mouth daily      cholecalciferol (VITAMIN D3) 10 mcg (400 units) TABS tablet Take 50 mcg by mouth daily      fish oil-omega-3 fatty acids 1000 MG capsule Take 2,000 mg by mouth daily      loratadine (CLARITIN) 10 MG tablet 1 tab(s) orally once a day      metoprolol succinate ER (TOPROL XL) 25 MG 24 hr tablet TAKE 1/2 TABLET BY MOUTH DAILY 90 tablet 0    multivitamin, therapeutic (THERA-VIT) TABS tablet Take 1 tablet by mouth daily      Respiratory Therapy Supplies (CARETOUCH CPAP & BIPAP HOSE) MISC (CUSTOM Rx) C-PAP MACHINE      rizatriptan (MAXALT) 10 MG tablet 1 tab(s) orally may repeat once in 24 hr, no sooner than 2 hr after the first for migraine, 3 month supply      acetaminophen (TYLENOL) 500 MG tablet Take 1-2 tablets (500-1,000 mg) by mouth every 6 hours as needed for mild pain (Patient not taking: Reported on 11/8/2023)      EPINEPHrine (ANY BX GENERIC EQUIV) 0.3 MG/0.3ML injection 2-pack 0.3 mg intramuscularly 1X as directed, 2 of the 2 pack (Patient not taking: Reported on 11/8/2023)      ibuprofen (ADVIL/MOTRIN) 200 MG tablet Take 200-400 mg by mouth every 6 hours as needed (Patient not taking: Reported on 6/13/2023)         Allergies   Allergen Reactions    Other Food Allergy Anaphylaxis     Fruits, cherries, apples, banana - almost everything except for melons and citrus    Gluten Meal Headache     Migraines    Horse-Derived Products Other (See Comments)     Other reaction(s): facial swelling  Facial swelling            Lab Results    Chemistry/lipid CBC Cardiac Enzymes/BNP/TSH/INR   Recent Labs   Lab Test 12/10/19  1218   CHOL 167   HDL 49      TRIG 67     Recent Labs   Lab Test 12/10/19  1218 01/26/16  0733    108     Recent Labs   Lab Test 05/23/23  1416       POTASSIUM 4.0   CHLORIDE 103   CO2 25   *   BUN 17.8   CR 1.14   GFRESTIMATED 73   RY 9.4     Recent Labs   Lab Test 05/23/23  1416 05/04/23  0437 05/03/23  0413   CR 1.14 1.24* 1.01     Recent Labs   Lab Test 04/25/23  0831   A1C 5.4          Recent Labs   Lab Test 05/06/23  0513 05/03/23  0413   WBC  --  14.4*   HGB  --  12.2*   HCT  --  35.7*   MCV  --  94    153     Recent Labs   Lab Test 05/03/23  0413 05/02/23  1158 05/02/23  1055   HGB 12.2* 13.7 13.7    No results for input(s): TROPONINI in the last 17734 hours.  Recent Labs   Lab Test 04/16/23  1701   NTBNPI 143     No results for input(s): TSH in the last 86135 hours.  Recent Labs   Lab Test 05/02/23  1158 05/02/23  1052 04/25/23  0831   INR 1.30* 1.38* 1.03        Sommer Smith MD        Thank you for allowing me to participate in the care of your patient.      Sincerely,     Sommer Smith MD     [facility]  cc:   Sommer Smith MD  [address] [place] [id number]

## 2023-11-08 NOTE — PATIENT INSTRUCTIONS
[facility]  Cardiac Electrophysiology  [address]  [place] [id number]   Office: [phone]  Fax: [phone]       Thank you for seeing us in clinic today - it is a pleasure to be a part of your care team.  Below is a summary of our plan from today's visit.      ILR to be placed on 11/10  Continue aggressive hydration  Consider compression stockings   We will see you in 3-4 months    Please do not hesitate to be in touch with our office at [phone] with any questions that may arise.      Thank you for trusting us with your care,    Sommer Smith MD  Clinical Cardiac Electrophysiology  [facility]  [address]  [place] [address]   Office: [phone]  Fax: [phone]

## 2023-11-10 ENCOUNTER — HOSPITAL ENCOUNTER (OUTPATIENT)
Facility: HOSPITAL | Age: 63
Discharge: HOME | End: 2023-11-10
Attending: NURSE PRACTITIONER | Admitting: INTERNAL MEDICINE
Payer: COMMERCIAL

## 2023-11-10 VITALS
RESPIRATION RATE: 17 BRPM | HEIGHT: 70 IN | WEIGHT: 204 LBS | TEMPERATURE: 98.1 F | BODY MASS INDEX: 29.2 KG/M2 | HEART RATE: 70 BPM | SYSTOLIC BLOOD PRESSURE: 117 MMHG | DIASTOLIC BLOOD PRESSURE: 72 MMHG | OXYGEN SATURATION: 96 %

## 2023-11-10 DIAGNOSIS — I47.29 NON-SUSTAINED VENTRICULAR TACHYCARDIA (H): ICD-10-CM

## 2023-11-10 DIAGNOSIS — Z98.890 S/P MVR (MITRAL VALVE REPAIR): ICD-10-CM

## 2023-11-10 DIAGNOSIS — R00.0 WIDE-COMPLEX TACHYCARDIA: ICD-10-CM

## 2023-11-10 PROCEDURE — 33285 INSJ SUBQ CAR RHYTHM MNTR: CPT | Performed by: NURSE PRACTITIONER

## 2023-11-10 PROCEDURE — C1764 EVENT RECORDER, CARDIAC: HCPCS | Performed by: NURSE PRACTITIONER

## 2023-11-10 PROCEDURE — 250N000009 HC RX 250: Performed by: NURSE PRACTITIONER

## 2023-11-10 DEVICE — MONITOR CARDIAC LINQ II INSERTABLE LNQ22: Type: IMPLANTABLE DEVICE | Site: STERNUM | Status: FUNCTIONAL

## 2023-11-10 RX ORDER — FENTANYL CITRATE 50 UG/ML
25 INJECTION, SOLUTION INTRAMUSCULAR; INTRAVENOUS
Status: DISCONTINUED | OUTPATIENT
Start: 2023-11-10 | End: 2023-11-10 | Stop reason: HOSPADM

## 2023-11-10 RX ORDER — SODIUM CHLORIDE 9 MG/ML
100 INJECTION, SOLUTION INTRAVENOUS CONTINUOUS
Status: DISCONTINUED | OUTPATIENT
Start: 2023-11-10 | End: 2023-11-10 | Stop reason: HOSPADM

## 2023-11-10 RX ORDER — LIDOCAINE 40 MG/G
CREAM TOPICAL
Status: DISCONTINUED | OUTPATIENT
Start: 2023-11-10 | End: 2023-11-10 | Stop reason: HOSPADM

## 2023-11-10 ASSESSMENT — ACTIVITIES OF DAILY LIVING (ADL): ADLS_ACUITY_SCORE: 35

## 2023-11-10 NOTE — INTERVAL H&P NOTE
"I have reviewed the surgical (or preoperative) H&P that is linked to this encounter, and examined the patient. There are no significant changes.    Clinical Conditions Present on Arrival:  Clinically Significant Risk Factors Present on Admission                 # Drug Induced Platelet Defect: home medication list includes an antiplatelet medication  # Overweight: Estimated body mass index is 29.27 kg/m  as calculated from the following:    Height as of this encounter: 1.778 m (5' 10\").    Weight as of this encounter: 92.5 kg (204 lb).       "

## 2023-11-10 NOTE — DISCHARGE INSTRUCTIONS
[facility]  Cardiac Electrophysiology  [address]  [place] [address]   Office: [phone]  Fax: [phone]     Cardiac Electrophysiology - Loop Recorder Implantation Discharge Instructions      PROCEDURE   ILR (implantable loop recorder) placement         MEDICATION INSTRUCTIONS   Continue taking your current medications.         WOUND CARE   Leave the large overlying dressing in place until 3 days after discharge - this dressing can thereafter be removed by gently pulling off.  Underneath the large dressing will be steri-strips. DO NOT REMOVE these strips; please leave these in place until you are seen in clinic.  DO NOT COVER the site with any bandages or dressings. The site should be kept dry for 3-5 days - please avoid traditional showers or baths during this time.  Keep the site clean and dry.  No swimming, sauna, or hot tub use until incision is completely healed.         ACTIVITY   It takes approximately 1-2 weeks for your incision to heal.  During this time use extra precautions - please avoid the following:  Raising your arm over your head or stretching behind your back (no hyperflexion)  Pushing or pulling (mowing the lawn, vacuuming)  Lifting anything heavier than 10 pounds or a gallon of milk  Sudden or extreme motions  Be physically active every day.  Moving your arm is important         REMOTE MONITORING   Your device was linked to the [employer] on your phone.  You may call our office or the company technical support line if you need assistance connecting.  Please send a transmission the day after you go home  Automated transmissions will be sent every 3 months or sooner if device issues are detected.  You may manually send in transmissions if you are having arrhythmia symptoms or think there may be a problem with your device.  Please call our office at [phone] if you send in a transmission off-schedule         WHAT TO WATCH OUT FOR   Contact our office  or seek emergency care for any of the following:  Drainage, bleeding or oozing from the site  Redness, increased swelling, or warmth around the device site  Pain not treated with prescribed pain medication  Temperature greater than 100.4F  Chest pain, shortness of breath, dizziness/fainting         FOLLOW-UP   Our office will coordinate a follow-up visit visit in clinic for a device interrogation and an incision check 7-14 days after your procedure.   Please contact us at [phone] any issues during your recovery.

## 2023-11-15 NOTE — DISCHARGE INSTRUCTIONS
Implantable Loop Recorder (ILR) Post-operative Checklist    The Device Registered Nurse (RN) reviewed the device function.    The Device RN did a wound assessment and wound care teaching.    Please call the Device Nurses with any signs of infection or questions regarding wound healing. Device Nurse Line: [phone], Option #3.    The Device RN demonstrated and displayed the specific remote monitoring system for your device.    The Device RN reviewed the Partnership Agreement Form.    Patient Instructions  You were provided with a device identification (ID) in the hospital. Please carry it with you.     You may travel by any mode of transportation; just show your device ID card.     For any surgery, let your doctor know you have an implantable loop recorder.     Your device is MRI safe     Please call the Device Clinic after each time you use your Patient Symptom Activator.           Device Clinic Contact Information  Device Nurses: [phone], Option #3.  Device Remote Specialists: [phone], Option #2. For questions about your Remote Transmission or Transmission Schedule  Device Schedulers: [phone], Option #1

## 2023-11-17 ENCOUNTER — ANCILLARY PROCEDURE (OUTPATIENT)
Dept: CARDIOLOGY | Facility: CLINIC | Age: 63
End: 2023-11-17
Attending: INTERNAL MEDICINE
Payer: COMMERCIAL

## 2023-11-17 DIAGNOSIS — R00.0 WIDE-COMPLEX TACHYCARDIA: ICD-10-CM

## 2023-11-17 DIAGNOSIS — I47.29 NON-SUSTAINED VENTRICULAR TACHYCARDIA (H): Primary | ICD-10-CM

## 2023-11-17 DIAGNOSIS — Z95.818 STATUS POST PLACEMENT OF IMPLANTABLE LOOP RECORDER: ICD-10-CM

## 2023-11-17 PROCEDURE — 93291 INTERROG DEV EVAL SCRMS IP: CPT | Performed by: INTERNAL MEDICINE

## 2023-11-27 LAB
MDC_IDC_PG_IMPLANT_DTM: NORMAL
MDC_IDC_PG_MFG: NORMAL
MDC_IDC_PG_MODEL: NORMAL
MDC_IDC_PG_SERIAL: NORMAL
MDC_IDC_PG_TYPE: NORMAL
MDC_IDC_SESS_CLINIC_NAME: NORMAL
MDC_IDC_SESS_DTM: NORMAL
MDC_IDC_SESS_TYPE: NORMAL

## 2023-12-05 ENCOUNTER — TELEPHONE (OUTPATIENT)
Dept: CARDIOLOGY | Facility: CLINIC | Age: 63
End: 2023-12-05

## 2023-12-05 DIAGNOSIS — Z95.818 STATUS POST PLACEMENT OF IMPLANTABLE LOOP RECORDER: Primary | ICD-10-CM

## 2023-12-05 DIAGNOSIS — I47.29 NON-SUSTAINED VENTRICULAR TACHYCARDIA (H): ICD-10-CM

## 2023-12-05 NOTE — TELEPHONE ENCOUNTER
Spoke with Pt regarding recommendations from Dr. Ram, Pt in agreement. Transferred to scheduling-    From: Armando Ram MD  Sent: 12/5/2023   1:50 PM CST  To: Mohini Owusu    Just morning of test  ----- Message -----  From: Mohini Owusu  Sent: 12/5/2023   1:15 PM CST  To: Armando Ram MD    Hi Dr. Ram,    Do you want beta blocker held for this?    Thanks,        Message sent to provider regarding holding beta blocker-    ----- Message from Armando Ram MD sent at 12/5/2023 12:08 PM CST -----  Mohini,    Could we set up a stress echocardiogram for Mr. Owusu.  If this could be done at [facility] and I would actually like to be at the test so that we can monitor his ventricular dysrhythmias with exercise as well as look at the gradient across his mitral valve at rest and after exercise.    Armando Hanson  ----- Message -----  From: Sommer Smith MD  Sent: 12/5/2023   9:12 AM CST  To: Armando Ram MD    I am okay with the stress test.  ----- Message -----  From: Armando Ram MD  Sent: 12/4/2023   7:21 PM CST  To: MD Sommer Drew,  Patient very slowly has improved functional capacity. Wondering what you think about a stress echo?  Could evaluate ectopy with exertion and MV gradient with exertion. I would probably supervise the test.    Armando

## 2024-01-07 DIAGNOSIS — I34.0 MITRAL REGURGITATION: ICD-10-CM

## 2024-01-07 DIAGNOSIS — I34.0 MITRAL VALVE INSUFFICIENCY: ICD-10-CM

## 2024-01-10 ENCOUNTER — HOSPITAL ENCOUNTER (OUTPATIENT)
Dept: CARDIOLOGY | Facility: CLINIC | Age: 64
Discharge: HOME | End: 2024-01-10
Attending: INTERNAL MEDICINE | Admitting: INTERNAL MEDICINE
Payer: COMMERCIAL

## 2024-01-10 DIAGNOSIS — Z95.818 STATUS POST PLACEMENT OF IMPLANTABLE LOOP RECORDER: ICD-10-CM

## 2024-01-10 DIAGNOSIS — I47.29 NON-SUSTAINED VENTRICULAR TACHYCARDIA (H): ICD-10-CM

## 2024-01-10 PROCEDURE — 93016 CV STRESS TEST SUPVJ ONLY: CPT | Performed by: INTERNAL MEDICINE

## 2024-01-10 PROCEDURE — 93350 STRESS TTE ONLY: CPT | Mod: 26 | Performed by: INTERNAL MEDICINE

## 2024-01-10 PROCEDURE — 93321 DOPPLER ECHO F-UP/LMTD STD: CPT | Mod: 26 | Performed by: INTERNAL MEDICINE

## 2024-01-10 PROCEDURE — 93018 CV STRESS TEST I&R ONLY: CPT | Performed by: INTERNAL MEDICINE

## 2024-01-10 PROCEDURE — 93350 STRESS TTE ONLY: CPT | Mod: TC

## 2024-01-10 PROCEDURE — 93325 DOPPLER ECHO COLOR FLOW MAPG: CPT | Mod: 26 | Performed by: INTERNAL MEDICINE

## 2024-01-10 PROCEDURE — 93325 DOPPLER ECHO COLOR FLOW MAPG: CPT | Mod: TC

## 2024-01-15 RX ORDER — METOPROLOL SUCCINATE 25 MG/1
TABLET, EXTENDED RELEASE ORAL
Qty: 45 TABLET | Refills: 1 | Status: SHIPPED | OUTPATIENT
Start: 2024-01-15 | End: 2024-07-01

## 2024-02-12 ENCOUNTER — ANCILLARY PROCEDURE (OUTPATIENT)
Dept: CARDIOLOGY | Facility: CLINIC | Age: 64
End: 2024-02-12
Attending: INTERNAL MEDICINE
Payer: COMMERCIAL

## 2024-02-12 DIAGNOSIS — Z98.890 HISTORY OF LOOP RECORDER: ICD-10-CM

## 2024-02-12 PROCEDURE — 93297 REM INTERROG DEV EVAL ICPMS: CPT | Performed by: INTERNAL MEDICINE

## 2024-02-13 ENCOUNTER — OFFICE VISIT (OUTPATIENT)
Dept: CARDIOLOGY | Facility: CLINIC | Age: 64
End: 2024-02-13
Attending: INTERNAL MEDICINE
Payer: COMMERCIAL

## 2024-02-13 VITALS
DIASTOLIC BLOOD PRESSURE: 70 MMHG | BODY MASS INDEX: 29.56 KG/M2 | WEIGHT: 206 LBS | RESPIRATION RATE: 16 BRPM | HEART RATE: 61 BPM | SYSTOLIC BLOOD PRESSURE: 110 MMHG

## 2024-02-13 DIAGNOSIS — I47.29 NON-SUSTAINED VENTRICULAR TACHYCARDIA (H): ICD-10-CM

## 2024-02-13 PROCEDURE — 99214 OFFICE O/P EST MOD 30 MIN: CPT | Performed by: INTERNAL MEDICINE

## 2024-02-13 NOTE — LETTER
2/13/2024    RADHA BENDER MD  [address] [facility] [address]    RE: Guille Owusu       Dear Colleague,     I had the pleasure of seeing Guille Owusu in the [facility].    HEART CARE ENCOUNTER CONSULTATON NOTE      [facility]  [phone]      Assessment/Recommendations   Assessment/Plan:    Guille Owusu is a very pleasant 63 year old male with PMH of severe MR due to prolapse of posterior mitral valve s/p mitral valve repair with Medtronic 34 mm annuloplasty ring and left atrial appendage excision in May 2023 who presents today to the EP clinic.    NSVT  - Asymptomatic episodes as of now  - Cardiac MRI did not show significant scar burden(minimal fibrosis of the proximal papillary muscles)  - On Metoprolol succinate 12.5 mg currently, unable to up titrate it given orthostasis  - Event monitor showed isolated PVCs, NSVT was noted during cardiac rehab  - ILR implanted in November 2023, 1.3% burden per recent device check    2. Orthostatic hypotension  - Counseled on hydration and exercises prior to getting up from a sitting or lying down position  - Consider wearing compression stockings    Follow up in 12 months    Time spent: 30 minutes spent on the date of the encounter doing chart review, history and exam, documentation and further activities as noted above.         History of Present Illness/Subjective    HPI: Guille Owusu is a very pleasant 63 year old male with PMH of severe MR due to prolapse of posterior mitral valve s/p mitral valve repair with Medtronic 34 mm annuloplasty ring and left atrial appendage excision in May 2023 who presents today to the EP clinic.    July 2023    Dank has recovered well from his mitral valve surgery. He says he is almost back to normal baseline post surgery. He was found to have some PVCs and short runs of wide complex beats suspicious for NSVT during his rehab. He was then recommended to undergo an event  monitor which also showed one instance of 8 beats of wide complex rhythm, likely NSVT. He has been asymptomatic during these episodes. He does have orthostatic symptoms and has been diligent about his hydration. These episodes have since gotten better. No other episodes of light headedness or dizziness or syncope. No family history of sudden cardiac death.    November 2023    Questions were answered about his NSVT, physical activity levels and need for sustained monitoring.     He continues to have orthostatic symptoms but it is getting better. He has symptoms once in a few days as opposed to several times everyday in the past.    February 2024    He has done 3 hike-moderate level in intensity according to his standards- an hour to 3 hours long. He has also been using hand weights everyday, some core exercises. He bought a bike and has done 2 miles rides on it. He does   Has some light headedness after standing from a bending position. Overall he reports an improvement in his orthostatic symptoms.    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    Labs below reviewed personally     Physical Examination  Review of Systems   Vitals: /70 (BP Location: Right arm, Patient Position: Sitting, Cuff Size: Adult Regular)   Pulse 61   Resp 16   Wt 93.4 kg (206 lb)   BMI 29.56 kg/m    BMI= Body mass index is 29.56 kg/m .  Wt Readings from Last 3 Encounters:   02/13/24 93.4 kg (206 lb)   11/10/23 92.5 kg (204 lb)   11/08/23 93.4 kg (206 lb)       General Appearance:   no distress, normal body habitus   ENT/Mouth: membranes moist, no oral lesions or bleeding gums.      EYES:  no scleral icterus, normal conjunctivae   Neck: no carotid bruits or thyromegaly   Chest/Lungs:   lungs are clear to auscultation, no rales or wheezing, + sternal scar, equal chest  wall expansion    Cardiovascular:   Regular. Normal first and second heart sounds with no murmurs, rubs, or gallops; the carotid, radial and posterior tibial pulses are intact, no edema bilaterally    Abdomen:  no organomegaly, masses, bruits, or tenderness; bowel sounds are present   Extremities: no cyanosis or clubbing   Skin: no xanthelasma, warm.    Neurologic: normal  bilateral, no tremors     Psychiatric: alert and oriented x3, calm        Please refer above for cardiac ROS details.        Medical History  Surgical History Family History Social History   Past Medical History:   Diagnosis Date    CHELSEY (obstructive sleep apnea)      Past Surgical History:   Procedure Laterality Date    AS REVISE TOTAL HIP REPLACEMENT Left 01/03/2023    CV CORONARY ANGIOGRAM N/A 4/21/2023    Procedure: Coronary Angiogram;  Surgeon: Javier Adames MD;  Location: [facility] CV    CV LEFT HEART CATH N/A 4/21/2023    Procedure: Left Heart Catheterization;  Surgeon: Javier Adames MD;  Location: [facility] CV    EP LOOP RECORDER IMPLANT N/A 11/10/2023    Procedure: Loop Recorder Implant;  Surgeon: Sabina Gonzalez APRN CNP;  Location: [facility] CATH LAB CV    MITRAL VALVE REPAIR N/A 5/2/2023    Procedure: Mitral valve repair with anesthesia transesophageal echocardiogram, left atrial appendage excision, epiaortic ultrasound;  Surgeon: Aniceto Zeng MD;  Location: [facility] OR     No family history on file.     Social History     Socioeconomic History    Marital status:      Spouse name: Not on file    Number of children: Not on file    Years of education: Not on file    Highest education level: Not on file   Occupational History    Not on file   Tobacco Use    Smoking status: Never    Smokeless tobacco: Never   Vaping Use    Vaping Use: Never used   Substance and Sexual Activity    Alcohol use: Yes     Comment: socially    Drug use: Never    Sexual activity: Not Currently   Other Topics  Concern    Not on file   Social History Narrative    Not on file     Social Determinants of Health     Financial Resource Strain: Not on file   Food Insecurity: Not on file   Transportation Needs: Not on file   Physical Activity: Not on file   Stress: Not on file   Social Connections: Not on file   Interpersonal Safety: Not on file   Housing Stability: Not on file           Medications  Allergies   Current Outpatient Medications   Medication Sig Dispense Refill    acetaminophen (TYLENOL) 500 MG tablet Take 1-2 tablets (500-1,000 mg) by mouth every 6 hours as needed for mild pain      albuterol (PROAIR HFA/PROVENTIL HFA/VENTOLIN HFA) 108 (90 Base) MCG/ACT inhaler Inhale 1 puff into the lungs as needed      aspirin (ASA) 81 MG chewable tablet Take 1 tablet (81 mg) by mouth daily      cholecalciferol (VITAMIN D3) 10 mcg (400 units) TABS tablet Take 50 mcg by mouth daily      EPINEPHrine (ANY BX GENERIC EQUIV) 0.3 MG/0.3ML injection 2-pack       fish oil-omega-3 fatty acids 1000 MG capsule Take 2,000 mg by mouth daily      ibuprofen (ADVIL/MOTRIN) 200 MG tablet Take 200-400 mg by mouth every 6 hours as needed      loratadine (CLARITIN) 10 MG tablet 1 tab(s) orally once a day      metoprolol succinate ER (TOPROL XL) 25 MG 24 hr tablet TAKE 1/2 TABLET BY MOUTH DAILY 45 tablet 1    multivitamin, therapeutic (THERA-VIT) TABS tablet Take 1 tablet by mouth daily      Respiratory Therapy Supplies (CARETOUCH CPAP & BIPAP HOSE) MISC (CUSTOM Rx) C-PAP MACHINE      rizatriptan (MAXALT) 10 MG tablet 1 tab(s) orally may repeat once in 24 hr, no sooner than 2 hr after the first for migraine, 3 month supply         Allergies   Allergen Reactions    Other Food Allergy Anaphylaxis     Fruits, cherries, apples, banana - almost everything except for melons and citrus    Gluten Meal Headache     Migraines    Horse-Derived Products Other (See Comments)     Other reaction(s): facial swelling  Facial swelling            Lab Results     Chemistry/lipid CBC Cardiac Enzymes/BNP/TSH/INR   Recent Labs   Lab Test 12/10/19  1218   CHOL 167   HDL 49      TRIG 67     Recent Labs   Lab Test 12/10/19  1218 01/26/16  0733    108     Recent Labs   Lab Test 05/23/23  1416      POTASSIUM 4.0   CHLORIDE 103   CO2 25   *   BUN 17.8   CR 1.14   GFRESTIMATED 73   RY 9.4     Recent Labs   Lab Test 05/23/23  1416 05/04/23  0437 05/03/23  0413   CR 1.14 1.24* 1.01     Recent Labs   Lab Test 04/25/23  0831   A1C 5.4          Recent Labs   Lab Test 05/06/23  0513 05/03/23  0413   WBC  --  14.4*   HGB  --  12.2*   HCT  --  35.7*   MCV  --  94    153     Recent Labs   Lab Test 05/03/23  0413 05/02/23  1158 05/02/23  1055   HGB 12.2* 13.7 13.7    No results for input(s): TROPONINI in the last 79357 hours.  Recent Labs   Lab Test 04/16/23  1701   NTBNPI 143     No results for input(s): TSH in the last 18701 hours.  Recent Labs   Lab Test 05/02/23  1158 05/02/23  1052 04/25/23  0831   INR 1.30* 1.38* 1.03        Sommer Smith MD            Thank you for allowing me to participate in the care of your patient.      Sincerely,     Sommer Smith MD     [facility]  cc:   Sommer Smith MD  [address] [place] [id number]

## 2024-02-13 NOTE — PATIENT INSTRUCTIONS
[facility]  Cardiac Electrophysiology  [address]  [place] [address]   Office: [phone]  Fax: [phone]       Thank you for seeing us in clinic today - it is a pleasure to be a part of your care team.  Below is a summary of our plan from today's visit.      Continue Metoprolol  Aggressive hydration     Please do not hesitate to be in touch with our office at [phone] with any questions that may arise.      Thank you for trusting us with your care,    Sommer Smith MD  Clinical Cardiac Electrophysiology  [facility]  [address]  [place] [address]   Office: [phone]  Fax: [phone]

## 2024-02-13 NOTE — PROGRESS NOTES
HEART CARE ENCOUNTER CONSULTATON NOTE      [facility]  [phone]      Assessment/Recommendations   Assessment/Plan:    Guille Owusu is a very pleasant 63 year old male with PMH of severe MR due to prolapse of posterior mitral valve s/p mitral valve repair with Medtronic 34 mm annuloplasty ring and left atrial appendage excision in May 2023 who presents today to the EP clinic.    NSVT  - Asymptomatic episodes as of now  - Cardiac MRI did not show significant scar burden(minimal fibrosis of the proximal papillary muscles)  - On Metoprolol succinate 12.5 mg currently, unable to up titrate it given orthostasis  - Event monitor showed isolated PVCs, NSVT was noted during cardiac rehab  - ILR implanted in November 2023, 1.3% burden per recent device check    2. Orthostatic hypotension  - Counseled on hydration and exercises prior to getting up from a sitting or lying down position  - Consider wearing compression stockings    Follow up in 12 months    Time spent: 30 minutes spent on the date of the encounter doing chart review, history and exam, documentation and further activities as noted above.         History of Present Illness/Subjective    HPI: Guille Owusu is a very pleasant 63 year old male with PMH of severe MR due to prolapse of posterior mitral valve s/p mitral valve repair with Medtronic 34 mm annuloplasty ring and left atrial appendage excision in May 2023 who presents today to the EP clinic.    July 2023    Dank has recovered well from his mitral valve surgery. He says he is almost back to normal baseline post surgery. He was found to have some PVCs and short runs of wide complex beats suspicious for NSVT during his rehab. He was then recommended to undergo an event monitor which also showed one instance of 8 beats of wide complex rhythm, likely NSVT. He has been asymptomatic during these episodes. He does have orthostatic symptoms and has been diligent about his hydration.  These episodes have since gotten better. No other episodes of light headedness or dizziness or syncope. No family history of sudden cardiac death.    November 2023    Questions were answered about his NSVT, physical activity levels and need for sustained monitoring.     He continues to have orthostatic symptoms but it is getting better. He has symptoms once in a few days as opposed to several times everyday in the past.    February 2024    He has done 3 hike-moderate level in intensity according to his standards- an hour to 3 hours long. He has also been using hand weights everyday, some core exercises. He bought a bike and has done 2 miles rides on it. He does   Has some light headedness after standing from a bending position. Overall he reports an improvement in his orthostatic symptoms.    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    Labs below reviewed personally     Physical Examination  Review of Systems   Vitals: /70 (BP Location: Right arm, Patient Position: Sitting, Cuff Size: Adult Regular)   Pulse 61   Resp 16   Wt 93.4 kg (206 lb)   BMI 29.56 kg/m    BMI= Body mass index is 29.56 kg/m .  Wt Readings from Last 3 Encounters:   02/13/24 93.4 kg (206 lb)   11/10/23 92.5 kg (204 lb)   11/08/23 93.4 kg (206 lb)       General Appearance:   no distress, normal body habitus   ENT/Mouth: membranes moist, no oral lesions or bleeding gums.      EYES:  no scleral icterus, normal conjunctivae   Neck: no carotid bruits or thyromegaly   Chest/Lungs:   lungs are clear to auscultation, no rales or wheezing, + sternal scar, equal chest wall expansion    Cardiovascular:   Regular. Normal first and second heart sounds with no murmurs, rubs, or gallops; the carotid, radial and posterior tibial pulses are intact, no edema bilaterally    Abdomen:   no organomegaly, masses, bruits, or tenderness; bowel sounds are present   Extremities: no cyanosis or clubbing   Skin: no xanthelasma, warm.    Neurologic: normal  bilateral, no tremors     Psychiatric: alert and oriented x3, calm        Please refer above for cardiac ROS details.        Medical History  Surgical History Family History Social History   Past Medical History:   Diagnosis Date    CHELSEY (obstructive sleep apnea)      Past Surgical History:   Procedure Laterality Date    AS REVISE TOTAL HIP REPLACEMENT Left 01/03/2023    CV CORONARY ANGIOGRAM N/A 4/21/2023    Procedure: Coronary Angiogram;  Surgeon: Javier Adames MD;  Location: [facility] CATH LAB CV    CV LEFT HEART CATH N/A 4/21/2023    Procedure: Left Heart Catheterization;  Surgeon: Javier Adames MD;  Location: [facility] CV    EP LOOP RECORDER IMPLANT N/A 11/10/2023    Procedure: Loop Recorder Implant;  Surgeon: Sabina Gonzalez APRN CNP;  Location: [facility] CATH LAB CV    MITRAL VALVE REPAIR N/A 5/2/2023    Procedure: Mitral valve repair with anesthesia transesophageal echocardiogram, left atrial appendage excision, epiaortic ultrasound;  Surgeon: Aniceto Zeng MD;  Location: [facility] [place] OR     No family history on file.     Social History     Socioeconomic History    Marital status:      Spouse name: Not on file    Number of children: Not on file    Years of education: Not on file    Highest education level: Not on file   Occupational History    Not on file   Tobacco Use    Smoking status: Never    Smokeless tobacco: Never   Vaping Use    Vaping Use: Never used   Substance and Sexual Activity    Alcohol use: Yes     Comment: socially    Drug use: Never    Sexual activity: Not Currently   Other Topics Concern    Not on file   Social History Narrative    Not on file     Social Determinants of Health     Financial Resource Strain: Not on file   Food Insecurity: Not on file   Transportation Needs: Not on file    Physical Activity: Not on file   Stress: Not on file   Social Connections: Not on file   Interpersonal Safety: Not on file   Housing Stability: Not on file           Medications  Allergies   Current Outpatient Medications   Medication Sig Dispense Refill    acetaminophen (TYLENOL) 500 MG tablet Take 1-2 tablets (500-1,000 mg) by mouth every 6 hours as needed for mild pain      albuterol (PROAIR HFA/PROVENTIL HFA/VENTOLIN HFA) 108 (90 Base) MCG/ACT inhaler Inhale 1 puff into the lungs as needed      aspirin (ASA) 81 MG chewable tablet Take 1 tablet (81 mg) by mouth daily      cholecalciferol (VITAMIN D3) 10 mcg (400 units) TABS tablet Take 50 mcg by mouth daily      EPINEPHrine (ANY BX GENERIC EQUIV) 0.3 MG/0.3ML injection 2-pack       fish oil-omega-3 fatty acids 1000 MG capsule Take 2,000 mg by mouth daily      ibuprofen (ADVIL/MOTRIN) 200 MG tablet Take 200-400 mg by mouth every 6 hours as needed      loratadine (CLARITIN) 10 MG tablet 1 tab(s) orally once a day      metoprolol succinate ER (TOPROL XL) 25 MG 24 hr tablet TAKE 1/2 TABLET BY MOUTH DAILY 45 tablet 1    multivitamin, therapeutic (THERA-VIT) TABS tablet Take 1 tablet by mouth daily      Respiratory Therapy Supplies (CARETOUCH CPAP & BIPAP HOSE) MISC (CUSTOM Rx) C-PAP MACHINE      rizatriptan (MAXALT) 10 MG tablet 1 tab(s) orally may repeat once in 24 hr, no sooner than 2 hr after the first for migraine, 3 month supply         Allergies   Allergen Reactions    Other Food Allergy Anaphylaxis     Fruits, cherries, apples, banana - almost everything except for melons and citrus    Gluten Meal Headache     Migraines    Horse-Derived Products Other (See Comments)     Other reaction(s): facial swelling  Facial swelling            Lab Results    Chemistry/lipid CBC Cardiac Enzymes/BNP/TSH/INR   Recent Labs   Lab Test 12/10/19  1218   CHOL 167   HDL 49      TRIG 67     Recent Labs   Lab Test 12/10/19  1218 01/26/16  0733    108     Recent Labs    Lab Test 05/23/23  1416      POTASSIUM 4.0   CHLORIDE 103   CO2 25   *   BUN 17.8   CR 1.14   GFRESTIMATED 73   RY 9.4     Recent Labs   Lab Test 05/23/23  1416 05/04/23  0437 05/03/23  0413   CR 1.14 1.24* 1.01     Recent Labs   Lab Test 04/25/23  0831   A1C 5.4          Recent Labs   Lab Test 05/06/23  0513 05/03/23  0413   WBC  --  14.4*   HGB  --  12.2*   HCT  --  35.7*   MCV  --  94    153     Recent Labs   Lab Test 05/03/23  0413 05/02/23  1158 05/02/23  1055   HGB 12.2* 13.7 13.7    No results for input(s): TROPONINI in the last 22690 hours.  Recent Labs   Lab Test 04/16/23  1701   NTBNPI 143     No results for input(s): TSH in the last 73071 hours.  Recent Labs   Lab Test 05/02/23  1158 05/02/23  1052 04/25/23  0831   INR 1.30* 1.38* 1.03        Sommer Smith MD

## 2024-02-14 ENCOUNTER — TELEPHONE (OUTPATIENT)
Dept: CARDIOLOGY | Facility: CLINIC | Age: 64
End: 2024-02-14
Payer: COMMERCIAL

## 2024-02-14 NOTE — TELEPHONE ENCOUNTER
Left detailed message for Pt regarding recommendations from Dr. Ram-BOBBI    ----- Message from Armando Ram MD sent at 2/14/2024  8:31 AM CST -----  Mohini,    Given his exercise test results, I think he can push his exercise open-but not to the extremes.  We talked about this during the exercise test as well.  Please tell him I we will also consult Dr. Gambino when he returns.    Thanks,      ----- Message -----  From: Sommer Smith MD  Sent: 1/30/2024  10:25 AM CST  To: Mohini Owusu; Armando Ram MD; #    There are no current guidelines on the issue of how much exercise he can perform post surgery for his mitral valve prolapse with minimal scar. As of now he has not met criteria for secondary prevention ICD clearly as he has only had short runs of NSVT.   ----- Message -----  From: Armando Ram MD  Sent: 1/26/2024  10:05 AM CST  To: Mohini Owusu; Sommer Smith MD; #    Sommer,    This is a mutual patient. Could you look at his stress test results. He had several short asymptomatic runs of VT (I was at test so that it would not be stopped for short runs). MV gradient with exercise is OK. What do you think about increased exercise for him. He has been holding back because of VT.    Arpan, not urgent but could you look at his MRI? Scarring near papillary muscles described a expected in setting of MV repair. Is that true and would like your thoughts.    Thank you both!!!    Armando

## 2024-04-10 ENCOUNTER — MYC MEDICAL ADVICE (OUTPATIENT)
Dept: CARDIOLOGY | Facility: CLINIC | Age: 64
End: 2024-04-10
Payer: COMMERCIAL

## 2024-04-10 NOTE — TELEPHONE ENCOUNTER
"Call placed to patient to discuss this morning's episode and to review Loop Recorder data. Per patient he was on the ground (on hand/knees) looking at Ipad and taking notes for about 10-15 minutes, did some stretches and then stood rather quickly and became lightheaded, \"room went dark, and I had to quick grab onto wall, but then suddenly started to fall. Landed on floor in empty room that I was getting ready to paint in.\"     State he did not totally lose consciousness but took him a few minutes to get up. States he tried capturing a symptom event with his loop recorder but received 2 messages stating it did not go through. States he had been drinking plenty of water prior to event. Usually takes in 120-150 oz of fluids a day. States he did drink a partial glass of water first thing this morning as well. Does not take Metoprolol in morning, takes at bedtime.     States when able to get up after trying to send he sat down to take his BP which was 96/? And then retook and was 100s/?. Has had similar episode in past but states usually only 4-5 seconds and the resolves, never usually falls. Denies feeling veritgo or room spinning at the time. States he knows he needs to be more cautious about getting up too quickly.     I did have him test his loop recorder symptom button while on phone with him today and this was successful. We did discuss that other than the symptom event not recording earlier, there were no tachy/shiela events recorded automatically by device around that time. Patient is happy to hear this.     Advised to try sending again with any repeat episodes and to call clinic at time so our staff can review. Patient verbalized understanding. Denies any other questions/concerns.     Flora Mcclure RN          2 symptom recordings noted, one was test back on 11/17/23 at post-op, the other was today from test on phone with me (shows NSR with noise artifact) not sent for dizziness as he marked.     Flora Mcclure, " RN

## 2024-05-07 ENCOUNTER — OFFICE VISIT (OUTPATIENT)
Dept: CARDIOLOGY | Facility: CLINIC | Age: 64
End: 2024-05-07
Attending: INTERNAL MEDICINE
Payer: COMMERCIAL

## 2024-05-07 VITALS
SYSTOLIC BLOOD PRESSURE: 102 MMHG | BODY MASS INDEX: 27.54 KG/M2 | WEIGHT: 196.7 LBS | DIASTOLIC BLOOD PRESSURE: 74 MMHG | RESPIRATION RATE: 16 BRPM | HEART RATE: 72 BPM | HEIGHT: 71 IN

## 2024-05-07 DIAGNOSIS — I34.0 NONRHEUMATIC MITRAL VALVE REGURGITATION: ICD-10-CM

## 2024-05-07 DIAGNOSIS — Z98.890 H/O MITRAL VALVE REPAIR: ICD-10-CM

## 2024-05-07 DIAGNOSIS — G47.33 OSA (OBSTRUCTIVE SLEEP APNEA): Primary | ICD-10-CM

## 2024-05-07 DIAGNOSIS — R00.0 WIDE-COMPLEX TACHYCARDIA: ICD-10-CM

## 2024-05-07 PROCEDURE — 99214 OFFICE O/P EST MOD 30 MIN: CPT | Performed by: INTERNAL MEDICINE

## 2024-05-07 NOTE — PROGRESS NOTES
[facility]  [phone]          Assessment/Recommendations   Patient with history of mitral valve repair approximately 1 year ago.  He did have episodes of wide-complex tachycardia which were nonsustained and does have a implantable recorder.  He has been gradually improving from a functional capacity standpoint and does not develop any palpitations.  His exercise capacity is markedly increased and he is lost 12 pounds.  He did have 1 orthostatic episode where he got up quickly and felt lightheaded and went to the ground.  He does not think he passed out.  I think this is related to autonomic dysfunction and orthostatic changes.  He hydrates well and is cautious with quick changes of position now.    Overall patient is doing very well and I have recommended that he can gradually increase his exercise capacity.  I will message our electrophysiologist to see what they feel about tapering off of metoprolol.  He is taking 12.5 mg a day currently.    35 minutes included chart review, patient visit, and documentation.           History of Present Illness/Subjective    Mr. Guille Owusu is a 64 year old male with known mitral valve repair for severe mitral insufficiency.  He had some difficulties with nonsustained ventricular tachycardia postoperatively but no palpitations, or syncopal episodes associated with rhythm disturbances.  He does have an implantable Linq recorder.  He is followed by our electrophysiologist as well.    The patient has been feeling more vigorous and has increased his exercise capacity.  He has lost 12 pounds.  He also bought a bicycle and is planning on biking this summer.  He denies orthopnea, paroxysmal nocturnal dyspnea, peripheral edema, syncope, and had 1 episode where he felt lightheaded and went to the ground but does not feel like he passed out.  This was clearly related to rapid orthostatic change.         Physical Examination Review of Systems   There were no  vitals taken for this visit.  There is no height or weight on file to calculate BMI.  Wt Readings from Last 3 Encounters:   02/13/24 93.4 kg (206 lb)   11/10/23 92.5 kg (204 lb)   11/08/23 93.4 kg (206 lb)     General Appearance:   Alert, cooperative and in no acute distress.   ENT/Mouth: Pink/moist oral mucosa   EYES:  no scleral icterus, normal conjunctivae   Neck: JVP normal. No Hepatojugular reflux. Thyroid not visualized.   Chest/Lungs:   Lungs are clear to auscultation, equal chest wall expansion.   Cardiovascular:   S1, S2 without murmur ,clicks or rubs. Brachial, radial and posterior tibial pulses are intact and symetric. No carotid bruits noted   Abdomen:  Nontender.    Extremities: No cyanosis, clubbing or edema   Skin: no xanthelasma, warm.    Neurologic: normal arm movement bilateral, no tremors     Psychiatric: Appropriate affect.                                                  Medical History  Surgical History Family History Social History   Past Medical History:   Diagnosis Date    CHELSEY (obstructive sleep apnea)     Past Surgical History:   Procedure Laterality Date    AS REVISE TOTAL HIP REPLACEMENT Left 01/03/2023    CV CORONARY ANGIOGRAM N/A 4/21/2023    Procedure: Coronary Angiogram;  Surgeon: Javier Adames MD;  [facility]: [facility]    CV LEFT HEART CATH N/A 4/21/2023    Procedure: Left Heart Catheterization;  Surgeon: Javier Adames MD;  Location: [facility] CV    EP LOOP RECORDER IMPLANT N/A 11/10/2023    Procedure: Loop Recorder Implant;  Surgeon: Sabina Gonzalez APRN CNP;  Location: [facility] CV    MITRAL VALVE REPAIR N/A 5/2/2023    Procedure: Mitral valve repair with anesthesia transesophageal echocardiogram, left atrial appendage excision, epiaortic ultrasound;  Surgeon: Aniceto Zeng MD;  Location: [facility] OR    No family history on file. Social History     Socioeconomic History    Marital status:      Spouse name: Not on file     Number of children: Not on file    Years of education: Not on file    Highest education level: Not on file   Occupational History    Not on file   Tobacco Use    Smoking status: Never    Smokeless tobacco: Never   Vaping Use    Vaping status: Never Used   Substance and Sexual Activity    Alcohol use: Yes     Comment: socially    Drug use: Never    Sexual activity: Not Currently   Other Topics Concern    Not on file   Social History Narrative    Not on file     Social Determinants of Health     Financial Resource Strain: Not on file   Food Insecurity: Not on file   Transportation Needs: Not on file   Physical Activity: Not on file   Stress: Not on file   Social Connections: Not on file   Interpersonal Safety: Not on file   Housing Stability: Not on file          Medications  Allergies   Current Outpatient Medications   Medication Sig Dispense Refill    acetaminophen (TYLENOL) 500 MG tablet Take 1-2 tablets (500-1,000 mg) by mouth every 6 hours as needed for mild pain      albuterol (PROAIR HFA/PROVENTIL HFA/VENTOLIN HFA) 108 (90 Base) MCG/ACT inhaler Inhale 1 puff into the lungs as needed      aspirin (ASA) 81 MG chewable tablet Take 1 tablet (81 mg) by mouth daily      cholecalciferol (VITAMIN D3) 10 mcg (400 units) TABS tablet Take 50 mcg by mouth daily      EPINEPHrine (ANY BX GENERIC EQUIV) 0.3 MG/0.3ML injection 2-pack       fish oil-omega-3 fatty acids 1000 MG capsule Take 2,000 mg by mouth daily      ibuprofen (ADVIL/MOTRIN) 200 MG tablet Take 200-400 mg by mouth every 6 hours as needed      loratadine (CLARITIN) 10 MG tablet 1 tab(s) orally once a day      metoprolol succinate ER (TOPROL XL) 25 MG 24 hr tablet TAKE 1/2 TABLET BY MOUTH DAILY 45 tablet 1    multivitamin, therapeutic (THERA-VIT) TABS tablet Take 1 tablet by mouth daily      Respiratory Therapy Supplies (CARETOUCH CPAP & BIPAP HOSE) MISC (CUSTOM Rx) C-PAP MACHINE      rizatriptan (MAXALT) 10 MG tablet 1 tab(s) orally may repeat once in 24 hr, no  sooner than 2 hr after the first for migraine, 3 month supply      Allergies   Allergen Reactions    Other Food Allergy Anaphylaxis     Fruits, cherries, apples, banana - almost everything except for melons and citrus    Gluten Meal Headache     Migraines    Horse-Derived Products Other (See Comments)     Other reaction(s): facial swelling  Facial swelling           Lab Results    Chemistry/lipid CBC Cardiac Enzymes/BNP/TSH/INR   Lab Results   Component Value Date    CHOL 167 12/10/2019    HDL 49 12/10/2019    TRIG 67 12/10/2019    BUN 17.8 05/23/2023     05/23/2023    CO2 25 05/23/2023    Lab Results   Component Value Date    WBC 14.4 (H) 05/03/2023    HGB 12.2 (L) 05/03/2023    HCT 35.7 (L) 05/03/2023    MCV 94 05/03/2023     05/06/2023    Lab Results   Component Value Date    INR 1.30 (H) 05/02/2023

## 2024-05-07 NOTE — LETTER
5/7/2024    RADHA BENDER MD  [address]  [facility] [address]    RE: Guille Owusu       Dear Colleague,     I had the pleasure of seeing Guille Owusu in the [facility].      [facility]  [phone]          Assessment/Recommendations   Patient with history of mitral valve repair approximately 1 year ago.  He did have episodes of wide-complex tachycardia which were nonsustained and does have a implantable recorder.  He has been gradually improving from a functional capacity standpoint and does not develop any palpitations.  His exercise capacity is markedly increased and he is lost 12 pounds.  He did have 1 orthostatic episode where he got up quickly and felt lightheaded and went to the ground.  He does not think he passed out.  I think this is related to autonomic dysfunction and orthostatic changes.  He hydrates well and is cautious with quick changes of position now.    Overall patient is doing very well and I have recommended that he can gradually increase his exercise capacity.  I will message our electrophysiologist to see what they feel about tapering off of metoprolol.  He is taking 12.5 mg a day currently.    35 minutes included chart review, patient visit, and documentation.           History of Present Illness/Subjective    Mr. Guille Owusu is a 64 year old male with known mitral valve repair for severe mitral insufficiency.  He had some difficulties with nonsustained ventricular tachycardia postoperatively but no palpitations, or syncopal episodes associated with rhythm disturbances.  He does have an implantable Linq recorder.  He is followed by our electrophysiologist as well.    The patient has been feeling more vigorous and has increased his exercise capacity.  He has lost 12 pounds.  He also bought a bicycle and is planning on biking this summer.  He denies orthopnea, paroxysmal nocturnal dyspnea, peripheral edema, syncope, and had 1  episode where he felt lightheaded and went to the ground but does not feel like he passed out.  This was clearly related to rapid orthostatic change.         Physical Examination Review of Systems   There were no vitals taken for this visit.  There is no height or weight on file to calculate BMI.  Wt Readings from Last 3 Encounters:   02/13/24 93.4 kg (206 lb)   11/10/23 92.5 kg (204 lb)   11/08/23 93.4 kg (206 lb)     General Appearance:   Alert, cooperative and in no acute distress.   ENT/Mouth: Pink/moist oral mucosa   EYES:  no scleral icterus, normal conjunctivae   Neck: JVP normal. No Hepatojugular reflux. Thyroid not visualized.   Chest/Lungs:   Lungs are clear to auscultation, equal chest wall expansion.   Cardiovascular:   S1, S2 without murmur ,clicks or rubs. Brachial, radial and posterior tibial pulses are intact and symetric. No carotid bruits noted   Abdomen:  Nontender.    Extremities: No cyanosis, clubbing or edema   Skin: no xanthelasma, warm.    Neurologic: normal arm movement bilateral, no tremors     Psychiatric: Appropriate affect.                                                  Medical History  Surgical History Family History Social History   Past Medical History:   Diagnosis Date    CHELSEY (obstructive sleep apnea)     Past Surgical History:   Procedure Laterality Date    AS REVISE TOTAL HIP REPLACEMENT Left 01/03/2023    CV CORONARY ANGIOGRAM N/A 4/21/2023    Procedure: Coronary Angiogram;  Surgeon: Javier Adames MD;  Location: [facility]    CV LEFT HEART CATH N/A 4/21/2023    Procedure: Left Heart Catheterization;  Surgeon: Javier Adames MD;  Location: [facility] CV    EP LOOP RECORDER IMPLANT N/A 11/10/2023    Procedure: Loop Recorder Implant;  Surgeon: Sabina Gonzalez APRN CNP;  Location: [facility] CV    MITRAL VALVE REPAIR N/A 5/2/2023    Procedure: Mitral valve repair with anesthesia transesophageal echocardiogram, left atrial appendage excision, epiaortic  ultrasound;  Surgeon: Aniceto Zeng MD;  Location: [facility] OR    No family history on file. Social History     Socioeconomic History    Marital status:      Spouse name: Not on file    Number of children: Not on file    Years of education: Not on file    Highest education level: Not on file   Occupational History    Not on file   Tobacco Use    Smoking status: Never    Smokeless tobacco: Never   Vaping Use    Vaping status: Never Used   Substance and Sexual Activity    Alcohol use: Yes     Comment: socially    Drug use: Never    Sexual activity: Not Currently   Other Topics Concern    Not on file   Social History Narrative    Not on file     Social Determinants of Health     Financial Resource Strain: Not on file   Food Insecurity: Not on file   Transportation Needs: Not on file   Physical Activity: Not on file   Stress: Not on file   Social Connections: Not on file   Interpersonal Safety: Not on file   Housing Stability: Not on file          Medications  Allergies   Current Outpatient Medications   Medication Sig Dispense Refill    acetaminophen (TYLENOL) 500 MG tablet Take 1-2 tablets (500-1,000 mg) by mouth every 6 hours as needed for mild pain      albuterol (PROAIR HFA/PROVENTIL HFA/VENTOLIN HFA) 108 (90 Base) MCG/ACT inhaler Inhale 1 puff into the lungs as needed      aspirin (ASA) 81 MG chewable tablet Take 1 tablet (81 mg) by mouth daily      cholecalciferol (VITAMIN D3) 10 mcg (400 units) TABS tablet Take 50 mcg by mouth daily      EPINEPHrine (ANY BX GENERIC EQUIV) 0.3 MG/0.3ML injection 2-pack       fish oil-omega-3 fatty acids 1000 MG capsule Take 2,000 mg by mouth daily      ibuprofen (ADVIL/MOTRIN) 200 MG tablet Take 200-400 mg by mouth every 6 hours as needed      loratadine (CLARITIN) 10 MG tablet 1 tab(s) orally once a day      metoprolol succinate ER (TOPROL XL) 25 MG 24 hr tablet TAKE 1/2 TABLET BY MOUTH DAILY 45 tablet 1    multivitamin, therapeutic (THERA-VIT) TABS  tablet Take 1 tablet by mouth daily      Respiratory Therapy Supplies (CARETOUCH CPAP & BIPAP HOSE) MISC (CUSTOM Rx) C-PAP MACHINE      rizatriptan (MAXALT) 10 MG tablet 1 tab(s) orally may repeat once in 24 hr, no sooner than 2 hr after the first for migraine, 3 month supply      Allergies   Allergen Reactions    Other Food Allergy Anaphylaxis     Fruits, cherries, apples, banana - almost everything except for melons and citrus    Gluten Meal Headache     Migraines    Horse-Derived Products Other (See Comments)     Other reaction(s): facial swelling  Facial swelling           Lab Results    Chemistry/lipid CBC Cardiac Enzymes/BNP/TSH/INR   Lab Results   Component Value Date    CHOL 167 12/10/2019    HDL 49 12/10/2019    TRIG 67 12/10/2019    BUN 17.8 05/23/2023     05/23/2023    CO2 25 05/23/2023    Lab Results   Component Value Date    WBC 14.4 (H) 05/03/2023    HGB 12.2 (L) 05/03/2023    HCT 35.7 (L) 05/03/2023    MCV 94 05/03/2023     05/06/2023    Lab Results   Component Value Date    INR 1.30 (H) 05/02/2023              Thank you for allowing me to participate in the care of your patient.      Sincerely,     Armando Ram MD     [facility]  cc:   Armando Ram MD  [address]  [place] [address]

## 2024-05-08 ENCOUNTER — TELEPHONE (OUTPATIENT)
Dept: CARDIOLOGY | Facility: CLINIC | Age: 64
End: 2024-05-08
Payer: COMMERCIAL

## 2024-05-08 NOTE — TELEPHONE ENCOUNTER
Pt expressed understanding, no further questions-ted      ----- Message from Armando Ram MD sent at 5/8/2024  6:57 AM CDT -----  Mohini,    Would change Toprol to every other day for 2 weeks, then stop it.  He should let us know if he has any changes in his functional capacity in 1 month.  He can text me as well.        ThanksArmando  ----- Message -----  From: Sommer Smith MD  Sent: 5/7/2024   9:05 AM CDT  To: Armando Ram MD    Should be okay to stop it.  Continue monitoring via ILR  ----- Message -----  From: Armando Ram MD  Sent: 5/7/2024   9:01 AM CDT  To: MD Sommer Drew,    Patient above is still taking 12.5 mg of Toprol.  He is wondering if he could taper off of this medication.  As you recall, he has had some nonsustained short runs of wide-complex tachycardia and does have a Linq recorder implanted with no notifications at current settings.    Patient is doing much better from a physical activity standpoint and is also lost 12 pounds.    Thank you for your advice.    Thanks,    Armando

## 2024-05-13 ENCOUNTER — ANCILLARY PROCEDURE (OUTPATIENT)
Dept: CARDIOLOGY | Facility: CLINIC | Age: 64
End: 2024-05-13
Attending: INTERNAL MEDICINE
Payer: COMMERCIAL

## 2024-05-13 DIAGNOSIS — I47.29 NON-SUSTAINED VENTRICULAR TACHYCARDIA (H): ICD-10-CM

## 2024-05-13 DIAGNOSIS — Z95.818 STATUS POST PLACEMENT OF IMPLANTABLE LOOP RECORDER: ICD-10-CM

## 2024-05-13 DIAGNOSIS — R00.0 WIDE-COMPLEX TACHYCARDIA: ICD-10-CM

## 2024-05-19 PROCEDURE — 93298 REM INTERROG DEV EVAL SCRMS: CPT | Performed by: INTERNAL MEDICINE

## 2024-06-30 DIAGNOSIS — I34.0 MITRAL REGURGITATION: ICD-10-CM

## 2024-06-30 DIAGNOSIS — I34.0 MITRAL VALVE INSUFFICIENCY: ICD-10-CM

## 2024-07-01 RX ORDER — METOPROLOL SUCCINATE 25 MG/1
TABLET, EXTENDED RELEASE ORAL
Qty: 45 TABLET | Refills: 1 | OUTPATIENT
Start: 2024-07-01

## 2024-08-12 ENCOUNTER — ANCILLARY PROCEDURE (OUTPATIENT)
Dept: CARDIOLOGY | Facility: CLINIC | Age: 64
End: 2024-08-12
Attending: INTERNAL MEDICINE
Payer: COMMERCIAL

## 2024-08-12 DIAGNOSIS — I47.29 NON-SUSTAINED VENTRICULAR TACHYCARDIA (H): ICD-10-CM

## 2024-08-12 DIAGNOSIS — Z95.818 STATUS POST PLACEMENT OF IMPLANTABLE LOOP RECORDER: ICD-10-CM

## 2024-08-12 DIAGNOSIS — R00.0 WIDE-COMPLEX TACHYCARDIA: ICD-10-CM

## 2024-08-13 PROCEDURE — 93298 REM INTERROG DEV EVAL SCRMS: CPT | Performed by: INTERNAL MEDICINE

## 2024-11-11 ENCOUNTER — ANCILLARY PROCEDURE (OUTPATIENT)
Dept: CARDIOLOGY | Facility: CLINIC | Age: 64
End: 2024-11-11
Attending: INTERNAL MEDICINE
Payer: COMMERCIAL

## 2024-11-11 DIAGNOSIS — I47.29 NON-SUSTAINED VENTRICULAR TACHYCARDIA (H): ICD-10-CM

## 2024-11-11 DIAGNOSIS — R00.0 WIDE-COMPLEX TACHYCARDIA: ICD-10-CM

## 2024-11-11 DIAGNOSIS — Z95.818 STATUS POST PLACEMENT OF IMPLANTABLE LOOP RECORDER: ICD-10-CM

## 2024-12-07 ENCOUNTER — HEALTH MAINTENANCE LETTER (OUTPATIENT)
Age: 64
End: 2024-12-07

## 2025-02-14 ENCOUNTER — ANCILLARY PROCEDURE (OUTPATIENT)
Dept: CARDIOLOGY | Facility: CLINIC | Age: 65
End: 2025-02-14
Attending: INTERNAL MEDICINE
Payer: COMMERCIAL

## 2025-02-14 DIAGNOSIS — I47.20 VT (VENTRICULAR TACHYCARDIA) (H): ICD-10-CM

## 2025-02-14 DIAGNOSIS — Z45.09 ENCOUNTER FOR LOOP RECORDER CHECK: ICD-10-CM

## 2025-02-14 PROCEDURE — 93298 REM INTERROG DEV EVAL SCRMS: CPT | Performed by: INTERNAL MEDICINE

## 2025-02-18 ENCOUNTER — OFFICE VISIT (OUTPATIENT)
Dept: CARDIOLOGY | Facility: CLINIC | Age: 65
End: 2025-02-18
Attending: INTERNAL MEDICINE
Payer: COMMERCIAL

## 2025-02-18 VITALS
BODY MASS INDEX: 27.2 KG/M2 | SYSTOLIC BLOOD PRESSURE: 112 MMHG | HEART RATE: 73 BPM | RESPIRATION RATE: 16 BRPM | DIASTOLIC BLOOD PRESSURE: 72 MMHG | WEIGHT: 195 LBS

## 2025-02-18 DIAGNOSIS — I47.29 NON-SUSTAINED VENTRICULAR TACHYCARDIA (H): ICD-10-CM

## 2025-02-18 PROCEDURE — 99214 OFFICE O/P EST MOD 30 MIN: CPT | Performed by: INTERNAL MEDICINE

## 2025-02-18 NOTE — PATIENT INSTRUCTIONS
[facility]  Cardiac Electrophysiology  [address]  [place] [address]   Office: [phone]  Fax: [phone]       Thank you for seeing us in clinic today - it is a pleasure to be a part of your care team.  Below is a summary of our plan from today's visit.      - Check BP daily at different times  - Assess daily salt intake  - Increase salt intake by 20% if BP(top) is 100 or lower  - Consider wearing compression stockings  - follow up with general cardiology    Please do not hesitate to be in touch with our office at [phone] with any questions that may arise.      Thank you for trusting us with your care,    Sommer Smith MD  Clinical Cardiac Electrophysiology  [facility]  [address]  [place] [address]   Office: [phone]  Fax: [phone]

## 2025-02-18 NOTE — PROGRESS NOTES
HEART CARE ENCOUNTER CONSULTATON NOTE      [facility]  [phone]      Assessment/Recommendations   Assessment/Plan:    Guille Owusu is a very pleasant 64 year old male with PMH of severe MR due to prolapse of posterior mitral valve s/p mitral valve repair with Medtronic 34 mm annuloplasty ring and left atrial appendage excision in May 2023 who presents today to the EP clinic.    NSVT  - Asymptomatic episodes as of now  - Cardiac MRI did not show significant scar burden(minimal fibrosis of the proximal papillary muscles)  - On Metoprolol succinate 12.5 mg currently, unable to up titrate it given orthostasis  - Event monitor showed isolated PVCs, NSVT was noted during cardiac rehab  - ILR implanted in November 2023, 1.3% burden per recent device check    2. Orthostatic hypotension  - Counseled on hydration and exercises prior to getting up from a sitting or lying down position  - Check BP daily at different times  - Assess salt intake  - Increase salt intake by 20% if SBP is 100 or lower at home  - Consider wearing compression stockings    Follow up on as needed basis    Time spent: 30 minutes spent on the date of the encounter doing chart review, history and exam, documentation and further activities as noted above.         History of Present Illness/Subjective    HPI: Guille Owusu is a very pleasant 64 year old male with PMH of severe MR due to prolapse of posterior mitral valve s/p mitral valve repair with Medtronic 34 mm annuloplasty ring and left atrial appendage excision in May 2023 who presents today to the EP clinic.    July 2023    Dank has recovered well from his mitral valve surgery. He says he is almost back to normal baseline post surgery. He was found to have some PVCs and short runs of wide complex beats suspicious for NSVT during his rehab. He was then recommended to undergo an event monitor which also showed one instance of 8 beats of wide complex rhythm, likely NSVT.  He has been asymptomatic during these episodes. He does have orthostatic symptoms and has been diligent about his hydration. These episodes have since gotten better. No other episodes of light headedness or dizziness or syncope. No family history of sudden cardiac death.    November 2023    Questions were answered about his NSVT, physical activity levels and need for sustained monitoring.     He continues to have orthostatic symptoms but it is getting better. He has symptoms once in a few days as opposed to several times everyday in the past.    February 2024    He has done 3 hike-moderate level in intensity according to his standards- an hour to 3 hours long. He has also been using hand weights everyday, some core exercises. He bought a bike and has done 2 miles rides on it. He does   Has some light headedness after standing from a bending position. Overall he reports an improvement in his orthostatic symptoms.    February 2025  He returns today for a follow up visit. He says he has symptoms of pre syncope when he bends or gets up from a sitting position. He had one episode where he came close to passing out. These symptoms occur every 2 days.   He drinks about  oz of water. He does not check his BP.     He rides a stationary bike and walks in his neighborhood on a regular basis.    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    Labs below reviewed personally     Physical Examination  Review of Systems   Vitals: /72 (BP Location: Left arm, Patient Position: Sitting, Cuff Size: Adult Regular)   Pulse 73   Resp 16   Wt 88.5 kg (195 lb)   BMI 27.20 kg/m    BMI= Body mass index is 27.2 kg/m .  Wt Readings from Last 3 Encounters:   02/18/25 88.5 kg (195 lb)   05/07/24 89.2 kg (196 lb 11.2 oz)   02/13/24 93.4 kg (206 lb)       General  Appearance:   no distress, normal body habitus   ENT/Mouth: membranes moist, no oral lesions or bleeding gums.      EYES:  no scleral icterus, normal conjunctivae   Neck: no carotid bruits or thyromegaly   Chest/Lungs:   lungs are clear to auscultation, no rales or wheezing, + sternal scar, equal chest wall expansion    Cardiovascular:   Regular. Normal first and second heart sounds with no murmurs, rubs, or gallops; the carotid, radial and posterior tibial pulses are intact, no edema bilaterally    Abdomen:  no organomegaly, masses, bruits, or tenderness; bowel sounds are present   Extremities: no cyanosis or clubbing   Skin: no xanthelasma, warm.    Neurologic: normal  bilateral, no tremors     Psychiatric: alert and oriented x3, calm        Please refer above for cardiac ROS details.        Medical History  Surgical History Family History Social History   Past Medical History:   Diagnosis Date    CHELSEY (obstructive sleep apnea)      Past Surgical History:   Procedure Laterality Date    AS REVISE TOTAL HIP REPLACEMENT Left 01/03/2023    CV CORONARY ANGIOGRAM N/A 4/21/2023    Procedure: Coronary Angiogram;  Surgeon: Javier Adames MD;  Location: [facility]    CV LEFT HEART CATH N/A 4/21/2023    Procedure: Left Heart Catheterization;  Surgeon: Javier Adames MD;  Location: [facility]    EP LOOP RECORDER IMPLANT N/A 11/10/2023    Procedure: Loop Recorder Implant;  Surgeon: Sabina Gonzalez APRN CNP;  Location: [facility]    MITRAL VALVE REPAIR N/A 5/2/2023    Procedure: Mitral valve repair with anesthesia transesophageal echocardiogram, left atrial appendage excision, epiaortic ultrasound;  Surgeon: Aniceto Zeng MD;  Location: [facility] OR     No family history on file.     Social History     Socioeconomic History    Marital status:      Spouse name: Not on file    Number of children: Not on file    Years of education: Not on file    Highest education  level: Not on file   Occupational History    Not on file   Tobacco Use    Smoking status: Never    Smokeless tobacco: Never   Vaping Use    Vaping status: Never Used   Substance and Sexual Activity    Alcohol use: Yes     Comment: socially    Drug use: Never    Sexual activity: Not Currently   Other Topics Concern    Not on file   Social History Narrative    Not on file     Social Drivers of Health     Financial Resource Strain: Not on file   Food Insecurity: No Food Insecurity (1/4/2025)    Received from [facility]    Hunger Vital Sign     Worried About Running Out of Food in the Last Year: Never true     Ran Out of Food in the Last Year: Never true   Transportation Needs: No Transportation Needs (1/4/2025)    Received from [facility]    PRAPARE - Transportation     Lack of Transportation (Medical): No     Lack of Transportation (Non-Medical): No   Physical Activity: Not on file   Stress: Not on file   Social Connections: Not on file   Interpersonal Safety: Not on file   Housing Stability: Low Risk  (1/4/2025)    Received from [facility]    Housing Stability Vital Sign     Unable to Pay for Housing in the Last Year: No     Number of Times Moved in the Last Year: 0     Homeless in the Last Year: No           Medications  Allergies   Current Outpatient Medications   Medication Sig Dispense Refill    albuterol (PROAIR HFA/PROVENTIL HFA/VENTOLIN HFA) 108 (90 Base) MCG/ACT inhaler Inhale 1 puff into the lungs as needed      aspirin (ASA) 81 MG chewable tablet Take 1 tablet (81 mg) by mouth daily      cholecalciferol (VITAMIN D3) 10 mcg (400 units) TABS tablet Take 50 mcg by mouth daily      EPINEPHrine (ANY BX GENERIC EQUIV) 0.3 MG/0.3ML injection 2-pack Inject 0.3 mg into the muscle as needed      fish oil-omega-3 fatty acids 1000 MG capsule Take 2,000 mg by mouth daily      loratadine (CLARITIN) 10 MG tablet 1 tab(s) orally once a day      multivitamin, therapeutic (THERA-VIT) TABS tablet Take 1 tablet by  mouth daily      Respiratory Therapy Supplies (CARETOUCH CPAP & BIPAP HOSE) MISC (CUSTOM Rx) C-PAP MACHINE      rizatriptan (MAXALT) 10 MG tablet 1 tab(s) orally may repeat once in 24 hr, no sooner than 2 hr after the first for migraine, 3 month supply      acetaminophen (TYLENOL) 500 MG tablet Take 1-2 tablets (500-1,000 mg) by mouth every 6 hours as needed for mild pain      ibuprofen (ADVIL/MOTRIN) 200 MG tablet Take 200-400 mg by mouth every 6 hours as needed         Allergies   Allergen Reactions    Other Food Allergy Anaphylaxis     Fruits, cherries, apples, banana - almost everything except for melons and citrus    Gluten Meal Headache     Migraines    Horse-Derived Products Other (See Comments)     Other reaction(s): facial swelling  Facial swelling            Lab Results    Chemistry/lipid CBC Cardiac Enzymes/BNP/TSH/INR   Recent Labs   Lab Test 12/10/19  1218   CHOL 167   HDL 49      TRIG 67     Recent Labs   Lab Test 12/10/19  1218 01/26/16  0733    108     Recent Labs   Lab Test 05/23/23  1416      POTASSIUM 4.0   CHLORIDE 103   CO2 25   *   BUN 17.8   CR 1.14   GFRESTIMATED 73   RY 9.4     Recent Labs   Lab Test 05/23/23  1416 05/04/23  0437 05/03/23  0413   CR 1.14 1.24* 1.01     Recent Labs   Lab Test 04/25/23  0831   A1C 5.4          Recent Labs   Lab Test 05/06/23  0513 05/03/23  0413   WBC  --  14.4*   HGB  --  12.2*   HCT  --  35.7*   MCV  --  94    153     Recent Labs   Lab Test 05/03/23  0413 05/02/23  1158 05/02/23  1055   HGB 12.2* 13.7 13.7    No results for input(s): TROPONINI in the last 25970 hours.  Recent Labs   Lab Test 04/16/23  1701   NTBNPI 143     No results for input(s): TSH in the last 42974 hours.  Recent Labs   Lab Test 05/02/23  1158 05/02/23  1052 04/25/23  0831   INR 1.30* 1.38* 1.03        Sommer Smith MD

## 2025-02-18 NOTE — LETTER
2/18/2025    RADHA BENDER MD  [address]  [facility] [address]    RE: Guille Owusu       Dear Colleague,     I had the pleasure of seeing Guille Owusu in the [facility].    HEART CARE ENCOUNTER CONSULTATON NOTE      [facility]  [phone]      Assessment/Recommendations   Assessment/Plan:    Guille Owusu is a very pleasant 64 year old male with PMH of severe MR due to prolapse of posterior mitral valve s/p mitral valve repair with Medtronic 34 mm annuloplasty ring and left atrial appendage excision in May 2023 who presents today to the EP clinic.    NSVT  - Asymptomatic episodes as of now  - Cardiac MRI did not show significant scar burden(minimal fibrosis of the proximal papillary muscles)  - On Metoprolol succinate 12.5 mg currently, unable to up titrate it given orthostasis  - Event monitor showed isolated PVCs, NSVT was noted during cardiac rehab  - ILR implanted in November 2023, 1.3% burden per recent device check    2. Orthostatic hypotension  - Counseled on hydration and exercises prior to getting up from a sitting or lying down position  - Check BP daily at different times  - Assess salt intake  - Increase salt intake by 20% if SBP is 100 or lower at home  - Consider wearing compression stockings    Follow up on as needed basis    Time spent: 30 minutes spent on the date of the encounter doing chart review, history and exam, documentation and further activities as noted above.         History of Present Illness/Subjective    HPI: Guille Owusu is a very pleasant 64 year old male with PMH of severe MR due to prolapse of posterior mitral valve s/p mitral valve repair with Medtronic 34 mm annuloplasty ring and left atrial appendage excision in May 2023 who presents today to the EP clinic.    July 2023    Dank has recovered well from his mitral valve surgery. He says he is almost back to normal baseline post surgery. He was found to have some  PVCs and short runs of wide complex beats suspicious for NSVT during his rehab. He was then recommended to undergo an event monitor which also showed one instance of 8 beats of wide complex rhythm, likely NSVT. He has been asymptomatic during these episodes. He does have orthostatic symptoms and has been diligent about his hydration. These episodes have since gotten better. No other episodes of light headedness or dizziness or syncope. No family history of sudden cardiac death.    November 2023    Questions were answered about his NSVT, physical activity levels and need for sustained monitoring.     He continues to have orthostatic symptoms but it is getting better. He has symptoms once in a few days as opposed to several times everyday in the past.    February 2024    He has done 3 hike-moderate level in intensity according to his standards- an hour to 3 hours long. He has also been using hand weights everyday, some core exercises. He bought a bike and has done 2 miles rides on it. He does   Has some light headedness after standing from a bending position. Overall he reports an improvement in his orthostatic symptoms.    February 2025  He returns today for a follow up visit. He says he has symptoms of pre syncope when he bends or gets up from a sitting position. He had one episode where he came close to passing out. These symptoms occur every 2 days.   He drinks about  oz of water. He does not check his BP.     He rides a stationary bike and walks in his neighborhood on a regular basis.    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    Labs below reviewed personally     Physical Examination  Review of Systems   Vitals: /72 (BP Location: Left arm, Patient Position: Sitting, Cuff Size: Adult Regular)   Pulse 73   Resp 16   Wt 88.5 kg  (195 lb)   BMI 27.20 kg/m    BMI= Body mass index is 27.2 kg/m .  Wt Readings from Last 3 Encounters:   02/18/25 88.5 kg (195 lb)   05/07/24 89.2 kg (196 lb 11.2 oz)   02/13/24 93.4 kg (206 lb)       General Appearance:   no distress, normal body habitus   ENT/Mouth: membranes moist, no oral lesions or bleeding gums.      EYES:  no scleral icterus, normal conjunctivae   Neck: no carotid bruits or thyromegaly   Chest/Lungs:   lungs are clear to auscultation, no rales or wheezing, + sternal scar, equal chest wall expansion    Cardiovascular:   Regular. Normal first and second heart sounds with no murmurs, rubs, or gallops; the carotid, radial and posterior tibial pulses are intact, no edema bilaterally    Abdomen:  no organomegaly, masses, bruits, or tenderness; bowel sounds are present   Extremities: no cyanosis or clubbing   Skin: no xanthelasma, warm.    Neurologic: normal  bilateral, no tremors     Psychiatric: alert and oriented x3, calm        Please refer above for cardiac ROS details.        Medical History  Surgical History Family History Social History   Past Medical History:   Diagnosis Date     CHELSEY (obstructive sleep apnea)      Past Surgical History:   Procedure Laterality Date     AS REVISE TOTAL HIP REPLACEMENT Left 01/03/2023     CV CORONARY ANGIOGRAM N/A 4/21/2023    Procedure: Coronary Angiogram;  Surgeon: Javier Adames MD;  Location: [facility]     CV LEFT HEART CATH N/A 4/21/2023    Procedure: Left Heart Catheterization;  Surgeon: Javier Adames MD;  Location: [facility] CV     EP LOOP RECORDER IMPLANT N/A 11/10/2023    Procedure: Loop Recorder Implant;  Surgeon: Sabina Gonzalez APRN CNP;  Location: [facility] CV     MITRAL VALVE REPAIR N/A 5/2/2023    Procedure: Mitral valve repair with anesthesia transesophageal echocardiogram, left atrial appendage excision, epiaortic ultrasound;  Surgeon: Aniceto Zeng MD;  Location: [facility] OR     [facility]  history on file.     Social History     Socioeconomic History     Marital status:      Spouse name: Not on file     Number of children: Not on file     Years of education: Not on file     Highest education level: Not on file   Occupational History     Not on file   Tobacco Use     Smoking status: Never     Smokeless tobacco: Never   Vaping Use     Vaping status: Never Used   Substance and Sexual Activity     Alcohol use: Yes     Comment: socially     Drug use: Never     Sexual activity: Not Currently   Other Topics Concern     Not on file   Social History Narrative     Not on file     Social Drivers of Health     Financial Resource Strain: Not on file   Food Insecurity: No Food Insecurity (1/4/2025)    Received from [facility]    Hunger Vital Sign      Worried About Running Out of Food in the Last Year: Never true      Ran Out of Food in the Last Year: Never true   Transportation Needs: No Transportation Needs (1/4/2025)    Received from [facility]    PRAPARE - Transportation      Lack of Transportation (Medical): No      Lack of Transportation (Non-Medical): No   Physical Activity: Not on file   Stress: Not on file   Social Connections: Not on file   Interpersonal Safety: Not on file   Housing Stability: Low Risk  (1/4/2025)    Received from [facility]    Housing Stability Vital Sign      Unable to Pay for Housing in the Last Year: No      Number of Times Moved in the Last Year: 0      Homeless in the Last Year: No           Medications  Allergies   Current Outpatient Medications   Medication Sig Dispense Refill     albuterol (PROAIR HFA/PROVENTIL HFA/VENTOLIN HFA) 108 (90 Base) MCG/ACT inhaler Inhale 1 puff into the lungs as needed       aspirin (ASA) 81 MG chewable tablet Take 1 tablet (81 mg) by mouth daily       cholecalciferol (VITAMIN D3) 10 mcg (400 units) TABS tablet Take 50 mcg by mouth daily       EPINEPHrine (ANY BX GENERIC EQUIV) 0.3 MG/0.3ML injection 2-pack Inject 0.3 mg into the  muscle as needed       fish oil-omega-3 fatty acids 1000 MG capsule Take 2,000 mg by mouth daily       loratadine (CLARITIN) 10 MG tablet 1 tab(s) orally once a day       multivitamin, therapeutic (THERA-VIT) TABS tablet Take 1 tablet by mouth daily       Respiratory Therapy Supplies (CARETOUCH CPAP & BIPAP HOSE) MISC (CUSTOM Rx) C-PAP MACHINE       rizatriptan (MAXALT) 10 MG tablet 1 tab(s) orally may repeat once in 24 hr, no sooner than 2 hr after the first for migraine, 3 month supply       acetaminophen (TYLENOL) 500 MG tablet Take 1-2 tablets (500-1,000 mg) by mouth every 6 hours as needed for mild pain       ibuprofen (ADVIL/MOTRIN) 200 MG tablet Take 200-400 mg by mouth every 6 hours as needed         Allergies   Allergen Reactions     Other Food Allergy Anaphylaxis     Fruits, cherries, apples, banana - almost everything except for melons and citrus     Gluten Meal Headache     Migraines     Horse-Derived Products Other (See Comments)     Other reaction(s): facial swelling  Facial swelling            Lab Results    Chemistry/lipid CBC Cardiac Enzymes/BNP/TSH/INR   Recent Labs   Lab Test 12/10/19  1218   CHOL 167   HDL 49      TRIG 67     Recent Labs   Lab Test 12/10/19  1218 01/26/16  0733    108     Recent Labs   Lab Test 05/23/23  1416      POTASSIUM 4.0   CHLORIDE 103   CO2 25   *   BUN 17.8   CR 1.14   GFRESTIMATED 73   RY 9.4     Recent Labs   Lab Test 05/23/23  1416 05/04/23  0437 05/03/23  0413   CR 1.14 1.24* 1.01     Recent Labs   Lab Test 04/25/23  0831   A1C 5.4          Recent Labs   Lab Test 05/06/23  0513 05/03/23  0413   WBC  --  14.4*   HGB  --  12.2*   HCT  --  35.7*   MCV  --  94    153     Recent Labs   Lab Test 05/03/23  0413 05/02/23  1158 05/02/23  1055   HGB 12.2* 13.7 13.7    No results for input(s): TROPONINI in the last 11338 hours.  Recent Labs   Lab Test 04/16/23  1701   NTBNPI 143     No results for input(s): TSH in the last 33608 hours.  Recent  Labs   Lab Test 05/02/23  1158 05/02/23  1052 04/25/23  0831   INR 1.30* 1.38* 1.03        Sommer Smith MD                                        Thank you for allowing me to participate in the care of your patient.      Sincerely,     Sommer Smith MD     [facility]  cc:   Sommer Smith MD  [address] [place] [id number]

## 2025-05-16 ENCOUNTER — ANCILLARY PROCEDURE (OUTPATIENT)
Dept: CARDIOLOGY | Facility: CLINIC | Age: 65
End: 2025-05-16
Attending: INTERNAL MEDICINE
Payer: COMMERCIAL

## 2025-05-16 DIAGNOSIS — I47.20 VT (VENTRICULAR TACHYCARDIA) (H): ICD-10-CM

## 2025-05-16 DIAGNOSIS — Z45.09 ENCOUNTER FOR LOOP RECORDER CHECK: ICD-10-CM

## 2025-05-16 PROCEDURE — 93298 REM INTERROG DEV EVAL SCRMS: CPT | Performed by: INTERNAL MEDICINE

## 2025-08-15 ENCOUNTER — ANCILLARY PROCEDURE (OUTPATIENT)
Dept: CARDIOLOGY | Facility: CLINIC | Age: 65
End: 2025-08-15
Attending: INTERNAL MEDICINE
Payer: COMMERCIAL

## 2025-08-15 DIAGNOSIS — I47.20 VT (VENTRICULAR TACHYCARDIA) (H): ICD-10-CM

## 2025-08-15 DIAGNOSIS — Z45.09 ENCOUNTER FOR LOOP RECORDER CHECK: ICD-10-CM

## 2025-08-15 PROCEDURE — 93298 REM INTERROG DEV EVAL SCRMS: CPT | Performed by: INTERNAL MEDICINE

## (undated) DEVICE — PLATE GROUNDING ADULT W/CORD 9165L

## (undated) DEVICE — Device

## (undated) DEVICE — SPONGE LAP 18X18" X8435

## (undated) DEVICE — ELECTRODE ADULT PACING MULTI P-211-M1

## (undated) DEVICE — SYR ANGIOGRAPHY MULTIUSE KIT ACIST 014612

## (undated) DEVICE — SU DEVICE ENDO COR KNOT QUICK LOAD RELOAD 030874

## (undated) DEVICE — SUCTION DRY CHEST DRAIN OASIS 3600-100

## (undated) DEVICE — CATH SUCTION 14FR W/O CTRL DYND41962

## (undated) DEVICE — SHTH INTRO 0.021IN ID 6FR DIA

## (undated) DEVICE — SU UMBILICAL TAPE .125X30" U11T

## (undated) DEVICE — TRAY CATH SURESTEP OD14 FR INTERMITTENT STER LTX INTS14

## (undated) DEVICE — GLOVE BIOGEL PI SZ 7.0 40870

## (undated) DEVICE — PITCHER STERILE 1000ML  SSK9004A

## (undated) DEVICE — SLEEVE TR BAND RADIAL COMPRESSION DEVICE 24CM TRB24-REG

## (undated) DEVICE — SU PLEDGET SOFT TFE 3/8"X3/26"X1/16" PCP40

## (undated) DEVICE — SOL WATER IRRIG 1000ML BOTTLE 2F7114

## (undated) DEVICE — PACK MINOR SINGLE BASIN SSK3001

## (undated) DEVICE — CELL SAVER

## (undated) DEVICE — SET CANNULATION TOUNIQUET TS-10061

## (undated) DEVICE — SU PROLENE 4-0 SHDA 36" 8521H

## (undated) DEVICE — CATH THORACIC STRAIGHT CLOTSTOP 28FR

## (undated) DEVICE — GOWN IMPERVIOUS BREATHABLE SMART XLG 89045

## (undated) DEVICE — CONNECTOR BLAKE DRAIN SGL

## (undated) DEVICE — CUSTOM PACK CORONARY SAN5BCRHEA

## (undated) DEVICE — SOL NACL 0.9% IRRIG 1000ML BOTTLE 2F7124

## (undated) DEVICE — MANIFOLD KIT ANGIO AUTOMATED 014613

## (undated) DEVICE — TAPE ADH MEDIPORE 6IN SOFT CLOTH 2866

## (undated) DEVICE — SUTURE MONOCRYL+ 4-0 PS-2 27IN MCP426H

## (undated) DEVICE — KIT HAND CONTROL ACIST 014644 AR-P54

## (undated) DEVICE — GLOVE BIOGEL PI SZ 6.5 40865

## (undated) DEVICE — SUCTION STRYKERFLOW II 250-070-500

## (undated) DEVICE — TUBING SUCTION MEDI-VAC 1/4"X20' N620A

## (undated) DEVICE — SU DEVICE COR-KNOT MINI 031300

## (undated) DEVICE — CATH DIAG RADIAL 5FR TIG 4.5 100CM

## (undated) DEVICE — LEAD PACER MYOCARDIAL BIPOLAR TEMPORARY 53CM 6495F

## (undated) DEVICE — PREP CHLORAPREP 26ML TINTED HI-LITE ORANGE 930815

## (undated) DEVICE — SU PROLENE 3-0 SHDA 36" 8522H

## (undated) DEVICE — CONNECTOR CARDIO BLAKE DRAIN BCC2

## (undated) DEVICE — DRSG DRAIN 4X4" 7086

## (undated) DEVICE — CABLE MYO/LEAD PACING WHITE DISP 019-530

## (undated) DEVICE — SUTURE PDS 0 27IN CT1 + VIOLET PDP340H

## (undated) DEVICE — CUSTOM PACK CV ST JOES SCV5BCVHEA

## (undated) DEVICE — ESU ELEC BLADE E-SEP INSULATED NEPTUNE 70MM 0703-070-002

## (undated) DEVICE — HEMOSTASIS BONE OSTENE 2.5G SYNTHETIC 1503832

## (undated) DEVICE — COUNTER NEEDLE ADH & FOAM 20CT 9106

## (undated) DEVICE — ADH SKIN CLOSURE PREMIERPRO EXOFIN 1.0ML 3470

## (undated) DEVICE — PRESSURE MONITORING LINE 72IN PM6172

## (undated) RX ORDER — LIDOCAINE HYDROCHLORIDE 10 MG/ML
INJECTION, SOLUTION EPIDURAL; INFILTRATION; INTRACAUDAL; PERINEURAL
Status: DISPENSED
Start: 2023-04-21

## (undated) RX ORDER — EPHEDRINE SULFATE 50 MG/ML
INJECTION, SOLUTION INTRAMUSCULAR; INTRAVENOUS; SUBCUTANEOUS
Status: DISPENSED
Start: 2023-05-02

## (undated) RX ORDER — ATROPINE SULFATE 0.1 MG/ML
INJECTION INTRAVENOUS
Status: DISPENSED
Start: 2023-05-02

## (undated) RX ORDER — HEPARIN SODIUM 1000 [USP'U]/ML
INJECTION, SOLUTION INTRAVENOUS; SUBCUTANEOUS
Status: DISPENSED
Start: 2023-04-21

## (undated) RX ORDER — ASPIRIN 325 MG
TABLET ORAL
Status: DISPENSED
Start: 2023-04-21

## (undated) RX ORDER — ONDANSETRON 2 MG/ML
INJECTION INTRAMUSCULAR; INTRAVENOUS
Status: DISPENSED
Start: 2023-05-02

## (undated) RX ORDER — HEPARIN SODIUM 1000 [USP'U]/ML
INJECTION, SOLUTION INTRAVENOUS; SUBCUTANEOUS
Status: DISPENSED
Start: 2023-11-10

## (undated) RX ORDER — GLYCOPYRROLATE 0.2 MG/ML
INJECTION, SOLUTION INTRAMUSCULAR; INTRAVENOUS
Status: DISPENSED
Start: 2023-05-02

## (undated) RX ORDER — FENTANYL CITRATE-0.9 % NACL/PF 10 MCG/ML
PLASTIC BAG, INJECTION (ML) INTRAVENOUS
Status: DISPENSED
Start: 2023-05-02

## (undated) RX ORDER — FENTANYL CITRATE 50 UG/ML
INJECTION, SOLUTION INTRAMUSCULAR; INTRAVENOUS
Status: DISPENSED
Start: 2023-05-02

## (undated) RX ORDER — DIAZEPAM 10 MG
TABLET ORAL
Status: DISPENSED
Start: 2023-04-21

## (undated) RX ORDER — DEXAMETHASONE SODIUM PHOSPHATE 10 MG/ML
INJECTION, SOLUTION INTRAMUSCULAR; INTRAVENOUS
Status: DISPENSED
Start: 2023-05-02

## (undated) RX ORDER — FENTANYL CITRATE 50 UG/ML
INJECTION, SOLUTION INTRAMUSCULAR; INTRAVENOUS
Status: DISPENSED
Start: 2023-04-21

## (undated) RX ORDER — PROPOFOL 10 MG/ML
INJECTION, EMULSION INTRAVENOUS
Status: DISPENSED
Start: 2023-05-02

## (undated) RX ORDER — ACETAMINOPHEN 325 MG/1
TABLET ORAL
Status: DISPENSED
Start: 2023-04-21

## (undated) RX ORDER — NICARDIPINE HCL-0.9% SOD CHLOR 1 MG/10 ML
SYRINGE (ML) INTRAVENOUS
Status: DISPENSED
Start: 2023-04-21

## (undated) RX ORDER — NITROGLYCERIN 5 MG/ML
VIAL (ML) INTRAVENOUS
Status: DISPENSED
Start: 2023-04-21

## (undated) RX ORDER — VASOPRESSIN IN 0.9 % NACL 2 UNIT/2ML
SYRINGE (ML) INTRAVENOUS
Status: DISPENSED
Start: 2023-05-02

## (undated) RX ORDER — NEOSTIGMINE METHYLSULFATE 1 MG/ML
VIAL (ML) INJECTION
Status: DISPENSED
Start: 2023-05-02